# Patient Record
Sex: FEMALE | Race: WHITE
[De-identification: names, ages, dates, MRNs, and addresses within clinical notes are randomized per-mention and may not be internally consistent; named-entity substitution may affect disease eponyms.]

---

## 2017-08-08 ENCOUNTER — HOSPITAL ENCOUNTER (OUTPATIENT)
Dept: HOSPITAL 53 - M RAD | Age: 42
End: 2017-08-08
Attending: NURSE PRACTITIONER
Payer: COMMERCIAL

## 2017-08-08 DIAGNOSIS — E11.9: ICD-10-CM

## 2017-08-08 DIAGNOSIS — Z12.31: Primary | ICD-10-CM

## 2017-08-08 DIAGNOSIS — Z13.220: ICD-10-CM

## 2017-08-08 DIAGNOSIS — K21.9: ICD-10-CM

## 2017-08-08 LAB
ALBUMIN SERPL BCG-MCNC: 3.5 GM/DL (ref 3.2–5.2)
ALBUMIN/GLOB SERPL: 0.76 {RATIO} (ref 1–1.93)
ALP SERPL-CCNC: 109 U/L (ref 45–117)
ALT SERPL W P-5'-P-CCNC: 24 U/L (ref 12–78)
ANION GAP SERPL CALC-SCNC: 7 MEQ/L (ref 8–16)
AST SERPL-CCNC: 15 U/L (ref 15–37)
BASOPHILS # BLD AUTO: 0 K/MM3 (ref 0–0.2)
BASOPHILS NFR BLD AUTO: 0.3 % (ref 0–1)
BILIRUB SERPL-MCNC: 0.3 MG/DL (ref 0.2–1)
BUN SERPL-MCNC: 18 MG/DL (ref 7–18)
CALCIUM SERPL-MCNC: 9.2 MG/DL (ref 8.5–10.1)
CHLORIDE SERPL-SCNC: 105 MEQ/L (ref 98–107)
CHOLEST SERPL-MCNC: 175 MG/DL (ref ?–200)
CO2 SERPL-SCNC: 27 MEQ/L (ref 21–32)
CREAT SERPL-MCNC: 0.78 MG/DL (ref 0.55–1.02)
EOSINOPHIL # BLD AUTO: 0 K/MM3 (ref 0–0.5)
EOSINOPHIL NFR BLD AUTO: 0.5 % (ref 0–3)
ERYTHROCYTE [DISTWIDTH] IN BLOOD BY AUTOMATED COUNT: 13.9 % (ref 11.5–14.5)
EST. AVERAGE GLUCOSE BLD GHB EST-MCNC: 108 MG/DL (ref 60–110)
GFR SERPL CREATININE-BSD FRML MDRD: > 60 ML/MIN/{1.73_M2} (ref 58–?)
GLUCOSE SERPL-MCNC: 94 MG/DL (ref 70–105)
LARGE UNSTAINED CELL #: 0.1 K/MM3 (ref 0–0.4)
LARGE UNSTAINED CELL %: 1.1 % (ref 0–4)
LYMPHOCYTES # BLD AUTO: 2.9 K/MM3 (ref 1.5–4.5)
LYMPHOCYTES NFR BLD AUTO: 26.9 % (ref 24–44)
MCH RBC QN AUTO: 28.5 PG (ref 27–33)
MCHC RBC AUTO-ENTMCNC: 33.9 G/DL (ref 32–36.5)
MCV RBC AUTO: 84 FL (ref 80–96)
MONOCYTES # BLD AUTO: 0.4 K/MM3 (ref 0–0.8)
MONOCYTES NFR BLD AUTO: 3.6 % (ref 0–5)
NEUTROPHILS # BLD AUTO: 7.1 K/MM3 (ref 1.8–7.7)
NEUTROPHILS NFR BLD AUTO: 67.6 % (ref 36–66)
PLATELET # BLD AUTO: 226 K/MM3 (ref 150–450)
POTASSIUM SERPL-SCNC: 4.3 MEQ/L (ref 3.5–5.1)
PROT SERPL-MCNC: 8.1 GM/DL (ref 6.4–8.2)
SODIUM SERPL-SCNC: 139 MEQ/L (ref 136–145)
TRIGL SERPL-MCNC: 68 MG/DL (ref ?–150)
WBC # BLD AUTO: 10.5 K/MM3 (ref 4–10)

## 2017-08-08 PROCEDURE — 83036 HEMOGLOBIN GLYCOSYLATED A1C: CPT

## 2017-08-08 PROCEDURE — 36415 COLL VENOUS BLD VENIPUNCTURE: CPT

## 2017-08-08 PROCEDURE — 80061 LIPID PANEL: CPT

## 2017-08-08 PROCEDURE — 85025 COMPLETE CBC W/AUTO DIFF WBC: CPT

## 2017-08-08 PROCEDURE — 80053 COMPREHEN METABOLIC PANEL: CPT

## 2017-08-08 NOTE — REPMRS
Patient History

The patient states she has not had a clinical breast exam in over

a year. Family history of breast cancer in mother at age 50, 

breast cancer in maternal aunt at age 35, and breast cancer in 

maternal grandmother at age 40.

 

Digital Mammo Screening Bilat: August 8, 2017 - Exam #: 

[id number]

Bilateral CC and MLO view(s) were taken.

 

Technologist: Yasmin Taylor, Technologist

Prior study comparison: November 4, 2015, bilateral digital mammo

screening bilat performed at [facility].

 

FINDINGS: There are scattered fibroglandular densities.  There 

has been no change in the appearance of the mammogram from the 

prior studies.  There is a mild amount of residual fibroglandular

tissue which is fairly symmetric. There is no interval 

development of dominant mass, architectural distortion, or 

clustered microcalcification suggestive of malignancy.

 

ASSESSMENT: BI-RADS/ACR category 1 mammogram. Negative.

 

Recommendation

Routine screening mammogram in 1 year (for women over age 40).

This mammogram was interpreted with the aid of an FDA-approved 

computer-aided dectection system.

 

Electronically Signed By: Car Casrto MD 08/08/17 [id number]

## 2019-06-28 ENCOUNTER — HOSPITAL ENCOUNTER (OUTPATIENT)
Dept: HOSPITAL 53 - M WHC | Age: 44
End: 2019-06-28
Attending: NURSE PRACTITIONER
Payer: COMMERCIAL

## 2019-06-28 ENCOUNTER — HOSPITAL ENCOUNTER (OUTPATIENT)
Dept: HOSPITAL 53 - M LABDRAW1 | Age: 44
End: 2019-06-28
Attending: NURSE PRACTITIONER
Payer: COMMERCIAL

## 2019-06-28 DIAGNOSIS — Z12.31: Primary | ICD-10-CM

## 2019-06-28 DIAGNOSIS — E11.9: ICD-10-CM

## 2019-06-28 DIAGNOSIS — Z13.220: Primary | ICD-10-CM

## 2019-06-28 DIAGNOSIS — K21.9: ICD-10-CM

## 2019-06-28 LAB
BASOPHILS # BLD AUTO: 0 10^3/UL (ref 0–0.2)
BASOPHILS NFR BLD AUTO: 0.3 % (ref 0–1)
CHOLEST SERPL-MCNC: 142 MG/DL (ref ?–200)
CHOLEST/HDLC SERPL: 2.84 {RATIO} (ref ?–5)
EOSINOPHIL # BLD AUTO: 0 10^3/UL (ref 0–0.5)
EOSINOPHIL NFR BLD AUTO: 0.3 % (ref 0–3)
EST. AVERAGE GLUCOSE BLD GHB EST-MCNC: 120 MG/DL (ref 60–110)
HCT VFR BLD AUTO: 38.3 % (ref 36–47)
HDLC SERPL-MCNC: 50 MG/DL (ref 40–?)
HGB BLD-MCNC: 12.5 G/DL (ref 12–15.5)
LDLC SERPL CALC-MCNC: 76 MG/DL (ref ?–100)
LYMPHOCYTES # BLD AUTO: 2.6 10^3/UL (ref 1.5–4.5)
LYMPHOCYTES NFR BLD AUTO: 29.2 % (ref 24–44)
MCH RBC QN AUTO: 27.9 PG (ref 27–33)
MCHC RBC AUTO-ENTMCNC: 32.6 G/DL (ref 32–36.5)
MCV RBC AUTO: 85.5 FL (ref 80–96)
MONOCYTES # BLD AUTO: 0.5 10^3/UL (ref 0–0.8)
MONOCYTES NFR BLD AUTO: 5.8 % (ref 0–5)
NEUTROPHILS # BLD AUTO: 5.7 10^3/UL (ref 1.8–7.7)
NEUTROPHILS NFR BLD AUTO: 64 % (ref 36–66)
NONHDLC SERPL-MCNC: 92 MG/DL
PLATELET # BLD AUTO: 214 10^3/UL (ref 150–450)
RBC # BLD AUTO: 4.48 10^6/UL (ref 4–5.4)
TRIGL SERPL-MCNC: 82 MG/DL (ref ?–150)
WBC # BLD AUTO: 9 10^3/UL (ref 4–10)

## 2019-06-28 NOTE — REP
BILATERAL SCREENING DIGITAL MAMMOGRAM WITH 3D TOMOSYNTHESIS:

 

There are no palpable abnormalities or other breast complaints.

The the patient states she has not had a clinical breast examination in over a

year.

The the patient states she performs self-breast examinations 12 times per year

The Tyrer-Cuzick Score is: 32.6% .

 

Comparison is 11/04/2015.

 

There are scattered areas of fibroglandular density.

There is no dominant mass, micro calcific cluster or architectural distortion

that would indicate malignancy. There are benign macrocalcifications, unchanged.

There are no additional findings on 3D tomosynthesiss.

There is no change from the prior study.

 

Impression:

BIRADS/ACR category 2 mammogram.  Benign findings .

 

Recommendation:

Routine annual screening mammography.

 

This mammogram was interpreted with the aid of a FDA approved computer-aided

detection system.

 

A. Negative mammogram reports should not delay biopsy if a dominant or clinically

suspicious mass is present.

B. Not all breast cancers are identified by mammography or tomosynthesis.

C.  Adenosis and dense breasts may obscure an underlying neoplasm.

 

Patient letter M1.

 

 

Electronically Signed by

Car Richards MD 06/28/2019 11:55 A

## 2019-10-16 ENCOUNTER — HOSPITAL ENCOUNTER (OUTPATIENT)
Dept: HOSPITAL 53 - M SFHCWAGY | Age: 44
End: 2019-10-16
Attending: NURSE PRACTITIONER
Payer: COMMERCIAL

## 2019-10-16 DIAGNOSIS — N76.0: Primary | ICD-10-CM

## 2019-10-17 LAB — HPV LOW VOL RFLX: (no result)

## 2019-12-17 ENCOUNTER — HOSPITAL ENCOUNTER (OUTPATIENT)
Dept: HOSPITAL 53 - M LAB | Age: 44
End: 2019-12-17
Attending: NURSE PRACTITIONER
Payer: COMMERCIAL

## 2019-12-17 DIAGNOSIS — Z91.89: Primary | ICD-10-CM

## 2019-12-17 LAB
BUN SERPL-MCNC: 8 MG/DL (ref 7–18)
CREAT SERPL-MCNC: 0.76 MG/DL (ref 0.55–1.3)
GFR SERPL CREATININE-BSD FRML MDRD: > 60 ML/MIN/{1.73_M2} (ref 58–?)

## 2019-12-19 ENCOUNTER — HOSPITAL ENCOUNTER (OUTPATIENT)
Dept: HOSPITAL 53 - M RAD | Age: 44
End: 2019-12-19
Attending: NURSE PRACTITIONER
Payer: COMMERCIAL

## 2019-12-19 DIAGNOSIS — Z91.89: Primary | ICD-10-CM

## 2019-12-19 NOTE — REP
Bilateral breast MRI study without and with IV gadolinium:

 

History: High risk screening for breast cancer.

 

Comparison mammography June 28, 2019.

 

Technique: 3 Wendy MRI imaging was performed with a dedicated breast coil.

Axial, coronal, and sagittal T1 and T2-weighted scans were obtained with and

without fat saturation in the usual fashion.  The study includes dynamically

acquired post gadolinium enhanced imaging subtraction imaging.  Maximal intensity

projection and multiplanar re-formation imaging is included as well.  The study

was interpreted with the aid of CommutableD, an FDA approved computer-aided detection

(CAD) software program, on a dedicated breast MRI work station.

 

The gadolinium enhancement dose is 19 mL of intravenous ProHance.

 

Findings: There are scattered fibroglandular elements bilaterally.  There is no

evidence of axillary lymphadenopathy or significant cystic change.  High

resolution T1 and T2-weighted pre and postcontrast axial and coronal images and

sagittal images show no suspicious morphologic abnormality in either breast.

Dynamically acquired sequential post contrast images show no suspicious area of

enhancement and/or washout in either breast.  Subtraction images are

unremarkable.

 

Impression:

 

BIRADS category 1 negative findings.  Repeat screening MRI study recommended 1

year.

 

 

Electronically Signed by

Arnol Solorzano MD 12/19/2019 07:59 P

## 2020-02-28 ENCOUNTER — HOSPITAL ENCOUNTER (EMERGENCY)
Dept: HOSPITAL 53 - M ED | Age: 45
Discharge: HOME | End: 2020-02-28
Payer: COMMERCIAL

## 2020-02-28 VITALS — SYSTOLIC BLOOD PRESSURE: 123 MMHG | DIASTOLIC BLOOD PRESSURE: 82 MMHG

## 2020-02-28 VITALS — BODY MASS INDEX: 38.52 KG/M2 | WEIGHT: 217.38 LBS | HEIGHT: 63 IN

## 2020-02-28 DIAGNOSIS — Z88.0: ICD-10-CM

## 2020-02-28 DIAGNOSIS — Z87.891: ICD-10-CM
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
## 2020-02-28 DIAGNOSIS — S60.212A: Primary | ICD-10-CM
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
## 2020-02-28 NOTE — REP
LEFT WRIST, FOUR VIEWS:

 

There is no evidence of an acute fracture, dislocation or intrinsic bone

disease.

 

IMPRESSION:

 

No fracture or dislocation.

 

 

 

 

Electronically Signed by

Car Castro MD 02/28/2020 07:18 P

## 2020-03-19 ENCOUNTER — HOSPITAL ENCOUNTER (OUTPATIENT)
Dept: HOSPITAL 53 - M LAB REF | Age: 45
End: 2020-03-19
Attending: FAMILY MEDICINE
Payer: COMMERCIAL

## 2020-03-19 DIAGNOSIS — E11.9: Primary | ICD-10-CM

## 2020-03-19 LAB
25(OH)D3 SERPL-MCNC: 8.3 NG/ML (ref 30–100)
ALBUMIN SERPL BCG-MCNC: 3.5 GM/DL (ref 3.2–5.2)
ALT SERPL W P-5'-P-CCNC: 16 U/L (ref 12–78)
BASOPHILS # BLD AUTO: 0 10^3/UL (ref 0–0.2)
BASOPHILS NFR BLD AUTO: 0.4 % (ref 0–1)
BILIRUB SERPL-MCNC: 0.3 MG/DL (ref 0.2–1)
BUN SERPL-MCNC: 10 MG/DL (ref 7–18)
CALCIUM SERPL-MCNC: 8.9 MG/DL (ref 8.5–10.1)
CHLORIDE SERPL-SCNC: 108 MEQ/L (ref 98–107)
CHOLEST SERPL-MCNC: 149 MG/DL (ref ?–200)
CHOLEST/HDLC SERPL: 2.66 {RATIO} (ref ?–5)
CO2 SERPL-SCNC: 31 MEQ/L (ref 21–32)
CREAT SERPL-MCNC: 0.61 MG/DL (ref 0.55–1.3)
EOSINOPHIL # BLD AUTO: 0.1 10^3/UL (ref 0–0.5)
EOSINOPHIL NFR BLD AUTO: 1.5 % (ref 0–3)
EST. AVERAGE GLUCOSE BLD GHB EST-MCNC: 123 MG/DL (ref 60–110)
GFR SERPL CREATININE-BSD FRML MDRD: > 60 ML/MIN/{1.73_M2} (ref 58–?)
GLUCOSE SERPL-MCNC: 100 MG/DL (ref 70–100)
HCT VFR BLD AUTO: 39.6 % (ref 36–47)
HDLC SERPL-MCNC: 56 MG/DL (ref 40–?)
HGB BLD-MCNC: 12.9 G/DL (ref 12–15.5)
LDLC SERPL CALC-MCNC: 81 MG/DL (ref ?–100)
LYMPHOCYTES # BLD AUTO: 2 10^3/UL (ref 1.5–5)
LYMPHOCYTES NFR BLD AUTO: 26 % (ref 24–44)
MCH RBC QN AUTO: 28 PG (ref 27–33)
MCHC RBC AUTO-ENTMCNC: 32.6 G/DL (ref 32–36.5)
MCV RBC AUTO: 85.9 FL (ref 80–96)
MONOCYTES # BLD AUTO: 0.4 10^3/UL (ref 0–0.8)
MONOCYTES NFR BLD AUTO: 5.7 % (ref 0–5)
NEUTROPHILS # BLD AUTO: 5 10^3/UL (ref 1.5–8.5)
NEUTROPHILS NFR BLD AUTO: 66.1 % (ref 36–66)
NONHDLC SERPL-MCNC: 93 MG/DL
PLATELET # BLD AUTO: 221 10^3/UL (ref 150–450)
POTASSIUM SERPL-SCNC: 4.4 MEQ/L (ref 3.5–5.1)
PROT SERPL-MCNC: 7.5 GM/DL (ref 6.4–8.2)
RBC # BLD AUTO: 4.61 10^6/UL (ref 4–5.4)
SODIUM SERPL-SCNC: 140 MEQ/L (ref 136–145)
TRIGL SERPL-MCNC: 60 MG/DL (ref ?–150)
TSH SERPL DL<=0.005 MIU/L-ACNC: 2.17 UIU/ML (ref 0.36–3.74)
WBC # BLD AUTO: 7.5 10^3/UL (ref 4–10)

## 2020-05-04 ENCOUNTER — HOSPITAL ENCOUNTER (OUTPATIENT)
Dept: HOSPITAL 53 - M LAB REF | Age: 45
End: 2020-05-04
Attending: NURSE PRACTITIONER
Payer: COMMERCIAL

## 2020-05-04 DIAGNOSIS — J06.9: Primary | ICD-10-CM

## 2020-05-04 LAB
ALBUMIN SERPL BCG-MCNC: 3.6 GM/DL (ref 3.2–5.2)
ALT SERPL W P-5'-P-CCNC: 21 U/L (ref 12–78)
BASOPHILS # BLD AUTO: 0 10^3/UL (ref 0–0.2)
BASOPHILS NFR BLD AUTO: 0.3 % (ref 0–1)
BILIRUB SERPL-MCNC: 0.4 MG/DL (ref 0.2–1)
BUN SERPL-MCNC: 8 MG/DL (ref 7–18)
CALCIUM SERPL-MCNC: 9.1 MG/DL (ref 8.5–10.1)
CHLORIDE SERPL-SCNC: 104 MEQ/L (ref 98–107)
CO2 SERPL-SCNC: 32 MEQ/L (ref 21–32)
CREAT SERPL-MCNC: 0.62 MG/DL (ref 0.55–1.3)
EOSINOPHIL # BLD AUTO: 0.2 10^3/UL (ref 0–0.5)
EOSINOPHIL NFR BLD AUTO: 1.9 % (ref 0–3)
GFR SERPL CREATININE-BSD FRML MDRD: > 60 ML/MIN/{1.73_M2} (ref 58–?)
GLUCOSE SERPL-MCNC: 79 MG/DL (ref 70–100)
HCT VFR BLD AUTO: 39.3 % (ref 36–47)
HGB BLD-MCNC: 12.8 G/DL (ref 12–15.5)
LYMPHOCYTES # BLD AUTO: 2.4 10^3/UL (ref 1.5–5)
LYMPHOCYTES NFR BLD AUTO: 26.3 % (ref 24–44)
MCH RBC QN AUTO: 27.9 PG (ref 27–33)
MCHC RBC AUTO-ENTMCNC: 32.6 G/DL (ref 32–36.5)
MCV RBC AUTO: 85.6 FL (ref 80–96)
MONOCYTES # BLD AUTO: 0.5 10^3/UL (ref 0–0.8)
MONOCYTES NFR BLD AUTO: 5.4 % (ref 0–5)
NEUTROPHILS # BLD AUTO: 6.1 10^3/UL (ref 1.5–8.5)
NEUTROPHILS NFR BLD AUTO: 65.8 % (ref 36–66)
PLATELET # BLD AUTO: 236 10^3/UL (ref 150–450)
POTASSIUM SERPL-SCNC: 4.3 MEQ/L (ref 3.5–5.1)
PROT SERPL-MCNC: 8.1 GM/DL (ref 6.4–8.2)
RBC # BLD AUTO: 4.59 10^6/UL (ref 4–5.4)
SODIUM SERPL-SCNC: 141 MEQ/L (ref 136–145)
WBC # BLD AUTO: 9.3 10^3/UL (ref 4–10)

## 2020-08-06 ENCOUNTER — HOSPITAL ENCOUNTER (OUTPATIENT)
Dept: HOSPITAL 53 - M LAB REF | Age: 45
End: 2020-08-06
Attending: NURSE PRACTITIONER
Payer: COMMERCIAL

## 2020-08-06 DIAGNOSIS — R21: ICD-10-CM

## 2020-08-06 DIAGNOSIS — E55.9: ICD-10-CM

## 2020-08-06 DIAGNOSIS — E11.9: ICD-10-CM

## 2020-08-06 DIAGNOSIS — L20.9: Primary | ICD-10-CM

## 2020-08-06 DIAGNOSIS — E66.09: ICD-10-CM

## 2020-08-06 DIAGNOSIS — J30.2: ICD-10-CM

## 2020-09-03 LAB
BASOPHILS # BLD AUTO: 0 10^3/UL (ref 0–0.2)
BASOPHILS NFR BLD AUTO: 0.3 % (ref 0–1)
EOSINOPHIL # BLD AUTO: 0.1 10^3/UL (ref 0–0.5)
EOSINOPHIL NFR BLD AUTO: 1 % (ref 0–3)
HCT VFR BLD AUTO: 36.3 % (ref 36–47)
HGB BLD-MCNC: 11.7 G/DL (ref 12–15.5)
LYMPHOCYTES # BLD AUTO: 1.9 10^3/UL (ref 1.5–5)
LYMPHOCYTES NFR BLD AUTO: 29.9 % (ref 24–44)
MCH RBC QN AUTO: 27.7 PG (ref 27–33)
MCHC RBC AUTO-ENTMCNC: 32.2 G/DL (ref 32–36.5)
MCV RBC AUTO: 85.8 FL (ref 80–96)
MONOCYTES # BLD AUTO: 0.4 10^3/UL (ref 0–0.8)
MONOCYTES NFR BLD AUTO: 6.8 % (ref 0–5)
NEUTROPHILS # BLD AUTO: 3.8 10^3/UL (ref 1.5–8.5)
NEUTROPHILS NFR BLD AUTO: 61.8 % (ref 36–66)
PLATELET # BLD AUTO: 194 10^3/UL (ref 150–450)
RBC # BLD AUTO: 4.23 10^6/UL (ref 4–5.4)
WBC # BLD AUTO: 6.2 10^3/UL (ref 4–10)

## 2020-09-20 LAB
25(OH)D3 SERPL-MCNC: 41.7 NG/ML (ref 30–100)
ALBUMIN SERPL BCG-MCNC: 3.7 GM/DL (ref 3.2–5.2)
ALT SERPL W P-5'-P-CCNC: 24 U/L (ref 12–78)
BILIRUB SERPL-MCNC: 0.3 MG/DL (ref 0.2–1)
BUN SERPL-MCNC: 11 MG/DL (ref 7–18)
CALCIUM SERPL-MCNC: 8.9 MG/DL (ref 8.5–10.1)
CHLORIDE SERPL-SCNC: 107 MEQ/L (ref 98–107)
CHOLEST SERPL-MCNC: 153 MG/DL (ref ?–200)
CHOLEST/HDLC SERPL: 3.25 {RATIO} (ref ?–5)
CO2 SERPL-SCNC: 30 MEQ/L (ref 21–32)
CREAT SERPL-MCNC: 0.68 MG/DL (ref 0.55–1.3)
EST. AVERAGE GLUCOSE BLD GHB EST-MCNC: 111 MG/DL (ref 60–110)
GFR SERPL CREATININE-BSD FRML MDRD: > 60 ML/MIN/{1.73_M2} (ref 58–?)
GLUCOSE SERPL-MCNC: 93 MG/DL (ref 70–100)
HDLC SERPL-MCNC: 47 MG/DL (ref 40–?)
LDLC SERPL CALC-MCNC: 88 MG/DL (ref ?–100)
NONHDLC SERPL-MCNC: 106 MG/DL
POTASSIUM SERPL-SCNC: 4.1 MEQ/L (ref 3.5–5.1)
PROT SERPL-MCNC: 7.9 GM/DL (ref 6.4–8.2)
SODIUM SERPL-SCNC: 140 MEQ/L (ref 136–145)
T4 FREE SERPL-MCNC: 0.98 NG/DL (ref 0.76–1.46)
TRIGL SERPL-MCNC: 92 MG/DL (ref ?–150)
TSH SERPL DL<=0.005 MIU/L-ACNC: 1.87 UIU/ML (ref 0.36–3.74)

## 2020-10-11 ENCOUNTER — HOSPITAL ENCOUNTER (EMERGENCY)
Dept: HOSPITAL 53 - M ED | Age: 45
Discharge: HOME | End: 2020-10-11
Payer: COMMERCIAL

## 2020-10-11 VITALS — BODY MASS INDEX: 39.06 KG/M2 | HEIGHT: 63 IN | WEIGHT: 220.46 LBS

## 2020-10-11 VITALS — DIASTOLIC BLOOD PRESSURE: 59 MMHG | SYSTOLIC BLOOD PRESSURE: 125 MMHG

## 2020-10-11 DIAGNOSIS — R06.02: ICD-10-CM

## 2020-10-11 DIAGNOSIS — Z87.891: ICD-10-CM

## 2020-10-11 DIAGNOSIS — Z88.0: ICD-10-CM

## 2020-10-11 DIAGNOSIS — R07.9: Primary | ICD-10-CM

## 2020-10-11 DIAGNOSIS — Z91.040: ICD-10-CM

## 2020-10-11 DIAGNOSIS — R00.0: ICD-10-CM

## 2020-10-11 DIAGNOSIS — E11.9: ICD-10-CM

## 2020-10-11 DIAGNOSIS — Z82.49: ICD-10-CM

## 2020-10-11 LAB
BASOPHILS # BLD AUTO: 0 10^3/UL (ref 0–0.2)
BASOPHILS NFR BLD AUTO: 0.3 % (ref 0–1)
EOSINOPHIL # BLD AUTO: 0.1 10^3/UL (ref 0–0.5)
EOSINOPHIL NFR BLD AUTO: 0.9 % (ref 0–3)
HCT VFR BLD AUTO: 36 % (ref 36–47)
HGB BLD-MCNC: 11.4 G/DL (ref 12–15.5)
LYMPHOCYTES # BLD AUTO: 2.7 10^3/UL (ref 1.5–5)
LYMPHOCYTES NFR BLD AUTO: 25.9 % (ref 24–44)
MCH RBC QN AUTO: 26 PG (ref 27–33)
MCHC RBC AUTO-ENTMCNC: 31.7 G/DL (ref 32–36.5)
MCV RBC AUTO: 82 FL (ref 80–96)
MONOCYTES # BLD AUTO: 0.6 10^3/UL (ref 0–0.8)
MONOCYTES NFR BLD AUTO: 5.8 % (ref 0–5)
NEUTROPHILS # BLD AUTO: 6.9 10^3/UL (ref 1.5–8.5)
NEUTROPHILS NFR BLD AUTO: 66.7 % (ref 36–66)
PLATELET # BLD AUTO: 241 10^3/UL (ref 150–450)
RBC # BLD AUTO: 4.39 10^6/UL (ref 4–5.4)
WBC # BLD AUTO: 10.4 10^3/UL (ref 4–10)

## 2020-10-11 NOTE — REPVR
PROCEDURE INFORMATION: 

Exam: XR Chest, 1 View 

Exam date and time: 10/11/2020 7:46 PM 

Age: 45 years old 

Clinical indication: Chest pain; Type not specified 



TECHNIQUE: 

Imaging protocol: XR of the chest 

Views: 1 view. 



COMPARISON: 

CR Chest, 2 view PA, Lat 3/23/2015 10:47 AM 



FINDINGS: 

Lungs: There is decreased inflation of the lungs. No focal infiltrates.

Pleural space: Unremarkable. No pleural effusion. No pneumothorax. 

Heart/Mediastinum: Unremarkable. No cardiomegaly. 

Bones/joints: Unremarkable. 

Soft tissues: There are moderately generous overlying soft tissues. 



IMPRESSION: 

Negative poor inspiratory chest with little change from 03/23/2015. 



Electronically signed by: Atul Alonzo On 10/11/2020  20:23:02 PM

## 2020-10-12 NOTE — ECGEPIP
[facility] - ED

                                       

                                       Test Date:    2020-10-11

Pat Name:     LAYTON LOWERY           Department:   

Patient ID:   [id number]                 Room:         -

Gender:       Female                   Technician:   NITZA

:          1975               Requested By: TOSHIA RIZZO

Order Number: [id number]     Reading MD:   Ria Seaman

                                 Measurements

Intervals                              Axis          

Rate:         103                      P:            52

MT:           192                      QRS:          -22

QRSD:         82                       T:            17

QT:           339                                    

QTc:          445                                    

                           Interpretive Statements

SINUS TACHYCARDIA

VOLTAGE CRITERIA FOR LVH

POSSIBLE ANTERIOR MYOCARDIAL INFARCTION, OF INDETERMINATE AGE

INCREASED RATE 3/23/15

Electronically Signed on 10- 7:03:47 EDT by Ria Seaman

## 2020-10-30 ENCOUNTER — HOSPITAL ENCOUNTER (OUTPATIENT)
Dept: HOSPITAL 53 - M SLEEP HO | Age: 45
End: 2020-10-30
Attending: NURSE PRACTITIONER
Payer: COMMERCIAL

## 2020-10-30 DIAGNOSIS — R06.83: Primary | ICD-10-CM

## 2020-11-10 ENCOUNTER — HOSPITAL ENCOUNTER (OUTPATIENT)
Dept: HOSPITAL 53 - M LAB REF | Age: 45
End: 2020-11-10
Attending: NURSE PRACTITIONER
Payer: COMMERCIAL

## 2020-11-10 DIAGNOSIS — E66.09: ICD-10-CM

## 2020-11-10 DIAGNOSIS — E55.9: ICD-10-CM

## 2020-11-10 DIAGNOSIS — E11.9: ICD-10-CM

## 2020-11-10 DIAGNOSIS — F41.8: Primary | ICD-10-CM

## 2020-11-10 LAB
25(OH)D3 SERPL-MCNC: 24.7 NG/ML (ref 30–100)
ALBUMIN SERPL BCG-MCNC: 3.6 GM/DL (ref 3.2–5.2)
ALT SERPL W P-5'-P-CCNC: 16 U/L (ref 12–78)
BASOPHILS # BLD AUTO: 0 10^3/UL (ref 0–0.2)
BASOPHILS NFR BLD AUTO: 0.3 % (ref 0–1)
BILIRUB SERPL-MCNC: 0.3 MG/DL (ref 0.2–1)
BUN SERPL-MCNC: 12 MG/DL (ref 7–18)
CALCIUM SERPL-MCNC: 9 MG/DL (ref 8.5–10.1)
CHLORIDE SERPL-SCNC: 107 MEQ/L (ref 98–107)
CHOLEST SERPL-MCNC: 144 MG/DL (ref ?–200)
CHOLEST/HDLC SERPL: 2.77 {RATIO} (ref ?–5)
CO2 SERPL-SCNC: 28 MEQ/L (ref 21–32)
CREAT SERPL-MCNC: 0.73 MG/DL (ref 0.55–1.3)
EOSINOPHIL # BLD AUTO: 0.1 10^3/UL (ref 0–0.5)
EOSINOPHIL NFR BLD AUTO: 0.7 % (ref 0–3)
EST. AVERAGE GLUCOSE BLD GHB EST-MCNC: 114 MG/DL (ref 60–110)
GFR SERPL CREATININE-BSD FRML MDRD: > 60 ML/MIN/{1.73_M2} (ref 58–?)
GLUCOSE SERPL-MCNC: 102 MG/DL (ref 70–100)
HCT VFR BLD AUTO: 37.9 % (ref 36–47)
HDLC SERPL-MCNC: 52 MG/DL (ref 40–?)
HGB BLD-MCNC: 11.6 G/DL (ref 12–15.5)
LDLC SERPL CALC-MCNC: 80 MG/DL (ref ?–100)
LYMPHOCYTES # BLD AUTO: 2.1 10^3/UL (ref 1.5–5)
LYMPHOCYTES NFR BLD AUTO: 28.6 % (ref 24–44)
MCH RBC QN AUTO: 25.4 PG (ref 27–33)
MCHC RBC AUTO-ENTMCNC: 30.6 G/DL (ref 32–36.5)
MCV RBC AUTO: 82.9 FL (ref 80–96)
MONOCYTES # BLD AUTO: 0.4 10^3/UL (ref 0–0.8)
MONOCYTES NFR BLD AUTO: 6 % (ref 0–5)
NEUTROPHILS # BLD AUTO: 4.7 10^3/UL (ref 1.5–8.5)
NEUTROPHILS NFR BLD AUTO: 64.1 % (ref 36–66)
NONHDLC SERPL-MCNC: 92 MG/DL
PLATELET # BLD AUTO: 223 10^3/UL (ref 150–450)
POTASSIUM SERPL-SCNC: 4.5 MEQ/L (ref 3.5–5.1)
PROT SERPL-MCNC: 7.8 GM/DL (ref 6.4–8.2)
RBC # BLD AUTO: 4.57 10^6/UL (ref 4–5.4)
SODIUM SERPL-SCNC: 141 MEQ/L (ref 136–145)
TRIGL SERPL-MCNC: 59 MG/DL (ref ?–150)
WBC # BLD AUTO: 7.3 10^3/UL (ref 4–10)

## 2021-02-01 ENCOUNTER — HOSPITAL ENCOUNTER (EMERGENCY)
Dept: HOSPITAL 53 - M ED | Age: 46
Discharge: HOME | End: 2021-02-01
Payer: COMMERCIAL

## 2021-02-01 VITALS
DIASTOLIC BLOOD PRESSURE: 87 MMHG | WEIGHT: 228.4 LBS | HEIGHT: 63 IN | BODY MASS INDEX: 40.47 KG/M2 | SYSTOLIC BLOOD PRESSURE: 133 MMHG

## 2021-02-01 DIAGNOSIS — I10: ICD-10-CM
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
## 2021-02-01 DIAGNOSIS — S43.51XA: Primary | ICD-10-CM

## 2021-02-01 DIAGNOSIS — E11.9: ICD-10-CM

## 2021-02-01 DIAGNOSIS — F32.9: ICD-10-CM

## 2021-02-01 DIAGNOSIS — Z88.0: ICD-10-CM

## 2021-02-01 DIAGNOSIS — Z79.899: ICD-10-CM

## 2021-02-01 DIAGNOSIS — Z91.040: ICD-10-CM
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
## 2021-02-01 DIAGNOSIS — F41.9: ICD-10-CM
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
## 2021-02-01 NOTE — CCD
Continuity of Care Document (CCD)

                             Created on: 2020



Shalini Danna NHUNG

External Reference #: [id number]

: 1975

Sex: Female



Demographics





                          Address                   [address] [place]  [address]

 

                          Home Phone                [phone]

 

                          Preferred Language        Unknown

 

                          Marital Status            Unknown

 

                          [employer]ar[employer] Affiliation     Unknown

 

                          Race                      White

 

                          Ethnic Group              Declined to Specify/Unknown





Author





                          Author                    Danna VILLAR

 

                          Organization              Unknown

 

                          Address                   [address] [place] [address], [facility]

[place]  [address]



 

                          Phone                     [phone]







Care Team Providers





                    Care Team Member Name Role                Phone

 

                    Maryana Jarrett [facility] AUTM                [phone]







Problems





                    Active Problems     Provider            Date

 

                    Chest pain          Earnest Reyes MD Onset: 10/28/2020

 

                    Obesity             Earnest Reyes MD Onset: 10/28/2020

 

                    Type 2 diabetes mellitus Earnest Reyes MD Onset: 10/28/2

020

 

                    Dietary management surveillance Earnest Reyes MD Onset: 

10/28/2020

 

                    Electrocardiogram abnormal Earnest Reyes MD Onset: 10/28

/2020

 

                          Elevated blood-pressure reading without diagnosis of h

ypertension Earnest Reyes MD                             Onset: 10/28/2020

 

                    Snoring             Earnest Reyes MD Onset: 10/28/2020







Social History





                Type            Date            Description     Comments

 

                Birth Sex                       Unknown          

 

                ETOH Use                        Occasionally consumes alcohol  

 

                Tobacco Use     Start: Unknown End: Unknown Patient is a former 

smoker started at 

age 17, at most 1/2 ppd, quit at age 33 

 

                Smoking Status  Reviewed: 10/28/20 Patient is a former smoker st

arted at age 17, 

at most 1/2 ppd, quit at age 33 

 

                    Exercise Type/Frequency                     Fully active: Ab

le to carry on all performance without

restriction                             on feet all day at work 

 

                Exercise Limitations                 None             







Allergies, Adverse Reactions, Alerts





             Active Allergies Reaction     Severity     Comments     Date

 

             Penicillins                            anaphylactic shock 

9

 

             Latex                                               2015







Medications





           Active Medications SIG        Qnty       Indications Ordering Provide

r Date

 

             Loratadine                     10mg Tablets                   1 susy

ly                                

Maryana Jarrett FNP                 10/27/2020

 

                          Vitamin D                     25mcg (1000 Ut) Tablets 

                  1 by 

mouth every day                                 Unknown         10/27/2020







Immunizations





                                        Description

 

                                        No Information Available







Vital Signs





                Date            Vital           Result          Comment

 

                10/28/2020  9:06am Weight          224.00 lb        

 

                    Height              63 inches           5'3"

 

                    BMI (Body Mass Index) 39.7 kg/m2           

 

                    Heart Rate          84 /min              

 

                    BP Systolic Sitting 123 mmHg            Omron, large cuff/Ra

 

                    BP Diastolic Sitting 98 mmHg             Omron, large cuff/R

a







Results





        Test    Acquired Date Facility Test    Result  H/L     Range   Note

 

                    CBC without Differential 10/11/2020          [facility]

e

           (315)-   - White Blood Count 10.4                  4.3-10.9    

 

             Red Blood Count 4.39         Low          4.70-6.20     

 

             Platelets    241                       130-400       

 

             Hemoglobin   11.4         Low          13.0-17.0     

 

             Hematocrit   36.0         Low          39.0-50.0     

 

                    BMP                 10/11/2020          [facility]

           (315)-   - Calcium Ser/Plasma Mass/Vol 5.0                           

    

 

             Sodium       140                                     

 

             Carbon Dioxide Ser/Plasm 27.0                                    

 

             Chloride Serum/Plasma 99                                      

 

             Potassium    4.0                                     

 

             Glucose      185          High                 

 

             Blood Urea Nitrogen 9                         5-21          

 

             Creatinine   0.7                       0.6-1.5       

 

             G F R        --                                      







Procedures





                Date            Code            Description     Status

 

                2020      76950           Echocardiogram 2-D Doppler Color

 Completed

 

                10/28/2020      14069           ECG 12-Lead     Completed







Medical Devices





                                        Description

 

                                        No Information Available







Encounters





           Type       Date       Location   Provider   Dx         Diagnosis

 

           Office Visit 10/28/2020  8:45a Main Office Earnest Reyes MD R07.9

      Chest 

pain, unspecified

 

                          R94.31                    Abnormal electrocardiogram [

ECG] [EKG]

 

                          R03.0                     Elevated blood-pressure read

ing, w/o diagnosis of htn

 

                          E66.09                    Other obesity due to excess 

calories

 

                          R06.83                    Snoring

 

                          Z71.3                     Dietary counseling and surve

illance







Assessments





                Date            Code            Description     Provider

 

                2020      R94.31          Abnormal electrocardiogram [ECG]

 [EKG] ECHO

 

                10/28/2020      R07.9           Chest pain, unspecified Earnest Reyes MD

 

                10/28/2020      R94.31          Abnormal electrocardiogram [ECG]

 [EKG] Earnest Reyes MD

 

                    10/28/2020          R03.0               Elevated blood-press

ure reading, without diagnosis of 

hypertension                            Earnest Reyes MD

 

                10/28/2020      E66.09          Other obesity due to excess terrell

rio Earnest Reyes MD

 

                10/28/2020      R06.83          Snoring         Earnest Reyes MD

 

                10/28/2020      Z71.3           Dietary counseling and surveilla

[facility] Earnest Reyes MD







Plan of Treatment

Future Appointment(s):* 2020  2:00 pm - Holter/Event/Telemetry at Main 
  Office

* 2020  8:00 am - Stress Nuclear/Reg Treadmill at Main Office

10/28/2020 - Earnest Reyes MD* R07.9 Chest pain, unspecified* New Orders:* 
  Treadmill, Scheduled: 20



* Recommendations:* Further evaluation with a Roldan protocol exercise stress 
  test.





* R94.31 Abnormal electrocardiogram [ECG] [EKG]* Recommendations:* 
  Echocardiogram-Doppler





* R03.0 Elevated blood-pressure reading, without diagnosis of hypertension* New 
  Orders:* Ambulatory Blood Pressure Monitoring, Scheduled: 20



* Recommendations:* Ambulatory Blood Pressure Monitor was ordered.





* E66.09 Other obesity due to excess calories* Recommendations:* LOW FAT, WHOLE-
  FOOD, PLANT-BASED DIET - Include fruits, vegetables, intact whole grains, 
  beans & legumes; especially green leafy vegetables, cruciferous vegetables 
  (e.g., broccoli, kale, cauliflower, brussel sprouts, cabbage), turmeric, 
  ground flaxseeds - Avoid animal products (meat, poultry, fish, dairy products,
   eggs) - Avoid processed foods (added sugar, high fructose corn syrup, oil, 
  salt) - Low Fat (8-12% of total calories) - No liquid oils, margarine, 
  shortening, butter, lard, mayonnaise.  - No trans-fats (partially hydrogenated
   oils). - No alcohol - No artificial sweeteners - B12 supplement if completely
   vegan and not on adequate amounts of B12 fortified foods - Non-GMO preferred 
  - Avoid artificial food colors - No fish oil LOW SODIUM   - Target total daily
   sodium 7026-5903 mg /d [do not go below 2300 mg/d]   - No added salt.    - 
  Rule of thumb : Strive for sodium/calorie ratio less than or equal to 1.0 
  EATING HABITS - Stop eating when you reach satiety. - "We wear the fat we 
  eat." - Avoid liquid calories (eat your calories, don't drink your calories). 
  - Avoid restaurants EXERCISE ADVICE (General exercise advice; depends on 
  health conditions) Cardio: Walking or equivalent aerobic activity for 40-60 
  minutes at least 5 times a week and preferably everyday.  Resistance training 
  20-30 minutes, at least 2-3 times per week





* R06.83 Snoring* New Orders:* Home Sleep Study, Scheduled: 10/30/20



* Recommendations:* Home sleep study was ordered.





* Z71.3 Dietary counseling and surveillance

* All * Follow up:* Further follow-up, if any, depending upon results of cardiac
  testing.









Functional Status





                Functional Condition Comment         Date            Status

 

                Independent with all ADL's                                 Activ

e







Mental Status





                                        Description

 

                                        No Information Available







Referrals





                Refer to      Reason for Referral Status          Appt Date

 

                Earnest Reyes MD echo auth emr-expires 21. ca  Created  

       

 

                                        [address] [facility], [facility]

 

                                        [place] [id number] [phone]

## 2021-02-01 NOTE — CCD
Continuity of Care Document (CCD)

                             Created on: 2020



Danna Loya

External Reference #: [id number]

: 1975

Sex: Female



Demographics





                          Address                   [address] [place] [address]

 

                          Home Phone                [phone]

 

                          Preferred Language        Unknown

 

                          Marital Status            Unknown

 

                          Orthodox Affiliation     Unknown

 

                          Race                      White

 

                          Ethnic Group              Declined to Specify/Unknown





Author





                          Author                    Danna TAMEZ MD

 

                          Organization              Unknown

 

                          Address                   [address] [place] [address] [address]

[place]  [address]



 

                          Phone                     [phone]







Care Team Providers





                    Care Team Member Name Role                Phone

 

                    Maryana Jarrett MARIA AUTM                [phone]

 

                    Ali, Mohsin MD      AUTM                [phone]







Problems





                    Active Problems     Provider            Date

 

                    Chest pain          Earnest Tamez MD Onset: 10/28/2020

 

                    Obesity             Earnest Tamez MD Onset: 10/28/2020

 

                    Type 2 diabetes mellitus Earnest Tamez MD Onset: 10/28/2

020

 

                    Dietary management surveillance Earnest Tamez MD Onset: 

10/28/2020

 

                    Electrocardiogram abnormal Earnest Tamez MD Onset: 10/28

/2020

 

                          Elevated blood-pressure reading without diagnosis of h

ypertension Earnest Tamez MD                             Onset: 10/28/2020

 

                    Snoring             Earnest Tamez MD Onset: 10/28/2020

 

                    Abnormal results of cardiovascular function studies Earnest Tamez MD Onset: 

2020

 

                    Palpitations        Earnest Tamez MD Onset: 2020

 

                    Morbid obesity      Earnest Tamez MD Onset: 2020

 

                    Essential hypertension Earnest Tamez MD Onset: 

0







Social History





                Type            Date            Description     Comments

 

                Birth Sex                       Unknown          

 

                ETOH Use                        Occasionally consumes alcohol  

 

                Tobacco Use     Start: Unknown End: Unknown Patient is a former 

smoker started at 

age 17, at most 1/2 ppd, quit at age 33 

 

                Smoking Status  Reviewed: 20 Patient is a former smoker st

arted at age 17, 

at most 1/2 ppd, quit at age 33 

 

                    Exercise Type/Frequency                     Fully active: Ab

le to carry on all performance without

restriction                             on feet all day at work 

 

                Exercise Limitations                 None             







Allergies, Adverse Reactions, Alerts





             Active Allergies Reaction     Severity     Comments     Date

 

             Penicillins                            anaphylactic shock 

9

 

             Latex                                               2015







Medications





           Active Medications SIG        Qnty       Indications Ordering Provide

r Date

 

                          Losartan Potassium                     25mg Tablets   

                1/2 tab by

mouth every morning 30tabs          I10             Earnest Tamez MD 

020

 

                          Doxycycline Hyclate                     100mg Capsules

                   1 by 

mouth twice a day                                 Unknown         2020

 

             Loratadine                     10mg Tablets                   1 susy

Maryana Wyatt FNP                 10/27/2020

 

                          Vitamin D                     25mcg (1000 Ut) Tablets 

                  1 by 

mouth every day                                 Unknown         10/27/2020







Immunizations





                                        Description

 

                                        No Information Available







Vital Signs





                Date            Vital           Result          Comment

 

                2020  8:36am Weight          227.00 lb        

 

                    Height              63 inches           5'3"

 

                    BMI (Body Mass Index) 40.2 kg/m2           

 

                    Heart Rate          83 /min              

 

                    BP Systolic Sitting 128 mmHg            Omron, large cuff/Ra

 

                    BP Diastolic Sitting 90 mmHg             Omron, large cuff/R

a

 

                10/28/2020  9:06am Weight          224.00 lb        

 

                    Height              63 inches           5'3"

 

                    BMI (Body Mass Index) 39.7 kg/m2           

 

                    Heart Rate          84 /min              

 

                    BP Systolic Sitting 123 mmHg            Omron, large cuff/Ra

 

                    BP Diastolic Sitting 98 mmHg             Omron, large cuff/R

a







Results





        Test    Acquired Date Facility Test    Result  H/L     Range   Note

 

                    CBC without Differential 10/11/2020          [facility]

e

           (315)-   - White Blood Count 10.4                  4.3-10.9    

 

             Red Blood Count 4.39         Low          4.70-6.20     

 

             Platelets    241                       130-400       

 

             Hemoglobin   11.4         Low          13.0-17.0     

 

             Hematocrit   36.0         Low          39.0-50.0     

 

                    BMP                 10/11/2020          [facility]

           (315)-   - Calcium Ser/Plasma Mass/Vol 5.0                           

    

 

             Sodium       140                                     

 

             Carbon Dioxide Ser/Plasm 27.0                                    

 

             Chloride Serum/Plasma 99                                      

 

             Potassium    4.0                                     

 

             Glucose      185          High                 

 

             Blood Urea Nitrogen 9                         5-21          

 

             Creatinine   0.7                       0.6-1.5       

 

             G F R        --                                      







Procedures





                Date            Code            Description     Status

 

                2020      81182           Treadmill/Pharmacological Monito

ring Completed

 

                    2020          54480               Ambulatory Blood Pre

ssure 

Monitoring;Recording,Scanning,Inter,RPT Completed

 

                2020      29033           Echocardiogram 2-D Doppler Color

 Completed

 

                10/28/2020      86105           ECG 12-Lead     Completed







[facility]





                                        Description

 

                                        No Information Available







Encounters





           Type       Date       Location   Provider   Dx         Diagnosis

 

           Office Visit 2020  9:00a Main Office Earnest Tamez MD I10  

      Essential 

(primary) hypertension

 

                          R07.9                     Chest pain, unspecified

 

                          E66.01                    Morbid (severe) obesity due 

to excess calories

 

                          R00.2                     Palpitations

 

                          R94.39                    Abnormal result of other car

diovascular function study

 

                          R06.83                    Snoring

 

           Office Visit 10/28/2020  8:45a Main Office Earnest Tamez MD R07.9

      Chest 

pain, unspecified

 

                          R94.31                    Abnormal electrocardiogram [

ECG] [EKG]

 

                          R03.0                     Elevated blood-pressure read

ing, w/o diagnosis of htn

 

                          E66.09                    Other obesity due to excess 

calories

 

                          R06.83                    Snoring

 

                          Z71.3                     Dietary counseling and surve

illance







Assessments





                Date            Code            Description     Provider

 

                2020      I10             Essential (primary) hypertension

 Earnest Tamez MD

 

                2020      R07.9           Chest pain, unspecified Earnest Tamez MD

 

                2020      E66.01          Morbid (severe) obesity due to e

xcess calories Earnest Tamez MD

 

                2020      R00.2           Palpitations    Earnest Tamez MD

 

                2020      R94.39          Abnormal result of other cardiov

ascular function study Earnest Tamez MD

 

                2020      R06.83          Snoring         Earnest Tamez MD

 

                2020      I10             Essential (primary) hypertension

 Earnest Tamez MD

 

                2020      R07.9           Chest pain, unspecified Stress N

uclear/Reg Treadmill

 

                    2020          R03.0               Elevated blood-press

ure reading, without diagnosis of 

hypertension                            Holter/Event/Telemetry

 

                2020      R94.31          Abnormal electrocardiogram [ECG]

 [EKG] ECHO

 

                10/28/2020      R07.9           Chest pain, unspecified Earnest Tamez MD

 

                10/28/2020      R94.31          Abnormal electrocardiogram [ECG]

 [EKG] Earnest Tamez MD

 

                    10/28/2020          R03.0               Elevated blood-press

ure reading, without diagnosis of 

hypertension                            Earnest Tamez MD

 

                10/28/2020      E66.09          Other obesity due to excess terrell

rio Earnest Tamez MD

 

                10/28/2020      R06.83          Snoring         Earnest Tamez MD

 

                10/28/2020      Z71.3           Dietary counseling and surveilla

nce Earnest Tamez MD







Plan of Treatment

Future Appointment(s):* 2021  8:00 am - Earnest Tamez MD at Main 
  Office

* 2021  3:30 pm - Holter/Event/Telemetry at Main Office

* 2021  8:30 am - Stress Nuclear/Reg Treadmill at Main Office

2020 - Earnest Tamez MD* I10 Essential (primary) hypertension* New 
  Medication:* Losartan Potassium 25 mg - 1/2 tab by mouth every morning





* R07.9 Chest pain, unspecified* New Xrays:* NM Heart Myocardial Perfusion Spect
   Multiple Studies, Scheduled: 21





* E66.01 Morbid (severe) obesity due to excess calories

* R00.2 Palpitations* New Orders:* Holter Monitor, Scheduled: 21





* R94.39 Abnormal result of other cardiovascular function study

* R06.83 Snoring* Referral:* Ali, Mohsin, MD, Internal Medicine



* Recommendations:* Patient was referred to sleep specialist Dr. Mohsin Ali for 
  further evaluation and management.





* All * Follow up:* Clinic visit in 6 weeks.









Functional Status





                Functional Condition Comment         Date            Status

 

                Independent with all ADL's                                 Activ

e







Mental Status





                                        Description

 

                                        No Information Available







Referrals





                Refer to      Reason for Referral Status          Appt Date

 

                          Ali, Mohsin, MD           Please evaluate & manage sno

ring, somnolence, abnormal home 

sleep study.  Thanks!!    Created                   

 

                                        [facility]

 

                                        [address] [facility] [address]

 

                                        [phone]

 

                                          

 

                Earnest Tamez MD echo auth emr-expires 21. ca  Created  

       

 

                                        [address] [facility], [address]

 

                                        [facility] [address]

 

                                        [phone]

## 2021-02-01 NOTE — CCD
Ambulatory Summary

                             Created on: 2020



Danna Loya

External Reference #: [id number]

: 1975

Sex: Female



Demographics





                          Address                   [address] [facility] [address] [place] [address]  [address]

 

                          Home Phone                [phone]

 

                          Preferred Language        English

 

                          Marital Status            Unknown

 

                          Christian Affiliation     Unknown

 

                          Race                      White

 

                          Ethnic Group              Not  or 





Author





                          Organization              Unknown

 

                          Address                   [address] [place] [address]  [address]



 

                          Phone                     [phone]







Care Team Providers





                    Care Team Member Name Role                Phone

 

                    Maryana Jarrett  Unavailable         Unavailable



                                                      



Allergies

                      



                Code                   Code System                   Name       

            

Reaction                   Severity                   Status                   

Onset                

 

                                                                                

Penicillin          

                                                                               

              

                                                                               

    Active             

                                                            2020          

                        



            



                                        Notes: Some allergies listed in Document

: #[id number] could not be added to this 

patient's chart. Please review this document and add these allergies to the 
patient's chart manually as needed.                



                                                                                
       



Medications

                      



                Name                   Status                   Start Date      

             

Stop Date                                                            

 

                                        azithromycin 250 mg tablet

 TAKE 2TABS BY MOUTH ON DAY 1 THEN 1 TABLET DAILY FOR 4 DAYS Active             

                    Not 

available

 

                                        betamethasone dipropionate 0.05 % topica

l ointment

 APPLY TO AFFECTED AREA S  ON ARMS AND LEGS TWICE DAILY AS NEEDED Active        

                         Not 

available

 

                                        cholecalciferol (vitamin D3) 1,250 mcg (

50,000 unit) capsule

 TAKE 1 CAP BY MOUTH ONCE WEEKLY FOR 12 WEEKS THEN CHANGE TO 1000 UNIT TAB  
DAILY THEREAFTER    Active                                 Not available

 

                                        cholecalciferol (vitamin D3) 25 mcg (1,0

00 unit) tablet

 TAKE ONE TABLET BY MOUTH EVERY DAY  STARTING AFTER 50 000 UNIT CAPSULES ARE 
COMPLETED           Active                                 Not available

 

                fluticasone propionate 50 mcg/actuation nasal spray,suspension A

ctive                         Not 

available

 

                loratadine 10 mg tablet Active                         Not avai

lable

 

                                        prednisone 10 mg tablet

 TAKE 2 TABLETS BY MOUTH TWICE DAILY FOR 5 DAYS THEN 1 TABLET TWICE DAILY FOR 5 
DAYS THEN 1 TABLET DAILY Active                                 Not available

 

                triamcinolone acetonide 0.1 % topical ointment Active          

               Not available

 

                vitamin d3  25 mcg (1000 ut) tabs Active                       

  Not available



                                                                                
                                                                                
      



Problems

                      



                Name                   Status                   Onset Date      

             

Source                                                  

 

                Simple Obesity  Active          2020      History

 

                Body Mass Index 30+ - Obesity Active          2020      Hi

story

 

                Clinical Finding Active          2020      History

 

                Clinical Finding Active          2020      History

 

                Seasonal Allergic Rhinitis Active          2020      Histo

ry

 

                Disorder of Upper Respiratory System Active          2020 

     History

 

                Eruption        Active          2020      History

 

                Vitamin D Deficiency Active          2020      History

 

                Atopic Dermatitis Active          2020      History

 

                Procedure by Method Active          2020      History

 

                Patient Asked to Attend Active          2020      History

 

                Emotional State Finding Active          2020      History



                                                                                
                                                                                
                                    



Procedures

          







                                        Notes: Rt ankle reconstruction , Cholecy

sectomy 2009, Tubal ligation 

2014



                                                                              



Results

                          



                                        Lab Results

 

                                         



                



      Date  Name  Specimen Result Interpretation Description Value Range Status 

Address 



 

        11/10/2020 CBC W/ Auto Diff         Normal           White Blood Count  

7.3 10  4.0-10.0 10 

[facility]: [address] [place] [address]

 

                          Normal         Red Blood Count  4.57 10 4.00-5.40 10

 [facility]: [address] [place] [address]

 

                          Low            Hemoglobin  11.6 g/dL 12.0-15.5 g/dL 

[facility]: [address] [place] [address]

 

                          Normal         Hematocrit  37.9 % 36.0-47.0 % [facility]: 

[address] [place] [address]

 

                          Normal         Mean Corpuscular Volume  82.9 fL 80.0

-96.0 fL [facility]: [address] [place] [address]

 

                          Low            Mean Corpuscular Hemoglobin  25.4 pg 

27.0-33.0 pg [facility]: [address] [place] [address]

 

                          Low            Mean Corpuscular HGB Conc  30.6 g/dL 

32.0-36.5 g/dL [facility]: [address] [place] [address]

 

                          Normal         Red Cell Distribution Width  14.4 % 1

1.5-14.5 % [facility]: [address] [place] [address]

 

                          Normal         Platelet Count, Automated  223 10 150

-450 10 [facility]: 0 [facility]

 

                          Normal         Neutrophils %  64.1 % 36.0-66.0 % [facility]: 0 [facility]

 

                          Normal         Lymph %  28.6 % 24.0-44.0 % [facility]: 0 

[facility]

 

                          High           Mono %  6.0 %  0.0-5.0 % [facility]: 0 

[facility]

 

                          Normal         Eos %  0.7 %  0.0-3.0 % [facility]: [address] [place] [address]

 

                          Normal         Baso %  0.3 %  0.0-1.0 % [facility]: [address] [place] [address]

 

                          Normal         Immature Granulocyte %  0.3 %  0-3.0 

% [facility]: [address] [place] [address]

 

                          Normal         Nucleated Red Blood Cell %  0.0 %  0-

0 %  [facility]: [address] [place] [address]

 

                          Normal         Neutrophils #  4.7 10 1.5-8.5 10 [facility]:

[address] [place] [address]

 

                          Normal         Lymph #  2.1 10 1.5-5.0 10 [facility]: [address] [place] [address]

 

                          Normal         Mono #  0.4 10 0.0-0.8 10 [facility]: [address] [place] [address]

 

                          Normal         Eos #  0.1 10 0.0-0.5 10 [facility]: [address] [place] [address]

 

                          Normal         Baso #  0.0 10 0.0-0.2 10 [facility]: [address] [place] [address]

 

           11/10/2020 HbA1C (Hemoglobin a1C), Blood Blood  venous Normal        

         Hemoglobin a1C 

                5.6 %                          [facility]: [address] [place] [address]

 

                      Blood  venous High             Estimated Average Glucose

  114 mg/dL  mg/dL 

[facility]: [address] [place] [address]

 

          11/10/2020 CMP, Serum or Plasma Blood  venous High                 Glu

cose, Fasting  102 

mg/dL                mg/dL        [facility]

nter: [address] [place] [address]

 

                   Blood  venous Normal         Blood Urea Nitrogen  12 mg/dL 

7-18 mg/dL [facility]: [address] [place] [address]

 

                      Blood  venous Normal           Creatinine for GFR  0.73 

mg/dL 0.55-1.30 mg/dL [facility]: [address] [place] [address]

 

                   Blood  venous Normal         Glomerular Filtration Rate  > 

60.0 >58    [facility]: [address] [place] [address]

 

                   Blood  venous Normal         Sodium Level  141 mEq/L 136-14

5 mEq/L [facility]: [address] [place] [address]

 

                   Blood  venous Normal         Potassium Serum  4.5 mEq/L 3.5

-5.1 mEq/L [facility]: [address] [facility]

 

                   Blood  venous Normal         Chloride Level  107 mEq/L 98-1

07 mEq/L [facility]: [address] [facility]

 

                   Blood  venous Normal         Carbon Dioxide Level  28 mEq/L

 21-32 mEq/L [facility]: [address] [place] [address]

 

                   Blood  venous Low            Anion Gap  6 mEq/L 8-16 mEq/L 

[facility]: [address] [facility]

 

                   Blood  venous Normal         Calcium Level  9.0 mg/dL 8.5-1

0.1 mg/dL [facility]: [address] [facility]

 

                   Blood  venous Normal         AST/SGOT  12 U/L 7-37 U/L [facility]

l  [facility]: [address] [place] [address]

 

                   Blood  venous Normal         ALT/SGPT  16 U/L 12-78 U/L [facility]: [address] [facility]

 

                   Blood  venous Normal         Alkaline Phosphatase  88 U/L 4

5-117 U/L [facility]: [address] [facility]

 

                   Blood  venous Normal         Bilirubin,total  0.3 mg/dL 0.2

-1.0 mg/dL [facility]: [address] [place] [address]

 

                   Blood  venous Normal         Total Protein  7.8 gm/dL 6.4-8

.2 gm/dL [facility]: [address] [place] [address]

 

                   Blood  venous Normal         Albumin  3.6 gm/dL 3.2-5.2 gm/

dL [facility]: [address] [place] [address]

 

                   Blood  venous Low            Albumin/globulin Ratio  0.9   

 1.2-2.2 [facility]: [address] [place] [address]

 

          11/10/2020 Lipid Panel, Blood Blood  venous Normal               Trigl

ycerides Level  59 

mg/dL               <150 mg/dL          [facility]

nter: [address] [facility]

 

                   Blood  venous Normal         Cholesterol Level  144 mg/dL <

200 mg/dL [facility]: 0 [facility]

 

                   Blood  venous Normal         HDL Cholesterol  52 mg/dL >40 

mg/dL Final  [facility]: [address] [facility]

 

                   Blood  venous Normal         LDL Cholesterol  80 mg/dL <100

 mg/dL Final  [facility]: [address] [facility]

 

                   Blood  venous Normal         Non-hdl-c  92 mg/dL       Fin

[facility]: [address] [facility]

 

                   Blood  venous Normal         Cholesterol Risk Ratio  2.769 

 <5     Final  [facility]: [address] [facility]

 

          11/10/2020 Vitamin D, 25-Hydroxy, Total, Serum           Low          

        Total 25(Oh) Vitamin D  

24.7 NG/mL          30.0-100.0 NG/mL    Final               [facility]

nter: [address] [facility]



                                                                                
                                     



Past Encounters

                      



                                        2020

Generalized Anxiety Disorder; Posttraumatic Stress Disorder; Moderate Recurrent 
Major Depression

Katarzyna Escalante LMSW: [address] [place] [address], Ph. [phone]

 

                                        11/10/2020

Adult Health Examination

MARIA Alvarez-BC: [address] [place] [address], Ph. [phone]



                                                                                
                 



Social History

          



None recorded.                                                                  
           



Vaccine List

          



None recorded.                                                                  
           



Plan of Care

                      



                Reminders                                                       

    Provider    

           

 

                Appointments    None recorded.                 

 

                Lab             None recorded.                 

 

                Referral        None recorded.                 

 

                Procedures      None recorded.                 

 

                Surgeries       None recorded.                 

 

                Imaging         None recorded.                 



                                                                              



Vitals

          2020





                    Height              Weight              Blood Pressure 

 

                     63 in               227 lbs             97/61 mm[Hg]  



2020





                    Height              Weight              Blood Pressure 

 

                     63 in               222 lbs 2.08 oz     125/84 mm[Hg]  



2020





                    Height              Weight              Blood Pressure 

 

                     63 in               220 lbs 3.04 oz     131/87 mm[Hg]  



2020





                    Height              Weight              Blood Pressure 

 

                     63 in               213 lbs 6.08 oz     113/80 mm[Hg]

## 2021-02-01 NOTE — CCD
Continuity of Care Document (CCD)

                             Created on: 2020



Danna Loya

External Reference #: [id number]

: 1975

Sex: Female



Demographics





                          Address                   [address] [place]  [address]

 

                          Home Phone                [phone]

 

                          Preferred Language        Unknown

 

                          Marital Status            Unknown

 

                          Jew Affiliation     Unknown

 

                          Race                      White

 

                          Ethnic Group              Declined to Specify/Unknown





Author





                          Author                    [facility]

[facility]

 

                          Organization              Unknown

 

                          Address                   [address] [place] [address] [address]

[place]  [address]



 

                          Phone                     [phone]







Care Team Providers





                    Care Team Member Name Role                Phone

 

                    Maryana Jarrett AUTM                [phone]







Problems





                    Active Problems     Provider            Date

 

                    Chest pain          Earnest Reyes MD Onset: 10/28/2020

 

                    Obesity             Earnest Reyes MD Onset: 10/28/2020

 

                    Type 2 diabetes mellitus Earnest Reyes MD Onset: 10/28/2

020

 

                    Dietary management surveillance Earnest Reyes MD Onset: 

10/28/2020

 

                    Electrocardiogram abnormal Earnest Reyes MD Onset: 10/28

/2020

 

                          Elevated blood-pressure reading without diagnosis of h

ypertension Earnest Reyes MD                             Onset: 10/28/2020

 

                    Snoring             Earnest Reyes MD Onset: 10/28/2020







Social History





                Type            Date            Description     Comments

 

                Birth Sex                       Unknown          

 

                ETOH Use                        Occasionally consumes alcohol  

 

                Tobacco Use     Start: Unknown End: Unknown Patient is a former 

smoker started at 

age 17, at most 1/2 ppd, quit at age 33 

 

                Smoking Status  Reviewed: 10/28/20 Patient is a former smoker st

arted at age 17, 

at most 1/2 ppd, quit at age 33 

 

                    Exercise Type/Frequency                     Fully active: Ab

le to carry on all performance without

restriction                             on feet all day at work 

 

                Exercise Limitations                 None             







Allergies, Adverse Reactions, Alerts





             Active Allergies Reaction     Severity     Comments     Date

 

             Penicillins                            anaphylactic shock 

9

 

             Latex                                               2015







Medications





           Active Medications SIG        Qnty       Indications Ordering Provide

r Date

 

             Loratadine                     10mg Tablets                   1 susy

ly                                

Maryana Jarrett FNP                 10/27/2020

 

                          Vitamin D                     25mcg (1000 Ut) Tablets 

                  1 by 

mouth every day                                 Unknown         10/27/2020







Immunizations





                                        Description

 

                                        No Information Available







Vital Signs





                Date            Vital           Result          Comment

 

                10/28/2020  9:06am Weight          224.00 lb        

 

                    Height              63 inches           5'3"

 

                    BMI (Body Mass Index) 39.7 kg/m2           

 

                    Heart Rate          84 /min              

 

                    BP Systolic Sitting 123 mmHg            Omron, large cuff/Ra

 

                    BP Diastolic Sitting 98 mmHg             Omron, large cuff/R

a







Results





        Test    Acquired Date Facility Test    Result  H/L     Range   Note

 

                    CBC without Differential 10/11/2020          [facility]

e

           (315)-   - White Blood Count 10.4                  4.3-10.9    

 

             Red Blood Count 4.39         Low          4.70-6.20     

 

             Platelets    241                       130-400       

 

             Hemoglobin   11.4         Low          13.0-17.0     

 

             Hematocrit   36.0         Low          39.0-50.0     

 

                    BMP                 10/11/2020          [facility]

           (315)-   - Calcium Ser/Plasma Mass/Vol 5.0                           

    

 

             Sodium       140                                     

 

             Carbon Dioxide Ser/Plasm 27.0                                    

 

             Chloride Serum/Plasma 99                                      

 

             Potassium    4.0                                     

 

             Glucose      185          High                 

 

             Blood Urea Nitrogen 9                         5-21          

 

             Creatinine   0.7                       0.6-1.5       

 

             G F R        --                                      







Procedures





                Date            Code            Description     Status

 

                2020      30953           Treadmill/Pharmacological Monito

ring Completed

 

                    2020          51607               Ambulatory Blood Pre

ssure 

Monitoring;Recording,Scanning,Inter,RPT Completed

 

                2020      10897           Echocardiogram 2-D Doppler Color

 Completed

 

                10/28/2020      60451           ECG 12-Lead     Completed







Medical Devices





                                        Description

 

                                        No Information Available







Encounters





           Type       Date       Location   Provider   Dx         Diagnosis

 

           Office Visit 10/28/2020  8:45a Main Office Earnest Reyes MD R07.9

      Chest 

pain, unspecified

 

                          R94.31                    Abnormal electrocardiogram [

ECG] [EKG]

 

                          R03.0                     Elevated blood-pressure read

ing, w/o diagnosis of htn

 

                          E66.09                    Other obesity due to excess 

calories

 

                          R06.83                    Snoring

 

                          Z71.3                     Dietary counseling and surve

illance







Assessments





                Date            Code            Description     Provider

 

                2020      R07.9           Chest pain, unspecified Stress N

uclear/Reg Treadmill

 

                    2020          R03.0               Elevated blood-press

ure reading, without diagnosis of 

hypertension                            Holter/Event/Telemetry

 

                2020      R94.31          Abnormal electrocardiogram [ECG]

 [EKG] ECHO

 

                10/28/2020      R07.9           Chest pain, unspecified Earnest Reyes MD

 

                10/28/2020      R94.31          Abnormal electrocardiogram [ECG]

 [EKG] Earnest Reyes MD

 

                    10/28/2020          R03.0               Elevated blood-press

ure reading, without diagnosis of 

hypertension                            Earnest Reyes MD

 

                10/28/2020      E66.09          Other obesity due to excess terrell

rio Earnest Reyes MD

 

                10/28/2020      R06.83          Snoring         Earnest Reyes MD

 

                10/28/2020      Z71.3           Dietary counseling and surveilla

nce Earnest Reyes MD







Plan of Treatment

10/28/2020 - Earnest Reyes MD* R07.9 Chest pain, unspecified* 
  Recommendations:* Further evaluation with a Roldan protocol exercise stress 
  test.





* R94.31 Abnormal electrocardiogram [ECG] [EKG]* Recommendations:* 
  Echocardiogram-Doppler





* R03.0 Elevated blood-pressure reading, without diagnosis of hypertension* 
  Recommendations:* Ambulatory Blood Pressure Monitor was ordered.





* E66.09 Other obesity due to excess calories* Recommendations:* LOW FAT, WHOLE-
  FOOD, PLANT-BASED DIET - Include fruits, vegetables, intact whole grains, 
  beans & legumes; especially green leafy vegetables, cruciferous vegetables 
  (e.g., broccoli, kale, cauliflower, brussel sprouts, cabbage), turmeric, 
  ground flaxseeds - Avoid animal products (meat, poultry, fish, dairy products,
   eggs) - Avoid processed foods (added sugar, high fructose corn syrup, oil, 
  salt) - Low Fat (8-12% of total calories) - No liquid oils, margarine, 
  shortening, butter, lard, mayonnaise.  - No trans-fats (partially hydrogenated
   oils). - No alcohol - No artificial sweeteners - B12 supplement if completely
   vegan and not on adequate amounts of B12 fortified foods - Non-GMO preferred 
  - Avoid artificial food colors - No fish oil LOW SODIUM   - Target total daily
   sodium 7106-1337 mg /d [do not go below 2300 mg/d]   - No added salt.    - 
  Rule of thumb : Strive for sodium/calorie ratio less than or equal to 1.0 
  EATING HABITS - Stop eating when you reach satiety. - "We wear the fat we 
  eat." - Avoid liquid calories (eat your calories, don't drink your calories). 
  - Avoid restaurants EXERCISE ADVICE (General exercise advice; depends on 
  health conditions) Cardio: Walking or equivalent aerobic activity for 40-60 
  minutes at least 5 times a week and preferably everyday.  Resistance training 
  20-30 minutes, at least 2-3 times per week





* R06.83 Snoring* New Orders:* Home Sleep Study, Scheduled: 10/30/20



* Recommendations:* Home sleep study was ordered.





* Z71.3 Dietary counseling and surveillance

* All * Follow up:* Further follow-up, if any, depending upon results of cardiac
  testing.









Functional Status





                Functional Condition Comment         Date            Status

 

                Independent with all ADL's                                 Activ

e







Mental Status





                                        Description

 

                                        No Information Available







Referrals





                Refer to      Reason for Referral Status          Appt Date

 

                Antecol, Earnest VELIZ MD echo auth emr-expires 21. ca  Created  

       

 

                                        [id number] [facility] [address] [phone]

## 2021-02-01 NOTE — REP
INDICATION:

fall on ice



COMPARISON:

None.



TECHNIQUE:

Internal rotation, external rotation, and Y view.



FINDINGS:

No acute fracture or dislocation.  The acromioclavicular and glenohumeral joints are

intact.  No periarticular calcifications or degenerative changes are appreciated.  Sub

acromial space is normal.  Surrounding soft tissues are unremarkable.



IMPRESSION:

Normal age-appropriate right shoulder radiographs.  No acute fracture or dislocation.





<Electronically signed by José Miguel Gordon > 02/01/21 0919

## 2021-02-01 NOTE — CCD
Ambulatory Summary

                             Created on: 11/10/2020



[facility]

External Reference #: [id number]

: 1975

Sex: Female



Demographics





                          Address                   [address] [address] [place] [address]  [address]

 

                          Home Phone                [phone]

 

                          Preferred Language        English

 

                          Marital Status            Unknown

 

                          Christianity Affiliation     Unknown

 

                          Race                      White

 

                          Ethnic Group              Not  or 





Author





                          Organization              Unknown

 

                          Address                   [address] [place] [address]  [address]



 

                          Phone                     [phone]







Care Team Providers





                    Care Team Member Name Role                Phone

 

                    Maryana Jarrett  Unavailable         Unavailable



                                                      



Allergies

                      



                Code                   Code System                   Name       

            

Reaction                   Severity                   Status                   

Onset                

 

                                                                                

Penicillin          

                                                                               

              

                                                                               

    Active             

                                                            2020          

                        



                                                                                
       



Medications

                      



                Name                   Status                   Start Date      

             

Stop Date                                                            

 

                                        azithromycin 250 mg tablet

 TAKE 2TABS BY MOUTH ON DAY 1 THEN 1 TABLET DAILY FOR 4 DAYS Active             

                    Not 

available

 

                                        betamethasone dipropionate 0.05 % topica

l ointment

 APPLY TO AFFECTED AREA S  ON ARMS AND LEGS TWICE DAILY AS NEEDED Active        

                         Not 

available

 

                                        cholecalciferol (vitamin D3) 1,250 mcg (

50,000 unit) capsule

 TAKE 1 CAP BY MOUTH ONCE WEEKLY FOR 12 WEEKS THEN CHANGE TO 1000 UNIT TAB  
DAILY THEREAFTER    Active                                 Not available

 

                                        cholecalciferol (vitamin D3) 25 mcg (1,0

00 unit) tablet

 TAKE ONE TABLET BY MOUTH EVERY DAY  STARTING AFTER 50 000 UNIT CAPSULES ARE 
COMPLETED           Active                                 Not available

 

                fluticasone propionate 50 mcg/actuation nasal spray,suspension A

ctive                         Not 

available

 

                                        loratadine 10 mg tablet

 TAKE 1 TABLET BY MOUTH DAILY Active                                 Not availa

ble

 

                                        prednisone 10 mg tablet

 TAKE 2 TABLETS BY MOUTH TWICE DAILY FOR 5 DAYS THEN 1 TABLET TWICE DAILY FOR 5 
DAYS THEN 1 TABLET DAILY Active                                 Not available

 

                triamcinolone acetonide 0.1 % topical ointment Active          

               Not available

 

                vitamin d3  25 mcg (1000 ut) tabs Active                       

  Not available



                                                                                
                                                                                
      



Problems

                      



                Name                   Status                   Onset Date      

             

Source                                                  

 

                Simple Obesity  Active          2020      History

 

                Body Mass Index 30+ - Obesity Active          2020      Hi

story

 

                Clinical Finding Active          2020      History

 

                Clinical Finding Active          2020      History

 

                Seasonal Allergic Rhinitis Active          2020      Histo

ry

 

                Disorder of Upper Respiratory System Active          2020 

     History

 

                Eruption        Active          2020      History

 

                Vitamin D Deficiency Active          2020      History

 

                Atopic Dermatitis Active          2020      History

 

                Procedure by Method Active          2020      History

 

                Patient Asked to Attend Active          2020      History

 

                Emotional State Finding Active          2020      History



                                                                                
                                                                                
                                    



Procedures

          







                                        Notes: Rt ankle reconstruction , Cholecy

sectomy 2009, Tubal ligation 

2014



                                                                              



Results

                          



                                        Lab Results

 

                                         



                



None recorded.                                                                  
           



Past Encounters

                      



                                        11/10/2020

Adult Health Examination

Maryana Jarrett, [facility]: [address] [place] [address], Ph. [phone]



                                                                                
       



Social History

          



None recorded.                                                                  
           



Vaccine List

          



None recorded.                                                                  
           



Plan of Care

                      



                Reminders                                                       

    Provider    

           

 

                Appointments    None recorded.                 

 

                Lab             None recorded.                 

 

                Referral        None recorded.                 

 

                Procedures      None recorded.                 

 

                Surgeries       None recorded.                 

 

                Imaging         None recorded.                 



                                                                              



Vitals

          2020





                    Height              Weight              Blood Pressure 

 

                     63 in               227 lbs             97/61 mm[Hg]  



2020





                    Height              Weight              Blood Pressure 

 

                     63 in               222 lbs 2.08 oz     125/84 mm[Hg]  



2020





                    Height              Weight              Blood Pressure 

 

                     63 in               220 lbs 3.04 oz     131/87 mm[Hg]  



2020





                    Height              Weight              Blood Pressure 

 

                     63 in               213 lbs 6.08 oz     113/80 mm[Hg]

## 2021-02-01 NOTE — CCD
Ambulatory Summary

                             Created on: 2020



Danna Loya

External Reference #: [id number]

: 1975

Sex: Female



Demographics





                          Address                   [address] [facility] [address] [place] [address]  [address]

 

                          Home Phone                [phone]

 

                          Preferred Language        English

 

                          Marital Status            Unknown

 

                          Baptism Affiliation     Unknown

 

                          Race                      White

 

                          Ethnic Group              Not  or 





Author





                          Organization              Unknown

 

                          Address                   [address] [place] [address]  [address]



 

                          Phone                     [phone]







Care Team Providers





                    Care Team Member Name Role                Phone

 

                    Maryana Jarrett  Unavailable         Unavailable



                                                      



Allergies

          



          Code      Code System Name      Reaction  Severity  Status    Onset

 

                              Penicillin                   Active    

0







                                        Notes: Some allergies listed in Document

: #[id number] could not be added to this 

patient's chart. Please review this document and add these allergies to the 
patient's chart manually as needed.



                                                                              



Medications

                      



                Name                   Status                   Start Date      

             

Stop Date                                                            

 

                                        azithromycin 250 mg tablet

 TAKE 2TABS BY MOUTH ON DAY 1 THEN 1 TABLET DAILY FOR 4 DAYS Active             

                    Not 

available

 

                                        betamethasone dipropionate 0.05 % topica

l ointment

 APPLY TO AFFECTED AREA S  ON ARMS AND LEGS TWICE DAILY AS NEEDED Active        

                         Not 

available

 

                                        cholecalciferol (vitamin D3) 1,250 mcg (

50,000 unit) capsule

 TAKE 1 CAP BY MOUTH ONCE WEEKLY FOR 12 WEEKS THEN CHANGE TO 1000 UNIT TAB  
DAILY THEREAFTER    Active                                 Not available

 

                                        cholecalciferol (vitamin D3) 25 mcg (1,0

00 unit) tablet

 TAKE ONE TABLET BY MOUTH EVERY DAY  STARTING AFTER 50 000 UNIT CAPSULES ARE 
COMPLETED           Active                                 Not available

 

                fluticasone propionate 50 mcg/actuation nasal spray,suspension A

ctive                         Not 

available

 

                loratadine 10 mg tablet Active                         Not avai

lable

 

                                        prednisone 10 mg tablet

 TAKE 2 TABLETS BY MOUTH TWICE DAILY FOR 5 DAYS THEN 1 TABLET TWICE DAILY FOR 5 
DAYS THEN 1 TABLET DAILY Active                                 Not available

 

                triamcinolone acetonide 0.1 % topical ointment Active          

               Not available

 

                vitamin d3  25 mcg (1000 ut) tabs Active                       

  Not available



                                                                                
                                                                                
      



Problems

                      



                Name                   Status                   Onset Date      

             

Source                                                  

 

                Simple Obesity  Active          2020      History

 

                Body Mass Index 30+ - Obesity Active          2020      Hi

story

 

                Clinical Finding Active          2020      History

 

                Clinical Finding Active          2020      History

 

                Seasonal Allergic Rhinitis Active          2020      Histo

ry

 

                Disorder of Upper Respiratory System Active          2020 

     History

 

                Eruption        Active          2020      History

 

                Vitamin D Deficiency Active          2020      History

 

                Atopic Dermatitis Active          2020      History

 

                Procedure by Method Active          2020      History

 

                Patient Asked to Attend Active          2020      History

 

                Emotional State Finding Active          2020      History



                                                                                
                                                                                
                                    



Procedures

          







                                        Notes: Rt ankle reconstruction , Cholecy

sectomy 2009, Tubal ligation 

2014



                                                                              



Results

                          



                                        Lab Results

 

                                         



                



      Date  Name  Specimen Result Interpretation Description Value Range Status 

Address 



 

        11/10/2020 CBC W/ Auto Diff         Normal           White Blood Count  

7.3 10  4.0-10.0 10 

Final                                   [facility]: [address] [place] [address]

 

                          Normal         Red Blood Count  4.57 10 4.00-5.40 10

 [facility]: [address] [place] [address]

 

                          Low            Hemoglobin  11.6 g/dL 12.0-15.5 g/dL 

[facility]: [address] [place] [address]

 

                          Normal         Hematocrit  37.9 % 36.0-47.0 % [facility]: 

[address] [place] [address]

 

                          Normal         Mean Corpuscular Volume  82.9 fL 80.0

-96.0 fL [facility]: [address] [place] [address]

 

                          Low            Mean Corpuscular Hemoglobin  25.4 pg 

27.0-33.0 pg [facility]: [address] [place] [address]

 

                          Low            Mean Corpuscular HGB Conc  30.6 g/dL 

32.0-36.5 g/dL [facility]: [address] [place] [address]

 

                          Normal         Red Cell Distribution Width  14.4 % 1

1.5-14.5 % [facility]: [address] [place] [address]

 

                          Normal         Platelet Count, Automated  223 10 150

-450 10 [facility]: 0 [facility]

 

                          Normal         Neutrophils %  64.1 % 36.0-66.0 % [facility]: 0 [facility]

 

                          Normal         Lymph %  28.6 % 24.0-44.0 % Final  [facility]: 0 

[facility]

 

                          High           Mono %  6.0 %  0.0-5.0 % [facility]: 0 

[facility]

 

                          Normal         Eos %  0.7 %  0.0-3.0 % [facility]: [address] [place] [address]

 

                          Normal         Baso %  0.3 %  0.0-1.0 % [facility]: [address] 

[facility]

 

                          Normal         Immature Granulocyte %  0.3 %  0-3.0 

% [facility]: [address] [place] [address]

 

                          Normal         Nucleated Red Blood Cell %  0.0 %  0-

0 %  [facility]: [address] [place] [address]

 

                          Normal         Neutrophils #  4.7 10 1.5-8.5 10 [facility]:

[address] [place] [address]

 

                          Normal         Lymph #  2.1 10 1.5-5.0 10 [facility]: [address] [place] [address]

 

                          Normal         Mono #  0.4 10 0.0-0.8 10 [facility]: [address] [place] [address]

 

                          Normal         Eos #  0.1 10 0.0-0.5 10 [facility]: [address] [place] [address]

 

                          Normal         Baso #  0.0 10 0.0-0.2 10 [facility]: [address] [place] [address]

 

           11/10/2020 HbA1C (Hemoglobin a1C), Blood Blood  venous Normal        

         Hemoglobin a1C 

                5.6 %                          [facility]: [address] [place] [address]

 

                      Blood  venous High             Estimated Average Glucose

  114 mg/dL  mg/dL 

[facility]: [address] [place] [address]

 

          11/10/2020 CMP, Serum or Plasma Blood  venous High                 Glu

cose, Fasting  102 

mg/dL                mg/dL        [facility]

nter: [address] [place] [address]

 

                   Blood  venous Normal         Blood Urea Nitrogen  12 mg/dL 

7-18 mg/dL [facility]: [address] [place] [address]

 

                      Blood  venous Normal           Creatinine for GFR  0.73 

mg/dL 0.55-1.30 mg/dL [facility]: [address] [place] [address]

 

                   Blood  venous Normal         Glomerular Filtration Rate  > 

60.0 >58    [facility]: [address] [place] [address]

 

                   Blood  venous Normal         Sodium Level  141 mEq/L 136-14

5 mEq/L [facility]: [address] [place] [address]

 

                   Blood  venous Normal         Potassium Serum  4.5 mEq/L 3.5

-5.1 mEq/L [facility]: [address] [facility]

 

                   Blood  venous Normal         Chloride Level  107 mEq/L 98-1

07 mEq/L [facility]: [address] [facility]

 

                   Blood  venous Normal         Carbon Dioxide Level  28 mEq/L

 21-32 mEq/L [facility]: [address] [place] [address]

 

                   Blood  venous Low            Anion Gap  6 mEq/L 8-16 mEq/L 

[facility]: [address] [place] [address]

 

                   Blood  venous Normal         Calcium Level  9.0 mg/dL 8.5-1

0.1 mg/dL [facility]: 0 [facility]

 

                   Blood  venous Normal         AST/SGOT  12 U/L 7-37 U/L [facility]

l  [facility]: [address] [place] [address]

 

                   Blood  venous Normal         ALT/SGPT  16 U/L 12-78 U/L [facility]: [address] [place] [address]

 

                   Blood  venous Normal         Alkaline Phosphatase  88 U/L 4

5-117 U/L [facility]: [address] [place] [address]

 

                   Blood  venous Normal         Bilirubin,total  0.3 mg/dL 0.2

-1.0 mg/dL [facility]: [address] [place] [address]

 

                   Blood  venous Normal         Total Protein  7.8 gm/dL 6.4-8

.2 gm/dL [facility]: [address] [place] [address]

 

                   Blood  venous Normal         Albumin  3.6 gm/dL 3.2-5.2 gm/

dL [facility]: [address] [place] [address]

 

                   Blood  venous Low            Albumin/globulin Ratio  0.9   

 1.2-2.2 [facility]: [address] [place] [address]

 

          11/10/2020 Lipid Panel, Blood Blood  venous Normal               Trigl

ycerides Level  59 

mg/dL               <150 mg/dL          [facility]

nter: [address] [place] [address]

 

                   Blood  venous Normal         Cholesterol Level  144 mg/dL <

200 mg/dL [facility]: [address] [place] [address]

 

                   Blood  venous Normal         HDL Cholesterol  52 mg/dL >40 

mg/dL Final  [facility]: [address] [facility]

 

                   Blood  venous Normal         LDL Cholesterol  80 mg/dL <100

 mg/dL Final  [facility]: [address] [facility]

 

                   Blood  venous Normal         Non-hdl-c  92 mg/dL       Fin

[facility]: [address] [facility]

 

                   Blood  venous Normal         Cholesterol Risk Ratio  2.769 

 <5     Final  [facility]: [address] [facility]

 

          11/10/2020 Vitamin D, 25-Hydroxy, Total, Serum           Low          

        Total 25(Oh) Vitamin D  

24.7 NG/mL          30.0-100.0 NG/mL    Final               [facility]

nter: [address] [facility]



                                                                                
                                     



Past Encounters

                      



                                        2020

Generalized Anxiety Disorder; Posttraumatic Stress Disorder; Moderate Recurrent 
Major Depression

Katarzyna Escalante [facility]: [address] [place] [address], Ph. [phone]

 

                                        2020

Generalized Anxiety Disorder; Posttraumatic Stress Disorder; Moderate Recurrent 
Major Depression

Katarzyna Escalante [facility]: [address] [place] [address], Ph. [phone]

 

                                        11/10/2020

Adult Health Examination

Maryana Jarrett [facility]: [address] [place] [address], Ph. [phone]



                                                                                
                           



Social History

          



None recorded.                                                                  
           



Vaccine List

          



None recorded.                                                                  
           



Plan of Care

                      



                Reminders                                                       

    Provider    

           

 

                Appointments    None recorded.                 

 

                Lab             None recorded.                 

 

                Referral        None recorded.                 

 

                Procedures      None recorded.                 

 

                Surgeries       None recorded.                 

 

                Imaging         None recorded.                 



                                                                              



Vitals

          2020





                    Height              Weight              Blood Pressure 

 

                     63 in               227 lbs             97/61 mm[Hg]  



2020





                    Height              Weight              Blood Pressure 

 

                     63 in               222 lbs 2.08 oz     125/84 mm[Hg]  



2020





                    Height              Weight              Blood Pressure 

 

                     63 in               220 lbs 3.04 oz     131/87 mm[Hg]  



2020





                    Height              Weight              Blood Pressure 

 

                     63 in               213 lbs 6.08 oz     113/80 mm[Hg]

## 2021-02-01 NOTE — CCD
Summarization Of Episode

                             Created on: 2021



BEVERLEY LAYTON RICCARDO

External Reference #: [id number]

: 1975

Sex: Female



Demographics





                          Address                   [address]

[place]  [address]

 

                          Home Phone                [phone]

 

                          Preferred Language        English

 

                          Marital Status            Single

 

                          Mu-ism Affiliation     NONE

 

                          Race                      White

 

                          Ethnic Group              Not  or 





Author





                          Author                    [facility]

 

                          Organization              [facility]

 

                          Address                   Unknown

 

                          Phone                     Unavailable







Support





                Name            Relationship    Address         Phone

 

                STEWART WILSONALLE Next Of Kin     Unknown         Unavailable

 

                    Maryana Vega Next Of Kin         [address] [place] [address]                    [phone]

 

                     [place]  [address]                    [phone]

 

                    [place] [address]                    -

 

                    [place] [address]                    [phone]

 

                    EDWARDO SAGASTUME      Next Of [facility]         [address]

[place] [address]                    [phone]

 

                    [place] [address]                    [phone]

 

                    [facility] Next Of [facility]         [address] [place]  [address]                    [phone]

 

                    SSV*                Next Of Kin         [address] [place] [address]                    [phone]

 

                    STUDENT             Next Of Kin         -

                          -, -  -                   [phone]

 

                    [place] [address]                    [phone]

 

                    [place] [address]                    [phone]

 

                    Stewart Lowery   [place] [id number]                    Unavailable







Care Team Providers





                    Care Team Member Name Role                Phone

 

                    JOSE ALFREDO TAMEZ MD Unavailable         Unavailable

 

                    ANTECOL, JOSE ALFREDO HOWE MD Unavailable         Unavailable

 

                    ANTECOL, JOSE ALFREDO HOWE MD Unavailable         Unavailable

 

                    ANTECOL, JOSE ALFREDO HOWE MD Unavailable         Unavailable

 

                    ANTECOL, JOSE ALFREDO HOWE MD Unavailable         Unavailable

 

                    ANTECOL, JOSE ALFREDO HOWE MD Unavailable         Unavailable

 

                    ANTECOL, JOSE ALFREDO HOWE MD Unavailable         Unavailable

 

                    ANTECOL, JOSE ALFREDO HOWE MD Unavailable         Unavailable

 

                    ANTECOLJOSE ALFREDO MD Unavailable         Unavailable

 

                    ANTECOL, JOSE ALFREDO HOWE MD Unavailable         Unavailable

 

                    ANTECOL, JOSE ALFREDO HOWE MD Unavailable         Unavailable

 

                    ANTECOL, JOSE ALFREDO HOWE MD Unavailable         Unavailable

 

                    ANTECOL, JOSE ALFREDO HOWE MD Unavailable         Unavailable

 

                    ANTECOL, JOSE ALFREDO HOWE MD Unavailable         Unavailable

 

                    ANTECOL, JOSE ALFREDO HOWE MD Unavailable         Unavailable

 

                    ANTECOL, JOSE ALFREDO HOWE MD Unavailable         Unavailable

 

                    ANTECOL, JOSE ALFREDO HOWE MD Unavailable         Unavailable

 

                    ANTECOL, JOSE ALFREDO HOWE MD Unavailable         Unavailable

 

                    ANTECOL, JOSE ALFREDO HOWE MD Unavailable         Unavailable

 

                    ANTECOL, JOSE ALFREDO HOWE MD Unavailable         Unavailable

 

                    ANTECOL, JOSE ALFREDO HOWE MD Unavailable         Unavailable

 

                    ANTECOL, JOSE ALFREDO HOWE MD Unavailable         Unavailable

 

                    ANTECOL, JOSE ALFREDO HOWE MD Unavailable         Unavailable

 

                    ANTECOL, JOSE ALFREDO HOWE MD Unavailable         Unavailable

 

                    ANTECOL, JOSE ALFREDO HOWE MD Unavailable         Unavailable

 

                    ANTECOL, JOSE ALFREDO HOWE MD Unavailable         Unavailable

 

                    ANTECOL, JOSE ALFREDO HOWE MD Unavailable         Unavailable

 

                    ANTECOL, JOSE ALFREDO HOWE MD Unavailable         Unavailable

 

                    ANTECOL, JOSE ALFREDO HOWE MD Unavailable         Unavailable

 

                    ANTECOL, JOSE ALFREDO HOWE MD Unavailable         Unavailable

 

                    ANTECOL, JOSE ALFREDO HOWE MD Unavailable         Unavailable

 

                    ANTECOL, JOSE ALFREDO HOWE MD Unavailable         Unavailable

 

                    ANTECOL, JOSE ALFREDO HOWE MD Unavailable         Unavailable

 

                    ANTECOL, JOSE ALFREDO HOWE MD Unavailable         Unavailable

 

                    ANTECOL, JOSE ALFREDO HOWE MD Unavailable         Unavailable

 

                    ANTECOL, JOSE ALFREDO HOWE MD Unavailable         Unavailable

 

                    ANTECOL, JOSE ALFREDO HOWE MD Unavailable         Unavailable

 

                    ANTECOL, JOSE ALFREDO HOWE MD Unavailable         Unavailable

 

                    ANTECOL, JSOE ALFREDO HOWE MD Unavailable         Unavailable

 

                    ANTECOL, JOSE ALFREDO HOWE MD Unavailable         Unavailable

 

                    ANTECOL, JOSE ALFREDO HOWE MD Unavailable         Unavailable

 

                    ANTECOL, JOSE ALFREDO HOWE MD Unavailable         Unavailable

 

                    ANTECOL, JOSE ALFREDO HOWE MD Unavailable         Unavailable

 

                    ANTECOL, JOSE ALFREDO HOWE MD Unavailable         Unavailable

 

                    ANTECOL, JOSE ALFREDO HOWE MD Unavailable         Unavailable

 

                    ANTECOL, JOSE ALFREDO HOWE MD Unavailable         Unavailable

 

                    ANTECOL, JOSE ALFREDO HOWE MD Unavailable         Unavailable

 

                    ANTECOL, JOSE ALFREDO HOWE MD Unavailable         Unavailable

 

                    ANTECOL, JOSE ALFREDO HOWE MD Unavailable         Unavailable

 

                    ANTECOL, JOSE ALFREDO HOWE MD Unavailable         Unavailable

 

                    ANTECOL, JOSE ALFREDO HOWE MD Unavailable         Unavailable

 

                    ANTECOL, JOSE ALFREDO HOWE MD Unavailable         Unavailable

 

                    ANTECOL, JOSE ALFREDO HOWE MD Unavailable         Unavailable

 

                    ANTECOL, JOSE ALFREDO HOWE MD Unavailable         Unavailable

 

                    ANTECOL, JOSE ALFREDO HOWE MD Unavailable         Unavailable

 

                    Arian,  Lakeisha DO Unavailable         Unavailable

 

                    Arian,  Lakeisha DO Unavailable         Unavailable

 

                    Arian,  Lakeisha DO Unavailable         Unavailable

 

                    Arian,  Lakeisha DO Unavailable         Unavailable

 

                    Arian,  Lakeisha DO Unavailable         Unavailable

 

                    Arian,  Lakeisha DO Unavailable         Unavailable

 

                    Arian,  Lakeisha DO Unavailable         Unavailable

 

                    Arian,  Lakeisha DO Unavailable         Unavailable

 

                    Arian,  Lakeisha DO Unavailable         Unavailable

 

                    Arian,  Lakeisha DO Unavailable         Unavailable

 

                    Arian,  Lakeisha DO Unavailable         Unavailable

 

                    Arian,  Lakeisha DO Unavailable         Unavailable

 

                    Arian,  Lakeisha DO Unavailable         Unavailable

 

                    Arian,  Lakeisha DO Unavailable         Unavailable

 

                    Arian,  Lakeisha DO Unavailable         Unavailable

 

                    Arian,  Lakeisha DO Unavailable         Unavailable

 

                    Arian,  Lakeisha DO Unavailable         Unavailable

 

                    Arian,  Lakeisha DO Unavailable         Unavailable

 

                    Arian,  Lakeisha DO Unavailable         Unavailable

 

                    Arian,  Lakeisha DO Unavailable         Unavailable

 

                    Arian,  Lakeisha DO Unavailable         Unavailable

 

                    Arian,  Lakeisha DO Unavailable         Unavailable

 

                    Arian,  Lakeisha DO Unavailable         Unavailable

 

                    Arian,  Lakeisha DO Unavailable         Unavailable

 

                    Arian,  Lakeisha DO Unavailable         Unavailable

 

                    Arian,  Lakeisha DO Unavailable         Unavailable

 

                    Arian,  Lakeisha DO Unavailable         Unavailable

 

                    Arian,  Lakeisha DO Unavailable         Unavailable

 

                    Arian,  Lakeisha DO Unavailable         Unavailable

 

                    Arian,  Lakeisha DO Unavailable         Unavailable

 

                    Arian,  Lakeisha DO Unavailable         Unavailable

 

                    Arian,  Lakeisha DO Unavailable         Unavailable

 

                    Arian,  Lakeisha DO Unavailable         Unavailable

 

                    Arian,  Lakeisha DO Unavailable         Unavailable

 

                    Arian,  Lakeisha DO Unavailable         Unavailable

 

                    Arian,  Lakeisha DO Unavailable         Unavailable

 

                    Arian,  Lakeisha DO Unavailable         Unavailable

 

                    Arian,  Lakeisha DO Unavailable         Unavailable

 

                    Arian,  Lakeisha DO Unavailable         Unavailable

 

                    Arian,  Lakeisha DO Unavailable         Unavailable

 

                    Arian,  Lakeisha DO Unavailable         Unavailable

 

                    Arian,  Lakeisha DO Unavailable         Unavailable

 

                    Arian,  Lakeisha DO Unavailable         Unavailable

 

                    Arian,  Lakeisha DO Unavailable         Unavailable

 

                    Arian,  Lakeisha DO Unavailable         Unavailable

 

                    Arian,  Lakeisha DO Unavailable         Unavailable

 

                    Arian,  Lakeisha DO Unavailable         Unavailable

 

                    Arian,  Lakeisha DO Unavailable         Unavailable

 

                    Arian,  Lakeisha DO Unavailable         Unavailable

 

                    Arian,  Lakeisha DO Unavailable         Unavailable

 

                    Kolby, A Maryana FNP Unavailable         Unavailable

 

                    Fairborn, A Maryana FNP Unavailable         Unavailable

 

                    Fairborn, A Maryana FNP Unavailable         Unavailable

 

                    Fairborn, A Maryana FNP Unavailable         Unavailable

 

                    Fairborn, A Maryana FNP Unavailable         Unavailable

 

                    Fairborn, A Maryana FNP Unavailable         Unavailable

 

                    Fairborn, A Maryana FNP Unavailable         Unavailable

 

                    Fairborn, A Maryana FNP Unavailable         Unavailable

 

                    Fairborn, A Maryana FNP Unavailable         Unavailable

 

                    Fairborn, A Maryana FNP Unavailable         Unavailable

 

                    Fairborn, A Maryana FNP Unavailable         Unavailable

 

                    Fairborn, A Maryana FNP Unavailable         Unavailable

 

                    Fairborn, A Maryana FNP Unavailable         Unavailable

 

                    Fairborn, A Maryana FNP Unavailable         Unavailable

 

                    Fairborn, A Maryana FNP Unavailable         Unavailable

 

                    Fairborn, A Maryana FNP Unavailable         Unavailable

 

                    Fairborn, A Maryana FNP Unavailable         Unavailable

 

                    Fairborn, A Maryana FNP Unavailable         Unavailable

 

                    Fairborn, A Maryana FNP Unavailable         Unavailable

 

                    Fairborn, A Maryana FNP Unavailable         Unavailable

 

                    Fairborn, A Maryana FNP Unavailable         Unavailable

 

                    Fairborn, A Maryana FNP Unavailable         Unavailable

 

                    Kolby, A Maryana FNP Unavailable         Unavailable

 

                    Kolby, A Maryana FNP Unavailable         Unavailable

 

                    Kolby, A Maryana FNP Unavailable         Unavailable

 

                    Kolby, A Maryana FNP Unavailable         Unavailable

 

                    Kolby, A Maryana FNP Unavailable         Unavailable

 

                    Kolby, A Maryana FNP Unavailable         Unavailable

 

                    Katarzyna Escalante Unavailable         [phone]

 

                    Katarzyna Escalante Unavailable         [phone]

 

                    Ali, Mohsin MD     Unavailable         Unavailable

 

                    Ali, Mohsin MD     Unavailable         Unavailable

 

                    Ali, Mohsin MD     Unavailable         Unavailable

 

                    Ali, Mohsin MD     Unavailable         Unavailable

 

                    Ali, Mohsin MD     Unavailable         Unavailable

 

                    Ali,  Mohsin MD     Unavailable         Unavailable

 

                    Ali,  Mohsin MD     Unavailable         Unavailable

 

                    Ali,  Mohsin MD     Unavailable         Unavailable

 

                    Ali,  Mohsin MD     Unavailable         Unavailable

 

                    Ali,  Mohsin MD     Unavailable         Unavailable

 

                    Ali,  Mohsin MD     Unavailable         Unavailable

 

                    Ali,  Mohsin MD     Unavailable         Unavailable

 

                    Ali,  Mohsin MD     Unavailable         Unavailable

 

                    Ali,  Mohsin MD     Unavailable         Unavailable

 

                    Ali,  Mohsin MD     Unavailable         Unavailable

 

                    Ali,  Mohsin MD     Unavailable         Unavailable

 

                    Ali,  Mohsin MD     Unavailable         Unavailable

 

                    Ali,  Mohsin MD     Unavailable         Unavailable

 

                    Ali,  Mohsin MD     Unavailable         Unavailable

 

                    Ali,  Mohsin MD     Unavailable         Unavailable

 

                    Ali,  Mohsin MD     Unavailable         Unavailable

 

                    Ali,  Mohsin MD     Unavailable         Unavailable

 

                    Ali,  Mohsin MD     Unavailable         Unavailable

 

                    Ali,  Mohsin MD     Unavailable         Unavailable

 

                    Ali,  Mohsin MD     Unavailable         Unavailable

 

                    Ali,  Mohsin MD     Unavailable         Unavailable

 

                    Ali,  Mohsin MD     Unavailable         Unavailable

 

                    Ali,  Mohsin MD     Unavailable         Unavailable

 

                    Ali,  Mohsin MD     Unavailable         Unavailable

 

                    Ali,  Mohsin MD     Unavailable         Unavailable

 

                    Ali,  Mohsin MD     Unavailable         Unavailable

 

                    Ali,  Mohsin MD     Unavailable         Unavailable

 

                    Ali,  Mohsin MD     Unavailable         Unavailable

 

                    Ali,  Mohsin MD     Unavailable         Unavailable

 

                    Ali,  Mohsin MD     Unavailable         Unavailable

 

                    Ali, Mohsin MD     Unavailable         Unavailable

 

                    Ali, Mohsin MD     Unavailable         Unavailable

 

                    Ali, Mohsin MD     Unavailable         Unavailable

 

                    Ali, Mohsin MD     Unavailable         Unavailable

 

                    Ali, Mohsin MD     Unavailable         Unavailable

 

                    Ali, Mohsin MD     Unavailable         Unavailable

 

                    Ali,  Mohsin MD     Unavailable         Unavailable

 

                    Ali, Mohsin MD     Unavailable         Unavailable

 

                    Ali, Mohsin MD     Unavailable         Unavailable

 

                    Ali,  Mohsin MD     Unavailable         Unavailable

 

                    Ali, Mohsin MD     Unavailable         Unavailable

 

                    Ali, Mohsin MD     Unavailable         Unavailable

 

                    Ali, Mohsin MD     Unavailable         Unavailable

 

                    Ali,  Mohsin MD     Unavailable         Unavailable

 

                    Kolby, Maryana FNP FNP Unavailable         Unavailable

 

                    Kolby, A Maryana FNP Unavailable         Unavailable

 

                    Kolby, A Maryana FNP Unavailable         Unavailable

 

                    Kolby, A Maryana FNP Unavailable         Unavailable

 

                    Kolby, A Maryana FNP Unavailable         Unavailable

 

                    Kolby, A Maryana FNP Unavailable         Unavailable

 

                    Kolby, A Maryana FNP Unavailable         Unavailable

 

                    Kolby, A Maryana FNP Unavailable         Unavailable

 

                    Kolby, A Marayna FNP Unavailable         Unavailable

 

                    Kolby, A Maryana FNP Unavailable         Unavailable

 

                    Kolby, A Maryana FNP Unavailable         Unavailable

 

                    Kolby, A Maryana FNP Unavailable         Unavailable

 

                    Kolby, A Maryana FNP Unavailable         Unavailable

 

                    Kolby, A Maryana FNP Unavailable         Unavailable

 

                    Kolby, A Maryana FNP Unavailable         Unavailable

 

                    Kolby, A Maryana FNP Unavailable         Unavailable

 

                    Kolby, A Maryana FNP Unavailable         Unavailable

 

                    Kolby, A Maryana FNP Unavailable         Unavailable

 

                    Kolby, A Maryana FNP Unavailable         Unavailable

 

                    Kolby, A Maryana FNP Unavailable         Unavailable

 

                    Kolby, A Maryana FNP Unavailable         Unavailable

 

                    Kolby, A Maryana FNP Unavailable         Unavailable

 

                    Kolby, A Maryana FNP Unavailable         Unavailable

 

                    Kolby, A Maryana FNP Unavailable         Unavailable

 

                    Kolby, A Maryana FNP Unavailable         Unavailable

 

                    Kolby, A Maryana FNP Unavailable         Unavailable

 

                    Kolby, A Maryana FNP Unavailable         Unavailable

 

                    Kolby, A Maryana FNP Unavailable         Unavailable

 

                    Kolby, A Maryana FNP Unavailable         Unavailable

 

                    [facility],  [facility]        Unavailable         Unavailable



                                  



Re-disclosure Warning

          The records that you are about to access may contain information from 
federally-assisted alcohol or drug abuse programs. If such information is 
present, then the following federally mandated warning applies: This information
has been disclosed to you from records protected by federal confidentiality 
rules (42 CFR part 2). The federal rules prohibit you from making any further 
disclosure of this information unless further disclosure is expressly permitted 
by the written consent of the person to whom it pertains or as otherwise 
permitted by 42 CFR part 2. A general authorization for the release of medical 
or other information is NOT sufficient for this purpose. The Federal rules 
restrict any use of the information to criminally investigate or prosecute any 
alcohol or drug abuse patient.The records that you are about to access may 
contain highly sensitive health information, the redisclosure of which is 
protected by Article 27-F of the [place] Public Health law. If you 
continue you may have access to information: Regarding HIV / AIDS; Provided by 
facilities licensed or operated by the [facility] Office of Mental Health; 
or Provided by the [facility] Office for People With Developmental 
Disabilities. If such information is present, then the following [place] 
mandated warning applies: This information has been disclosed to you from 
confidential records which are protected by state law. State law prohibits you 
from making any further disclosure of this information without the specific 
written consent of the person to whom it pertains, or as otherwise permitted by 
law. Any unauthorized further disclosure in violation of state law may result in
a fine or longterm sentence or both. A general authorization for the release of 
medical or other information is NOT sufficient authorization for further disc
losure.                                                                         
    



Allergies and Adverse Reactions

          



           Type       Description Substance  Reaction   Status     Data Source(s

)

 

           Allergy to substance Allergy to substance Penicillin                 

      [facility] ([facility])

 

           Allergy to substance Allergy to substance Penicillin                 

      [facility] ([facility])

 

           Allergy to substance Allergy to substance Penicillin                 

      [facility] ([facility])

 

           Drug allergy PENICILLIN PENICILLIN Anaphylaxsis SV             [facility]



                                                                                
                                     



Encounters

          



           Encounter  Providers  Location   Date       Indications Data Source(s

)

 

                Outpatient      Attender: Mohsin Ali MD Main office - [place] 

2021 08:00:00 

AM EST                                              [facility] ([facility])

 

           Outpatient Attender: SHYAM TAMEZ MD Main Office 2020 08:00:00

 AM EST            

[facility] ([facility])

 

                                        Katarzyna Escalante LMSW: [address] [place] [address], Ph. [phone]                  Attender: Katarzyna Escalante [facility]         2020 12:00:00 AM EST                     [facility] ([facility])

 

                                        Katarzyna Escalante LMSW: [address] [place] [address], Ph. [phone]                  Attender: Katarzyna Escalante [facility]         2020 12:00:00 AM EST                     [facility] ([facility])

 

                                        Katarzyna Escalante LMSW: [address] [place] [address], Ph. [phone]                  Attender: Katarzyna Escalante [facility]         2020 12:00:00 AM EST                     [facility] ([facility])

 

                                        MARIA Alvarez-BC: [address] [place] [address], Ph. [phone]                  Attender: Maryana SIFUENTES [facility] [facility]       11/10/2020 12:00:00 AM EST                     [facility] ([facility])

 

                                        MARIA Alvarez-BC: [address]

[address], [place] [address], Ph. [phone]                  Attender: Maryana SIFUENTES [facility]       11/10/2020 12:00:00 AM EST                     [facility] ([facility])

 

                                        MARIA Alvarez-BC: [address]

[address], [place] [address], Ph. [phone]                  Attender: Maryana SIFUENTES [facility]       11/10/2020 12:00:00 AM EST                     [facility] ([facility])

 

           Outpatient Attender: SHYAM TAMEZ MD Main Office 10/28/2020 08:45:00

 AM EDJAYLEEN PARSONS ([facility])

 

           Outpatient Attender: MARIA SIFUENTES          10/19/2020 08:01:03 P

M EDT            [facility]

 

           Outpatient Attender: Maryana COONEY         10/16/2020 12:1

7:01 PM EDT            [facility]

 

           Outpatient Attender: MARIA COONEY         10/15/2020 03:27:01 P

M EDT            [facility]

 

           Outpatient Attender: MARIA COONEY         10/14/2020 09:48:00 A

M EDT            [facility]

 

           Outpatient Attender: Maryana COONEY         10/13/2020 11:1

4:00 AM EDT            [facility]

 

           Outpatient Attender: MARIA COONEY         10/05/2020 06:31:57 A

M EDT            [facility]

 

           Outpatient Attender: MARIA COONEY         10/02/2020 02:27:00 P

M EDT            [facility]

 

           Outpatient Attender: Maryana COONEY         2020 02:5

6:04 PM EDT            [facility]

 

           Outpatient Attender: MARIA COONEY         2020 12:44:00 P

M EDT            [facility]

 

           Outpatient Attender: MARIA COONEY         2020 01:37:01 P

M EDT            [facility]

 

           Outpatient Attender: Maryana SIFUENTES FP         2020 04:0

3:01 PM EDT            [facility]

 

           Outpatient Attender: MARIA SIFUENTES FP         2020 04:03:01 P

M EDT            [facility]

 

           Outpatient Attender: MARIA SIFUENTES FP         2020 01:55:02 P

M EDT            [facility]

 

           Outpatient Attender: Maryana SIFUENTES FP         2020 01:5

5:02 PM EDT            [facility]

 

           Outpatient Attender: MARIA SIFUENTES FP         2020 03:00:01 P

M EDT            [facility]

 

           Outpatient Attender: Maryana SIFUENTES FP         2020 03:1

6:01 PM EDT            [facility]

 

           Outpatient Attender: Maryana SIFUENTES FP         2020 09:4

0:00 AM EDT            [facility]

 

           Outpatient Attender: Maryana SIFUENTES FP         2020 04:2

2:59 AM EDT            [facility]

 

           Outpatient Attender: MARIA SIFUENTES FP         2020 02:52:02 P

M EDT            [facility]

 

           Outpatient Attender: Maryana SIFUENTES FP         2020 02:5

2:01 PM EDT            [facility]

 

           Outpatient Attender: MARIA SIFUENTES FP         2020 02:52:01 P

M EDT            [facility]

 

           Outpatient Attender: MARIA SIFUENTES FP         2020 01:34:00 P

M EDT            [facility]

 

           Outpatient Attender: Maryana SIFUENTES FP         08/10/2020 08:4

3:01 AM EDT            [facility]

 

           Outpatient Attender: MARIA SIFUENTES FP         2020 07:45:00 A

M EDT            [facility]                 [address] [place] [address] 2020 

12:00:00 AM EDT                                     eCW1 ([facility])

 

           Outpatient Attender: Maryana SIFUENTES FP         2020 10:1

0:02 PM EDT            [facility]

 

           Outpatient Attender: MARIA SIFUENTES FP         2020 03:06:01 P

M EDT            [facility]

 

           Outpatient Attender: MARIA Jarrett FNP          2020 12:08:03 A

M EDT            [facility]

 

           Outpatient Attender: Maryana LUGOP          2020 12:0

8:01 AM EDT            [facility]

 

           Outpatient Attender: MARIA Jarrett FNP          2020 03:06:02 P

M EDT            [facility]

 

           Outpatient Attender: Maryana Jarrett FNP          2020 03:0

5:00 PM EDT            [facility]

 

           Outpatient Attender: MARIA Jarrett FNP          2020 03:05:00 P

M EDT            [facility]

 

           Outpatient Attender: MARIA LUGOP          2020 11:09:01 A

M EDT            [facility]                 [address] [place] [address] 2020 

12:00:00 AM EDT                                     [facility] ([facility])

 

           Outpatient Attender: Maryana Jarrett [facility]     2020 01:3

8:59 PM EDT            

[facility]

 

           Outpatient Attender: MARIA Jarrett [facility]     2020 11:50:02 A

M EDT            [facility]

 

           Outpatient Attender: MARIA Jarrett [facility]     2020 10:25:01 A

M EDT            [facility]

 

           Outpatient Attender: Maryana Jarrett [facility]     2020 03:0

7:02 PM EDT            

[facility]

 

           [facility]: [facility]     2020 10:1

4:02 AM EDT            

[facility]

 

           [facility]: [facility]     2020 02:51:01 P

M EDT            [facility]

 

           Outpatient Attender: Maryana LUGO WATND     2020 01:5

0:02 PM EDT            

[facility]

 

           Outpatient Attender: MARIA LUGO WATND     2020 01:50:01 P

M EDT            [facility]

 

           Outpatient Attender: Maryana LUGO WATND     2020 12:4

1:36 PM EDT            

[facility]

 

           Outpatient Attender: MARIA SIFUENTES WATND     2020 03:28:00 P

M EDT            [facility]

 

           Outpatient Attender: COOKIE MUSE WATNDC     2020 03:27:01 PM EDT

            [facility]

 

           Outpatient Referrer: Lakeisha Don DO            2020 03:22

:00 PM EST            

[facility]

 

           [facility]     2020 01:09:02 PM EST

            [facility]

 

           Outpatient Att[facility]: [facility]     2020 11:20:23 AM EST

            [facility]

 

           Outpatient Attender: COOKIE MICHEL ALL        2020 12:00:31 AM EST

            [facility]

 

           Outpatient Attender: COOKIE MICHEL ALL        2020 03:35:59 PM EST

            [facility]

 

           Outpatient Referrer: Lakeisha Don DO            2020 07:36

:00 PM EST            

[facility]

 

                    [facility]                     [address], [place] 

[address]          2019 12:00:00 AM EST                     eCW1 ([facility])



                                                                                
                                                                                
                                                                                
                                                                                
                                                                                
                                                                                
                                                                                
                                                                                
                                        



Medications

          



          Medication Brand Name Start Date Product Form Dose      Route     Admi

nistrative 

Instructions Pharmacy Instructions Status     Indications Reaction   Description

 Data 

Source(s)

 

          25 mg               2020 12:00:00 AM EST tablet    30           

       TAKE 1/2 TABLET BY MOUTH EVERY 

MORNING    TAKE 1/2 TABLET BY MOUTH EVERY MORNING SOLD: 2020              

                    [facility]

 

                    Losartan Potassium 25 MG Oral Tablet Losartan Potassium   12:00:00 AM 

EST                  ORAL                 active                      [facility] ([facility]

 

                    doxycycline hyclate 100 MG Oral Capsule Doxycycline Hyclate 

2020 12:00:00 

AM EST               ORAL                 active                      [facility] ([facility]

 

                    doxycycline hyclate 100 MG Oral Capsule DOXYCYCLINE HYCLATE 

2020 12:00:00 

AM EST       capsule      20                        TAKE ONE CAPSULE BY MOUTH TW

ICE A DAY FOR 10 DAYS TAKE ONE 

CAPSULE BY MOUTH TWICE A DAY FOR 10 DAYS SOLD: 2020                       

                 [facility]

 

        10 mg           2020 12:00:00 AM EST tablet  30              TAKE 

1 TABLET BY MOUTH DAILY TAKE

1 TABLET BY MOUTH DAILY SOLD: 2020                                        

[facility]

 

        10 mg           2020 12:00:00 AM EST tablet  30              TAKE 

1 TABLET BY MOUTH DAILY TAKE

1 TABLET BY MOUTH DAILY SOLD: 2021                                        

[facility]

 

        10 mg           2020 12:00:00 AM EST tablet  30              TAKE 

1 TABLET BY MOUTH DAILY TAKE

1 TABLET BY MOUTH DAILY SOLD: 2020                                        

[facility]

 

           Cholecalciferol 1000 UNT Oral Tablet Vitamin D  10/27/2020 12:00:00 A

M EDT                       

ORAL                          active                                  [facility] ([facility]

 

        Loratadine 10 MG Oral Tablet Loratadine 10/27/2020 12:00:00 AM EDT      

                                   

active                                                          [facility] ([facility]

 

          25 mcg (1,000 unit)           2020 12:00:00 AM EDT tablet    30 

                 TAKE ONE TABLET BY 

MOUTH EVERY DAY, STARTING AFTER 50,000 UNIT CAPSULES ARE COMPLETED TAKE ONE 

TABLET BY MOUTH EVERY DAY, STARTING AFTER 50,000 UNIT CAPSULES ARE COMPLETED 

SOLD: 2020                                                 [facility]

 

          25 mcg (1,000 unit)           2020 12:00:00 AM EDT tablet    30 

                 TAKE ONE TABLET BY 

MOUTH EVERY DAY, STARTING AFTER 50,000 UNIT CAPSULES ARE COMPLETED TAKE ONE 

TABLET BY MOUTH EVERY DAY, STARTING AFTER 50,000 UNIT CAPSULES ARE COMPLETED 

SOLD: 2020                                                 [facility]

 

          25 mcg (1,000 unit)           2020 12:00:00 AM EDT tablet    30 

                 TAKE ONE TABLET BY 

MOUTH EVERY DAY, STARTING AFTER 50,000 UNIT CAPSULES ARE COMPLETED TAKE ONE 

TABLET BY MOUTH EVERY DAY, STARTING AFTER 50,000 UNIT CAPSULES ARE COMPLETED 

SOLD: 2020                                                 [facility]

 

          25 mcg (1,000 unit)           2020 12:00:00 AM EDT tablet    30 

                 TAKE ONE TABLET BY 

MOUTH EVERY DAY, STARTING AFTER 50,000 UNIT CAPSULES ARE COMPLETED TAKE ONE 

TABLET BY MOUTH EVERY DAY, STARTING AFTER 50,000 UNIT CAPSULES ARE COMPLETED 

SOLD: 2020                                                 [facility]

 

          0.1 %               2020 12:00:00 AM EDT ointment  15           

       APPLY TO AFFECTED AREA(S) OF 

ARMS TWO TIMES A DAY WHEN ITCHY, REPEAT FOR FLARES APPLY TO AFFECTED AREA(S) OF 

ARMS TWO TIMES A DAY WHEN ITCHY, REPEAT FOR FLARES SOLD: 2020             

                           [facility]

 

        10 mg           07/15/2020 12:00:00 AM EDT tablet  30              TAKE 

1 TABLET BY MOUTH DAILY TAKE

1 TABLET BY MOUTH DAILY SOLD: 2020                                        

[facility]

 

        10 mg           07/15/2020 12:00:00 AM EDT tablet  30              TAKE 

1 TABLET BY MOUTH DAILY TAKE

1 TABLET BY MOUTH DAILY SOLD: 2020                                        

[facility]

 

        10 mg           07/15/2020 12:00:00 AM EDT tablet  30              TAKE 

1 TABLET BY MOUTH DAILY TAKE

1 TABLET BY MOUTH DAILY SOLD: 10/05/2020                                        

[facility]

 

          1,250 mcg (50,000 unit)           2020 12:00:00 AM EDT capsule  

 4                   TAKE 1 CAP BY 

MOUTH ONCE WEEKLY FOR 12 WEEKS THEN CHANGE TO 1000 UNIT TAB` DAILY THEREAFTER 

TAKE 1 CAP BY MOUTH ONCE WEEKLY FOR 12 WEEKS THEN CHANGE TO 1000 UNIT TAB` DAILY
THEREAFTER   SOLD: 2020                                        [facility]

[facility]

 

          10 mg               2020 12:00:00 AM EDT tablet    35           

       TAKE 2 TABLETS BY MOUTH TWICE 

DAILY FOR 5 DAYS THEN 1 TABLET TWICE DAILY FOR 5 DAYS THEN 1 TABLET DAILY TAKE 2

TABLETS BY MOUTH TWICE DAILY FOR 5 DAYS THEN 1 TABLET TWICE DAILY FOR 5 DAYS 
THEN 1 TABLET DAILY SOLD: 05/15/2020                                        [facility]

 

          1,250 mcg (50,000 unit)           2020 12:00:00 AM EDT capsule  

 4                   TAKE 1 CAP BY 

MOUTH ONCE WEEKLY FOR 12 WEEKS THEN CHANGE TO 1000 UNIT TAB` DAILY THEREAFTER 

TAKE 1 CAP BY MOUTH ONCE WEEKLY FOR 12 WEEKS THEN CHANGE TO 1000 UNIT TAB` DAILY
THEREAFTER   SOLD: 05/15/2020                                        [facility]

[facility]

 

          1,250 mcg (50,000 unit)           2020 12:00:00 AM EDT capsule  

 4                   TAKE 1 CAP BY 

MOUTH ONCE WEEKLY FOR 12 WEEKS THEN CHANGE TO 1000 UNIT TAB` DAILY THEREAFTER 

TAKE 1 CAP BY MOUTH ONCE WEEKLY FOR 12 WEEKS THEN CHANGE TO 1000 UNIT TAB` DAILY
THEREAFTER   SOLD: 2020                                        [facility]

 

        10 mg           2020 12:00:00 AM EDT tablet  30              TAKE 

1 TABLET BY MOUTH DAILY TAKE

1 TABLET BY MOUTH DAILY SOLD: 2020                                        

[facility]

 

          0.05 %              2020 12:00:00 AM EDT ointment  15           

       APPLY TO AFFECTED AREA(S) ON 

ARMS AND LEGS TWICE DAILY AS NEEDED     APPLY TO AFFECTED AREA(S) ON ARMS AND LE

GS 

TWICE DAILY AS NEEDED SOLD: 2020                                        [facility]

 

        10 mg           2020 12:00:00 AM EDT tablet  30              TAKE 

1 TABLET BY MOUTH DAILY TAKE

1 TABLET BY MOUTH DAILY SOLD: 2020                                        

[facility]

 

          50 mcg/actuation           2020 12:00:00 AM EDT spray,suspension

 16                  SPARY 1 

SPRAY IN EACH NOSTRIL TWICE DAILY AS NEEDED SPARY 1 SPRAY IN EACH NOSTRIL TWICE 

DAILY AS NEEDED SOLD: 2020                                        [facility]

 

          250 mg              2020 12:00:00 AM EDT tablet    6            

       TAKE 2TABS BY MOUTH ON DAY 1 THEN

1 TABLET DAILY FOR 4 DAYS               TAKE 2TABS BY MOUTH ON DAY 1 THEN 1 TABL

ET DAILY FOR 4

DAYS         SOLD: 2020                                        [facility]

[facility]



                                                                                
                                                                                
                                                                                
                                                                           



Insurance Providers

          



             Payer name   Policy type / Coverage type Policy ID    Covered party

 ID Covered 

party's relationship to francois Policy Francois             Plan Information

 

          [facility] COMMUNITY PLAN MCDO           [id number]           SP           

       [id number]

 

          MEDICAID  M         [id number]            S                   [id number]

 

          [facility] O         [id number]           S              

     [id number]

 

          EMEDNY              [id number]            SP                  [id number]

 

          [facility] COMMUNITY PLAN MCDO           [id number]           SP           

       [id number]

 

          Medicaid  S         [id number]            S                   [id number]

 

          Managed Care - [facility] Community Plan P         [id number]           S     

              [id number]

 

          MEDICAID            [id number]            SP                  [id number]

 

          Medicaid  S         [id number]            S                   [id number]

 

          [facility] COMMUNITY PLAN MCDO           [id number]           SP           

       [id number]

 

          [facility]           [id number]           SP           

       [id number]

 

          [employer]           [id number]           SP            

      [id number]

 

          [facility] BLUE            [id number]           SP                  [id number]

[id number]

 

                              [id number]                                [id number]



                                                                                
                                                                                
                                                        



Problems, Conditions, and Diagnoses

          



           Code       Display Name Description Problem Type Effective Dates Data

 Source(s)

 

           [id number]   Hypersomnia Hypersomnia Problem    2021 12:00:00 AM 

EST [facility] 

([facility]

 

             [id number]    Malaise and fatigue Malaise and fatigue Problem      0

2021 12:00:00 AM

EST                                     [facility] ([facility]

 

                [id number]        Obstructive sleep apnea syndrome Obstructive sle

ep apnea syndrome 

Problem                   2021 12:00:00 AM EST [facility] ([facility]

 

             24145104     Essential hypertension Essential hypertension Problem 

     2020 

12:00:00 AM EST                         [facility] ([facility])

 

           [id number]  Morbid obesity Morbid obesity Problem    2020 12:00:

00 AM EST 

[facility] ([facility])

 

           05576925   Palpitations Palpitations Problem    2020 12:00:00 A

M EST [facility] 

([facility])

 

                    [id number]           Abnormal results of cardiovascular funct

ion studies Abnormal results 

of cardiovascular function studies Problem             2020 12:00:00 AM ES

T [facility] 

([facility])

 

           81281607   Snoring    Snoring    Problem    10/28/2020 12:00:00 AM ED

T [facility] ([facility]

 

                    [id number]           Elevated blood-pressure reading without 

diagnosis of hypertension 

Elevated blood-pressure reading without diagnosis of hypertension Problem       

            

10/28/2020 12:00:00 AM EDT              [facility] ([facility])

 

             [id number]    Electrocardiogram abnormal Electrocardiogram abnormal 

Problem      

10/28/2020 12:00:00 AM EDT              [facility] ([facility])

 

                [id number]       Dietary management surveillance Dietary manageme

nt surveillance 

Problem                   10/28/2020 12:00:00 AM EDT [facility] ([facility]

 

             [id number]     Type 2 diabetes mellitus Type 2 diabetes mellitus Prob

tariq      10/28/2020 

12:00:00 AM EDT                         [facility] ([facility])

 

           [id number]  Obesity    Obesity    Problem    10/28/2020 12:00:00 AM ED

T [facility] ([facility]

 

           14135486   Chest pain Chest pain Problem    10/28/2020 12:00:00 AM ED

T [facility] 

([facility])

 

                    F33.0               Major depressive disorder, recurrent, mi

ld DEPRESSIVE DISORDER, MAJOR, 

RECURRENT EPISODE, MILD                     2020 12:42:26 PM EDT [facility]

 

             300.02       GENERALIZED ANXIETY DISORDER GENERALIZED ANXIETY DISOR

SYLVESTER              2020 

12:42:26 PM EDT                         [facility]

 

           300.09     Anxiety depression Anxiety depression            

0 02:51:28 PM EDT [facility]

 

             783888076    Emotional state finding Emotional State Finding Proble

m      2020 

12:00:00 AM EDT                         [facility] ([facility])

 

             687749643    Emotional state finding Emotional State Finding Proble

      2020 

12:00:00 AM EDT                         [place] ([facility])

 

             [id number]    Emotional state finding Emotional State Finding Proble

      2020 

12:00:00 AM EDT                         [place] ([facility])

 

                    V65.8               Person consulting for explanation of exa

mination or test findings Person 

consulting for explanation of examination or test findings                      

     2020 12:07:55

AM EDT                                  [facility]

 

                    V70.0               Encounter for general adult medical exam

ination with abnormal findings 

Encounter for general adult medical examination with abnormal findings          

                 

2020 03:04:36 PM EDT              [facility]

 

             [id number]     Atopic dermatitis, unspecified Atopic dermatitis, unsp

ecified              

2020 03:04:36 PM EDT              [facility]

 

           268.9      vitamin D deficiency vitamin D deficiency             03:04:36 PM EDT 

[facility]

 

             [id number]    Patient asked to attend Patient Asked to Attend [facility]      2020 

12:00:00 AM EDT                         [place] ([facility])

 

             [id number]    Procedure by method Procedure by Method Problem      0

2020 12:00:00 AM

EDT                                     [place] ([facility])

 

           89207551   Atopic dermatitis Atopic Dermatitis Problem    2020 

12:00:00 AM EDT 

[facility])

 

             34572606     Vitamin D deficiency Vitamin D Deficiency Problem     

 2020 12:00:00 

AM EDT                                  [place] ([facility])

 

             [id number]    Patient asked to attend Patient Asked to Attend [facility]      2020 

12:00:00 AM EDT                         [place] ([facility])

 

             [id number]    Procedure by method Procedure by Method Problem      0

2020 12:00:00 AM

EDT                                     [place] ([facility])

 

           [id number]   Atopic dermatitis Atopic Dermatitis Problem    2020 

12:00:00 AM EDT 

[facility])

 

             [id number]     Vitamin D deficiency Vitamin D Deficiency Problem     

 2020 12:00:00 

AM EDT                                  [place] ([facility])

 

             [id number]    Patient asked to attend Patient Asked to Attend [facility]      2020 

12:00:00 AM EDT                         [facility] ([facility])

 

             [id number]    Procedure by method Procedure by Method Problem      0

2020 12:00:00 AM

EDT                                     [facility] ([facility])

 

           [id number]   Atopic dermatitis Atopic Dermatitis Problem    2020 

12:00:00 AM EDT 

[place] ([facility])

 

             [id number]     Vitamin D deficiency Vitamin D Deficiency Problem     

 2020 12:00:00 

AM EDT                                  [facility] ([facility])

 

           782.1      Eruption   Eruption              2020 11:48:08 AM ED

T [facility]

 

             J30.2        Other seasonal allergic rhinitis Other seasonal allerg

ic rhinitis              

2020 11:48:08 AM EDT              [facility]

 

                    [id number]            Acute upper respiratory infection, unspe

cified Acute upper respiratory 

infection, unspecified                     2020 11:48:08 AM EDT [facility]

 

           [id number]  Eruption   Eruption   Problem    2020 12:00:00 AM ED

T [place] ([facility])

 

                    [id number]           Disorder of upper respiratory system Dis

order of Upper Respiratory 

System              Problem             2020 12:00:00 AM EDT [place] ([facility])

 

             [id number]    Seasonal allergic rhinitis Seasonal Allergic Rhinitis 

Problem      

2020 12:00:00 AM EDT              [facility] ([facility])

 

           [id number]  Eruption   Eruption   Problem    2020 12:00:00 AM ED

T [place] ([facility])

 

                    [id number]           Disorder of upper respiratory system Dis

order of Upper Respiratory 

System              Problem             2020 12:00:00 AM EDT [place] ([facility])

 

             [id number]    Seasonal allergic rhinitis Seasonal Allergic Rhinitis 

Problem      

2020 12:00:00 AM EDT              [place] ([facility])

 

           [id number]  Eruption   Eruption   Problem    2020 12:00:00 AM ED

T [place] ([facility])

 

                    [id number]           Disorder of upper respiratory system Dis

order of Upper Respiratory 

System              Problem             2020 12:00:00 AM EDT [place] ([facility])

 

             [id number]    Seasonal allergic rhinitis Seasonal Allergic Rhinitis 

Problem      

2020 12:00:00 AM EDT              [facility] ([facility]

er)

 

           V85.37     BMI 37.0-37.9 BMI 37.0-37.9            2020 03:35:17

 PM EST [facility]

 

                    [id number]            Type 2 diabetes mellitus without complic

ations Type 2 diabetes mellitus

without complications                     2020 03:35:17 PM EST [facility]

 

             B34.9        Viral infection, unspecified Viral lower respiratory i

nfection              

2020 03:35:17 PM EST              [facility]

 

           278.00     Obesity    Obesity               2020 03:35:17 PM ES

T [facility]

 

           [id number]  Clinical finding Clinical Finding Problem    2020 12

:00:00 AM EST 

[place] ([facility])

 

           [id number]  Clinical finding Clinical Finding Problem    2020 12

:00:00 AM EST 

[facility] ([facility])

 

             [id number]    Body mass index 30+ - obesity Body Mass Index 30+ - Ob

esity Problem      

2020 12:00:00 AM EST              [facility] ([facility])

 

           [id number]  Simple obesity Simple Obesity Problem    2020 12:00:

00 AM EST 

[facility] ([facility])

 

           [id number]  Clinical finding Clinical Finding Problem    2020 12

:00:00 AM EST 

[facility] ([facility])

 

           [id number]  Clinical finding Clinical Finding Problem    2020 12

:00:00 AM EST 

[facility] ([facility])

 

             [id number]    Body mass index 30+ - obesity Body Mass Index 30+ - Ob

esity Problem      

2020 12:00:00 AM EST              [place] ([facility]

er)

 

           [id number]  Simple obesity Simple Obesity Problem    2020 12:00:

00 AM EST 

[facility] ([facility])

 

           [id number]  Clinical finding Clinical Finding Problem    2020 12

:00:00 AM EST 

[facility] ([facility])

 

           [id number]  Clinical finding Clinical Finding Problem    2020 12

:00:00 AM EST 

[facility] ([facility])

 

             [id number]    Body mass index 30+ - obesity Body Mass Index 30+ - Ob

esity Problem      

2020 12:00:00 AM EST              [place] ([facility]

er)

 

           [id number]  Simple obesity Simple Obesity Problem    2020 12:00:

00 AM EST 

[facility])



                                                                                
                                                                                
                                                                                
                                                                                
                                                                                
                                                                                
                                                                                
                                                                                
                                                                                



Surgeries/Procedures

          



             Procedure    Description  Date         Indications  Data Source(s)

 

             CV STRS TST XERS&/OR RX CONT ECG PHYS SI&R              2020 

12:00:00 AM EST              [facility]

([facility])

 

                    Ambulatory Blood Pressure Monitoring;Recording,Scanning,Inte

r,RPT                     2020 

12:00:00 AM ARTI PARSONS ([facility])

 

             ECHO TTHRC R-T 2D W/WOM-MODE COMPL SPEC&COLR DOP               12:00:00 AM EST              

[facility] ([facility])

 

             ECG ROUTINE ECG W/LEAST 12 LDS W/I&R              10/28/2020 12:00:

00 AM EDT              [facility] 

([facility])



                                                                                
                                     



Results

          



                    ID                  Date                Data Source

 

                    [id number] 11/10/2020 08:05:00 AM 

EST [place] ([facility])









          Name      Value     Range     Interpretation Code Description Data [facility]

rce(s) Supporting 

Document(s)

 

             total 25(oh) vitamin D 24.7 NG/mL   30.0-100.0   Below low normal T

otal 25(Oh) 

Vitamin D                 [place] ([facility])  









                    ID                  Date                Data Source

 

                    [id number] 11/10/2020 08:05:00 AM 

EST [facility])









          Name      Value     Range     Interpretation Code Description Data [facility]

rce(s) Supporting 

Document(s)

 

           cholesterol level 144 mg/dL  <200       normal     Cholesterol Level 

[facility] ([facility])                    

 

           triglycerides level 59 mg/dL   <150       normal     Triglycerides Le

isaac [facility] ([facility])            

 

           cholesterol risk ratio            <5         normal     Cholesterol R

isk Ratio [facility] ([facility])                    

 

           non-HDL-C  92 mg/dL              normal     Non-hdl-c  [facility] ([facility])                                  

 

           HDL cholesterol 52 mg/dL   >40        normal     HDL Cholesterol ATHE

NA ([facility])                           

 

             Cholesterol in LDL [Mass/volume] in Serum or Plasma 80 mg/dL     <1

00         normal       LDL 

Cholesterol               [facility] ([facility])  









                    ID                  Date                Data Source

 

                    [id number] 11/10/2020 08:05:00 AM 

EST [place] ([facility])









          Name      Value     Range     Interpretation Code Description Data [facility]

rce(s) Supporting 

Document(s)

 

           blood urea nitrogen 12 mg/dL   7-18       normal     Blood Urea Nitro

gen [facility] ([facility])            

 

           creatinine for GFR 0.73 mg/dL 0.55-1.30  normal     Creatinine for GF

R [facility] ([facility])            

 

           glomerular filtration rate > 60.0     >58        normal     Glomerula

r Filtration Rate [facility] 

([facility])     

 

           glucose, fasting 102 mg/dL       Above high normal Glucose, Fas

ting [facility] 

([facility])     

 

           chloride level 107 mEq/L       normal     Chloride Level [place]

 ([facility])                    

 

           sodium level 141 mEq/L  136-145    normal     Sodium Level [facility] (No

[facility])                           

 

           carbon dioxide level 28 mEq/L   21-32      normal     Carbon Dioxide 

Level [facility] ([facility])            

 

           potassium serum 4.5 mEq/L  3.5-5.1    normal     Potassium Serum ATHE

 ([facility])                    

 

           ALT/SGPT   16 U/L     12-78      normal     ALT/SGPT   [facility] ([facility])                                  

 

           anion gap  6 mEq/L    8-16       Below low normal Anion Gap  [facility] (

[facility])                           

 

           calcium level 9.0 mg/dL  8.5-10.1   normal     Calcium Level [facility] (

[facility])                    

 

           AST/SGOT   12 U/L     7-37       normal     AST/SGOT   [facility] ([facility])

                                         

 

           albumin    3.6 gm/dL  3.2-5.2    normal     Albumin    [facility] ([facility])                                  

 

           alkaline phosphatase 88 U/L          normal     Alkaline Phosph

atase [facility] ([facility])            

 

           total protein 7.8 gm/dL  6.4-8.2    normal     Total Protein [facility] (

[facility])                    

 

           bilirubin,total 0.3 mg/dL  0.2-1.0    normal     Bilirubin,total ATHE

 ([facility])                    

 

           albumin/globulin ratio            1.2-2.2    Below low normal Albumin

/globulin Ratio [facility] 

([facility])     









                    ID                  Date                Data Source

 

                    [id number] 11/10/2020 08:05:00 AM 

EST [facility] ([facility])









          Name      Value     Range     Interpretation Code Description Data [facility]

rce(s) Supporting 

Document(s)

 

           Hemoglobin A1c/Hemoglobin.total in Blood 5.6 %                 normal

     Hemoglobin a1C [facility] 

([facility])     

 

             estimated average glucose 114 mg/dL           Above high norm

al Estimated Average 

Glucose                   [place] ([facility])  









                    ID                  Date                Data Source

 

                    [id number] 11/10/2020 08:05:00 AM 

EST [facility] ([facility])









          Name      Value     Range     Interpretation Code Description Data [facility]

rce(s) Supporting 

Document(s)

 

           red blood count 4.57 10    4.00-5.40  normal     Red Blood Count ATHE

[facility])                    

 

           white blood count 7.3 10     4.0-10.0   normal     White Blood Count 

[place] ([facility])                   

 

             mean corpuscular hemoglobin 25.4 pg      27.0-33.0    Below low nor

mal Mean Corpuscular 

Hemoglobin                [facility] ([facility])  

 

           hemoglobin 11.6 g/dL  12.0-15.5  Below low normal Hemoglobin [place] (

[facility])                   

 

           mean corpuscular volume 82.9 fL    80.0-96.0  normal     Mean Corpusc

ular Volume [place] 

([facility])     

 

           hematocrit 37.9 %     36.0-47.0  normal     Hematocrit [place] ([facility])                           

 

           red cell distribution width 14.4 %     11.5-14.5  normal     Red Cell

 Distribution Width 

[place] ([facility])  

 

           platelet count, automated 223 10     150-450    normal     Platelet C

ount, Automated [place]

 ([facility])    

 

             mean corpuscular HGB conc 30.6 g/dL    32.0-36.5    Below low naren

l Mean Corpuscular 

HGB Conc                  [place] ([facility])  

 

          eos %     0.7 %     0.0-3.0   normal    Eos %     [facility] ([facility])  

 

           neutrophils % 64.1 %     36.0-66.0  normal     Neutrophils % [place] (

[facility])                          

 

           mono %     6.0 %      0.0-5.0    Above high normal [place] %     [facility] 

([facility])                           

 

           lymph %    28.6 %     24.0-44.0  normal     Lymph %    [facility] ([facility])                                  

 

           nucleated red blood cell % 0.0 %      0-0        normal     Nucleated

 Red Blood Cell % [facility] 

([facility])     

 

          baso %    0.3 %     0.0-1.0   normal    Baso %    [facility] ([facility])  

 

           immature granulocyte % 0.3 %      0-3.0      normal     Immature Gran

ulocyte % [facility] ([facility])            

 

           lymph #    2.1 10     1.5-5.0    normal     Lymph #    [facility] ([facility])                                  

 

           neutrophils # 4.7 10     1.5-8.5    normal     Neutrophils # [facility] (

[facility])                           

 

          eos #     0.1 10    0.0-0.5   normal    Eos #     [facility] ([facility])  

 

          mono #    0.4 10    0.0-0.8   normal    [place] #    [facility] ([facility]) 

 

 

          baso #    0.0 10    0.0-0.2   normal    Baso #    [facility] ([facility]) 

 









                    ID                  Date                Data Source

 

                    [id number] 11/10/2020 08:05:00 AM 

EST [facility] ([facility])









          Name      Value     Range     Interpretation Code Description Data [facility]

rce(s) Supporting 

Document(s)

 

             total 25(oh) vitamin D 24.7 NG/mL   30.0-100.0   Below low normal T

otal 25(Oh) 

Vitamin D                 [place] ([facility])  









                    ID                  Date                Data Source

 

                    [id number] 11/10/2020 08:05:00 AM 

EST [facility] ([facility])









          Name      Value     Range     Interpretation Code Description Data [facility]

rce(s) Supporting 

Document(s)

 

           triglycerides level 59 mg/dL   <150       normal     Triglycerides Le

isaac [facility] ([facility])            

 

           cholesterol level 144 mg/dL  <200       normal     Cholesterol Level 

[facility] ([facility])                    

 

           cholesterol risk ratio            <5         normal     Cholesterol R

isk Ratio [facility] ([facility])                    

 

             Cholesterol in LDL [Mass/volume] in Serum or Plasma 80 mg/dL     <1

00         normal       LDL 

Cholesterol               [facility] ([facility])  

 

           non-HDL-C  92 mg/dL              normal     Non-hdl-c  [facility] ([facility])                                  

 

           HDL cholesterol 52 mg/dL   >40        normal     HDL Cholesterol ATHE

 ([facility])                          









                    ID                  Date                Data Source

 

                    [id number] 11/10/2020 08:05:00 AM 

EST [facility] ([facility])









          Name      Value     Range     Interpretation Code Description Data [facility]

rce(s) Supporting 

Document(s)

 

           creatinine for GFR 0.73 mg/dL 0.55-1.30  normal     Creatinine for GF

R [place] ([facility])            

 

           glucose, fasting 102 mg/dL       Above high normal Glucose, Fas

ting [facility] 

([facility])     

 

           blood urea nitrogen 12 mg/dL   7-18       normal     Blood Urea Nitro

gen [facility] ([facility])            

 

           chloride level 107 mEq/L       normal     Chloride Level [place]

 ([facility])                    

 

           potassium serum 4.5 mEq/L  3.5-5.1    normal     Potassium Serum ATHE

 ([facility])                    

 

           glomerular filtration rate > 60.0     >58        normal     [place]

r Filtration Rate [facility] 

([facility])     

 

           sodium level 141 mEq/L  136-145    normal     Sodium Level [facility] (No

[facility])                           

 

           carbon dioxide level 28 mEq/L   21-32      normal     Carbon Dioxide 

Level [facility] ([facility])            

 

           calcium level 9.0 mg/dL  8.5-10.1   normal     Calcium Level [facility] (

[facility])                    

 

           AST/SGOT   12 U/L     7-37       normal     AST/SGOT   [facility] ([facility])

                                         

 

           ALT/SGPT   16 U/L     12-78      normal     ALT/SGPT   [place] ([facility])                                  

 

           anion gap  6 mEq/L    8-16       Below low normal Anion Gap  [facility] (

[facility])                           

 

           bilirubin,total 0.3 mg/dL  0.2-1.0    normal     Bilirubin,total ATHE

 ([facility])                    

 

           alkaline phosphatase 88 U/L          normal     Alkaline Phosph

atase [facility])            

 

           albumin    3.6 gm/dL  3.2-5.2    normal     Albumin    [facility] ([facility])                                  

 

           total protein 7.8 gm/dL  6.4-8.2    normal     Total Protein [facility] (

[facility])                    

 

           albumin/globulin ratio            1.2-2.2    Below low normal Albumin

/globulin Ratio [facility] 

([facility])     









                    ID                  Date                Data Source

 

                    [id number] 11/10/2020 08:05:00 AM 

EST [facility] ([facility])









          Name      Value     Range     Interpretation Code Description Data [facility]

rce(s) Supporting 

Document(s)

 

           Hemoglobin A1c/Hemoglobin.total in Blood 5.6 %                 normal

     Hemoglobin a1C [place] 

([facility])     

 

             estimated average glucose 114 mg/dL           Above high norm

al Estimated Average 

Glucose                   [place] ([facility])  









                    ID                  Date                Data Source

 

                    [id number] 11/10/2020 08:05:00 AM 

EST [facility] ([facility])









          Name      Value     Range     Interpretation Code Description Data [facility]

rce(s) Supporting 

Document(s)

 

           red blood count 4.57 10    4.00-5.40  normal     Red Blood Count ATHE

 ([facility])                    

 

           white blood count 7.3 10     4.0-10.0   normal     White Blood Count 

[facility] ([facility])                   

 

           mean corpuscular volume 82.9 fL    80.0-96.0  normal     Mean Corpusc

ular Volume [facility] 

([facility])     

 

           hemoglobin 11.6 g/dL  12.0-15.5  Below low normal Hemoglobin [facility] (

[facility])                   

 

           hematocrit 37.9 %     36.0-47.0  normal     Hematocrit [facility] ([facility])                           

 

           red cell distribution width 14.4 %     11.5-14.5  normal     Red Cell

 Distribution Width 

[facility] ([facility])  

 

           platelet count, automated 223 10     150-450    normal     Platelet C

ount, Automated [facility]

 ([facility])    

 

             mean corpuscular hemoglobin 25.4 pg      27.0-33.0    Below low nor

mal Mean Corpuscular 

Hemoglobin                [facility] ([facility])  

 

             mean corpuscular HGB conc 30.6 g/dL    32.0-36.5    Below low naren

l Mean Corpuscular 

HGB Conc                  [facility] ([facility])  

 

           mono %     6.0 %      0.0-5.0    Above high normal [place] %     [facility] 

([facility])                           

 

           lymph %    28.6 %     24.0-44.0  normal     Lymph %    [facility] ([facility])                                  

 

           neutrophils % 64.1 %     36.0-66.0  normal     Neutrophils % [facility] (

[facility])                          

 

          eos %     0.7 %     0.0-3.0   normal    Eos %     [facility] ([facility] [facility])  

 

          baso %    0.3 %     0.0-1.0   normal    Baso %    [place] ([facility])  

 

           nucleated red blood cell % 0.0 %      0-0        normal     Nucleated

 Red Blood Cell % [place] 

([facility])     

 

           immature granulocyte % 0.3 %      0-3.0      normal     Immature Gran

ulocyte % [place] ([facility])            

 

           neutrophils # 4.7 10     1.5-8.5    normal     Neutrophils # DARIUS (

[facility])                           

 

          eos #     0.1 10    0.0-0.5   normal    Eos #     DARIUS ([facility])  

 

           lymph #    2.1 10     1.5-5.0    normal     Lymph #    DARIUS ([facility])                                  

 

          baso #    0.0 10    0.0-0.2   normal    Baso #    DARIUS ([facility]) 

 

 

          mono #    0.4 10    0.0-0.8   normal    [place] #    DARIUS ([facility]) 

 









                    ID                  Date                Data Source

 

                    [id number]            10/11/2020 09:58:00 AM EDT [facility] ([facility])









          Name      Value     Range     Interpretation Code Description Data Kyara

rce(s) Supporting 

Document(s)

 

           Calcium [Mass/volume] in Serum or Plasma 5.0                         

                [facility] ([facility])                       

 

           Potassium [Moles/volume] in Serum or Plasma 4.0                      

                   [facility] ([facility])                       

 

           Chloride [Moles/volume] in Serum or Plasma 99                        

                  [facility] ([facility])                       

 

          Sodium    140                                     [facility] ([facility])  

 

           Carbon dioxide, total [Moles/volume] in Serum or Plasma 27.0         

                               [facility] 

([facility])           

 

          Blood Urea Nitrogen 9         5-21                          [facility] ([facility]

[facility])  

 

          Glucose   185                               [facility] ([facility])  

 

          Creatinine 0.7       0.6-1.5                       [facility] ([facility])  

 

                                        Glomerular filtration rate/1.73 sq M.pre

dicted [Volume Rate/Area] in Serum or 

Plasma by Creatinine-based formula (MDRD) Laboratory test result                

                        [facility] 

([facility])           









                    ID                  Date                Data Source

 

                    [id number]            10/11/2020 09:58:00 AM EDT [facility] ([facility]

[facility])









          Name      Value     Range     Interpretation Code Description Data Kyara

rce(s) Supporting 

Document(s)

 

          White Blood Count 10.4      4.3-10.9                      [facility] ([facility])  

 

          Red Blood Count 4.39      4.70-6.20                     [facility] ([facility])  

 

          Platelets 241       130-400                       [facility]  

 

          Hemoglobin 11.4      13.0-17.0                     [facility] ([facility])  

 

          Hematocrit 36.0      39.0-50.0                     [facility] ([facility])  









                    ID                  Date                Data Source

 

                    [id number]    2020 01:39:19 PM EDT [facility]

 

                                        Measurements & CalculationsHeight:      

       63 inches (5 ft. 3 in.) 160.02 cm

   Weight:             227 pounds      103.18 kg    Body Mass Index (BMI):  
40.36BMI Interpretation:         Morbidly ObeseBody Surface Area (BSA):    
2.04Weight Management Education Done (Nutrition/Physical Activity)Vital 
SignsTemperature:        98.5F                  oral Pulse Rate:         80 
beats/minuteRespiratory Rate:   18 respirations/minuteBlood Pressure:     97/61 
     right arm           sitting         automaticO2 Saturation:  98%         
room airVital Signs performed by: Fredy Gonzales LPN,  2020 1:52 PMInitial
Intake Information From: patientRoom #: 11Infectious Disease / Travel 
ScreeningRecent travel for you or any close contacts? NoHave you had any close 
contact with anyone diagnosed with or under investigation for COVID-19 
(coronavirus)? NoFever? NoRespiratory symptoms: cough, cold, congestion, 
shortness of breath, difficulty breathing? NoLoss of smell? NoLoss of taste? 
NoSmoking, Tobacco, Vaping or Smoke Exposure StatusSmoke Status: former 
smokerTobacco Use: NoDo you vape? NoPassive Smoke Exposure: NoMenstrual 
HistoryLast Menstrual Period (LMP): 2020Any possibility of pregnancy? 
NoComments: tubal Healthcare HistorySince your last office visit...Have you been
admitted to the hospital? NoHave you been to an emergency room (ER) or urgent 
care clinic? NoHave you seen another healthcare  provider? Yes - dermHave you 
seen a dentist? NoIntake performed by:  Fredy Gonzales LPN,  2020 1:44 
PMRate Your HealthIn general, would you say your health is? Very GoodPain 
AssessmentAre you currently having any pain which...    You would like your 
provider to address? Yes    Affects your activity level? YesDepression Screening
- PHQ-2Over the last two weeks, have you...    Had little interest or pleasure 
in doing things? Not at all    Been feeling down, depressed, or hopeless? Not at
all PHQ-2 Score: 0Anxiety Screening - FRANCISCA-2Over the last two weeks, have you 
been...    Feeling nervous, anxious, or on edge? Not at all    Unable to stop or
control worrying? Not at all FRANCISCA-2 Score: 0Food InsecurityWithin the past 
year...Did you worry whether your food would run out before you got money to buy
more? NoWas there a time when the food you bought didn't last and you didn't 
have money to get more? NoPatient Learning & Communication Needs Preferred 
learning style: by experiencePossible barriers: nonePatient's Language used in 
visit: YesLanguage: english Pain AssessmentPain ScaleNumeric Rating Scale: 6 / 
10Location: lower backDuration: chronicFrequency: DailyCharacter/Quality: aching
and throbbingIs the pain radiating? NoScreening, Brief Intervention, & Referral 
to Treatment (SBIRT)Pre-Screening Questions How many times have you have 4 or 
more drinks in a day? 0How many times have you used an illegal drug or used a 
prescription medication for a non-medical reason? 0Performed by: Fredy Gonzales LPN, 
2020 1:45 PMPatient History Medical History:Diabetes - Type II diet 
controlledHeart Arrhythmia during pregnancy-cleared by Dr. Fuentes for 6 months 
after pregnancySurgical History:Rt ankle reconstruction Cholecysectomy 
2009Tubal ligation 2014Family History:Diabetes (Mother)Cancer - 
Breast (Mother)Depression (Mother)Diabetes (Father)Hypertension 
(Father)Myocardial infarction (MI) (Father) i2Zyjpgve loss bilateralAcute memory
lossDiabetes (Brother)Diabetes (Sister)Hypothyroidism (Sister)Brain Aneurysm 
(Maternal Grandmother)Diabetes (Paternal Grandmother)Social/Personal History: 
Chief Complaintlab results RM 11 History of Present Illness (HPI)46 yo female PT
here today for lab results. Pt states she has chronic lower back pain. PT states
she is taking all medications with no side effects or issues. Pt states healthy 
diet and physical activities. Pt states was in a toxic relationship but final ly
out.Pt states now living on her own with her two youngest children. Pt states 
much happier but still some anxiety and depression. Pt requesting referral to 
behavioral health. Pt denies any SI or HI. Pt denies smoking. Pt denies 
alcoholism. Pt denies illicit drug use. HPI performed by: Maryana SIFUENTES,
 2020 2:40 PMTransitions of Care InboundMedication Reconciliation & 
ReviewMedication List was reviewed and/or updated during this visit, including 
review of any over-the-counter medications, herbal therapies, and/or 
supplements.Allergy ReviewAllergy List was reviewed and/or updated during this 
visit.Adult Preventive CareProvider Calculated and Reviewed all Clinical 
Protocols for patient today. Labs/Meds/Other Counseling-Nutrition and Physical 
Activity:BMI Interpretation: Morbidly Obese (2020)  Counseling: Done 
(2020)  Physical Activity: Done (2020)Review of Systems General: 
Denies loss of appetite, chills, dizziness, fatigue, fever, continued fever, 
headache, feeling ill, sweats, night sweats, sleep disturbances, weight loss.  
Eyes: Denies blurring of vision, double vision, irritation, discharge, vision 
loss, eye pain, eye swelling, droopy eyelid, sensitivity to light, redness, 
itching.  Ears/Nose/Throat: Denies earache, ear discharge, ringing in ears, 
decreased hearing, nasal congestion, nosebleeds, runny nose, sore throat, 
hoarseness, difficulty swallowing, dry mouth, tooth pain, bleeding gums, swollen
glands.  Cardiovascular: Denies chest pain, palpitations, feeling faint, trouble
breathing w/exertion, SOB upon lying down, SOB at night, peripheral edema, 
elevated blood pressure, decreased heart rate.  Respiratory: Denies cough, 
difficulty breathing, shortness of breath, excessive sputum, coughing up blood, 
wheezing, chest pain.  Breast: Denies discoloration, tenderness, breast changes,
breast lump, nipple discharge.  Gastrointestinal: Denies nausea, vomiting, 
diarrhea, constipation.  Genitourinary: Denies urinary incontinence, pain with 
urination, burning with urination, urinary frequency, urinary hesitancy, urinary
urgency, urinary urgency at night, incomplete emptying, blood in urine.  
Musculoskeletal: Denies back pain, joint pain, leg pain, joint swelling, body 
aches, muscle aches, muscle cramps, muscle weakness, stiffness, recent injury.  
Neurologic: Denies muscle impairment, weakness, numbness/tingling, seizures, 
slurred speech, feeling faint, tremors, vertigo, paralysis on one side, 
paralysis on both sides.  Psychiatric: Complains of depression, anxiety.  Denies
memory loss, mental disturbance, suicidal ideation, homicidal ideation, 
hallucinations, paranoia, feeling stressed, hearing voices.  Endocrine: Denies 
cold intolerance, heat intolerance, excessive thirst, excessive hunger, 
excessive urination, weight loss, weight gain.  Physical ExamGeneral Appearance:
well nourished, well hydrated, no acute distressEyes, External: conjunctivae and
lids normal, EOMIRespiratory, Auscultation: clear to auscultation bilaterally; 
no rales, rhonchi, or wheezesRespiratory, Effort: no intercostal retractions or 
use of accessory musclesCardiovascular, Auscultation: S1, S2 audible; no murmur,
rub, or gallop; RRRPeripheral Circulation: no clubbing, cyanosis, edema, or 
varicositiesAbdomen: soft, non-tender, no masses, bowel sounds normalGait & 
Station: normalSkin, Inspection: no rashes, lesions, or ulcerationsOrientation: 
oriented to time, place, and personMood & Affect: no depression, anxiety, or 
agitationJudgment & Insight: intactCare Management Plan Transitions of 
CareInboundRate Your HealthIn general, would you say your health is? Very 
GoodAssessment & Plan Problems:Added: Anxiety depression (ICD-300.09) (ICD10-
F41.8)   Assessment:    Instructions: We have made a referral for you today. We 
will contact you to set this up.Assessed:Person consulting for explanation of 
examination or test findings (ICD-V65.8) (IQF54-F41.2)   Assessment:    
Instructions: We have reviewed your lab results with you today.Type 2 diabetes 
mellitus without complications (LFA31-H56.9)   Assessment:    Instructions:  
Last HGA1c was 5.5. This indicates well controlled type two diabetes. Please 
continue lifestyle changes to include healthy diet and physical activities. 
Please try to limit sugars and carbohydrates in your diet.Obesity (ICD-278.00) 
(THR45-Y30.09)   Assessment:    Instructions: Please continue lifestyle changes 
to include include  healthy diet and physical activities. Please try to limit 
sugars, carbohydrates, sodium and fats in your diet.vitamin D deficiency (ICD-
268.9) (OAZ26-H03.9)   Assessment:    Instructions: vitamin D within normal 
levels. may stop taking [place] [address] units and start taking maintenance 
dose of 1000 units daily. Please continue healthy diet and physical activ
ities.Patient Instructions/Care Plan: Person consulting for explanation of 
examination or test findings: We have reviewed your lab results with you 
today.Anxiety depression: We have made a referral for you today. We will contact
you to set this up.Type 2 diabetes mellitus without complications:  Last HGA1c 
was 5.5. This indicates well controlled type two diabetes. Please continue 
lifestyle changes to include healthy diet and physical activities. Please try to
limit sugars and carbohydrates in your diet.Obesity: Please continue lifestyle 
changes to include include  healthy diet and physical activities. Please try to 
limit sugars, carbohydrates, sodium and fats in your diet.vitamin D deficiency: 
vitamin D within normal levels. may stop taking [place] [address] units and 
start taking maintenance dose of 1000 units daily. Please continue healthy diet 
and physical activities.----------Plan developed in collaboration with patient 
and/or familyMedications:VITAMIN D3 1000 UNIT ORAL TABLETBETAMETHASONE 
DIPROPIONATE 0.05 % EXTERNAL OINTMENTCLARITIN 10 MG ORAL TABLETFLONASE ALLERGY 
RELIEF 50 MCG/ACT NASAL SUSPENSIONMedication Changes:Refilled:VITAMIN D3 1000 
UNIT ORAL TABLET-1 po daily beginning in 3 mos after 50,000 unit tablets 
completed Qty: 90[Tablet] Refills: 1     Method: ElectronicRemoved:PREDNISONE 10
 MG ORAL TABLET-take two tablets by mouth twice daily x 5 days, then 1 tablet by
 mouth twice daily  x 5 days. then 1 tablet by mouth daily. Qty: 35[Tablet] 
Refills: 0Changed:From: ORAL VITAMIN D3 80271 UNIT ORAL TABLET Qty: 
[id number] Refills: 90[Tablet] To: VITAMIN D3 1000 UNIT ORAL TABLET-1 po 
daily beginning in 3 mos after 50,000 unit tablets completed Qty: 90[Tablet] 
Refills: 1Allergies:PENICILLIN (Critical)Orders:Mental Health Consult [CPT-
09938] COMP METABOLIC PANEL [CPT-47045] CBC W/DIFF [CPT-24448] HgBA1c [CPT-
87371] LIPID PANEL [CPT-85179] Vitamin D 250H Unspecified [CPT-94934] Adult - 
Ofc Vst, EST, Level IV [CPT-27588] Follow-Up Return to clinic: 3 months for 
follow up Clinical Visit Summary CompletedMedications:VITAMIN D3 1000 UNIT ORAL 
TABLET (CHOLECALCIFEROL) 1 po daily beginning in 3 mos after 50,000 unit tablets
completed  #90[Tablet] x 1    Route:ORAL    Entered and Authorized by:  
Maryana SIFUENTES    Electronically signed by:   Maryana SIFUENTES on 
2020    Method used:    Electronically to             [facility] 
[facility] (retail)            [address] [place] [address]      
      Ph: [phone]            Fax: [phone]    Note to Pharmacy: 
Route: ORAL;     Indications:    VITAMIN D DEFICIENCY    RxID:   [id number]
[id number] PREDNISONE 10 MG ORAL TABLET (PREDNISONE) take two tablets by 
mouth twice daily x 5 days, then 1 tablet by mouth twice daily  x 5 days. then 1
 tablet by mouth daily.  #35[Tablet] x 0    Route:ORAL    Entered by: Fredy Gonzales LPN    Authorized by:  Maryana SIFUENTES    Electronically signed by:   
Maryana SIFUENTES on 2020    Method used:    Electronically to        
     [facility] (retail)            [address]            
[place]  [address]            Ph: [phone]            Fax: [phone]
[phone]    RxID:   [id number] signed by Maryana SIFUENTES 
on 2020 at 4:22 
AM________________________________________________________________________ 









          Name      Value     Range     Interpretation Code Description Data Kyara

rce(s) Supporting 

Document(s)

 

                                                                       









                    ID                  Date                Data Source

 

                    [id number]    2020 10:18:02 AM EDT [facility]

 

                                        Labs In-House Blood TestsDate/Time Colle

cted: 2020 8:00  AMTest      

      Result              Reference Range Normal ValueComments: taken from left 
ac, tolerated well.Lisset Shelton,  2020 10:18 AMAssessment & 
Plan Orders:61989-Hxi Vst-Est Level I [CPT-31190] 86572 - Venipuncture [CPT-
67198] Electronically signed by Maryana SIFUENTES on 08/10/2020 at 8:42 
AM________________________________________________________________________ 









          Name      Value     Range     Interpretation Code Description Data Kyara

rce(s) Supporting 

Document(s)

 

                                                                       









                    ID                  Date                Data Source

 

                          [id number]

[id number] 

2020 08:05:00 AM EDT              [facility]









          Name      Value     Range     Interpretation Code Description Data Kyara

rce(s) Supporting 

Document(s)

 

          HGBA1C    5.5 %               N                   [facility]  









                    ID                  Date                Data Source

 

                          [id number]

[id number] 

2020 08:05:00 AM EDT              [facility]









          Name      Value     Range     Interpretation Code Description Data Kyara

rce(s) Supporting 

Document(s)

 

          BG FASTING 93 mg/dL      N                   [facility]  

 

          T4, FREE  0.98 ng/dL 0.76-1.46 N                   [facility]  

 

          TSH       1.870 microintl units/mL 0.358-3.740 N                   [facility]  

 

          VIT D25 TOT 41.7 ng/mL 30.0-100.0 N                   [facility]  









                    ID                  Date                Data Source

 

                          [id number]

[id number] 

2020 08:05:00 AM EDT              [facility]









          Name      Value     Range     Interpretation Code Description Data Kyara

rce(s) Supporting 

Document(s)

 

          HCT       36.3 %    36.0-47.0 N                   [facility]  

 

          HGB       11.7 g/dL 12.0-15.5 L                   [facility]  

 

          MCH       32.2 G/DL pg 32.0-36.5 N                   [facility]  

 

          MCHC      27.7 PG % 27.0-33.0 N                   [facility]  

 

          PLATELETS 194 10 10*3/mm3 150-450   [facility]  

 

          RBC       4.23 10 10*6/mm3 4.00-5.40 [facility]  

 

          RDW       14.1 %    11.5-14.5 [facility]  

 

          WBC TOTAL 6.2       4.0-10.0  [facility]  









                    ID                  Date                Data Source

 

                          [id number]

[id number] 

2020 12:00:00 AM EDT              [facility]









          Name      Value     Range     Interpretation Code Description Data Kyara

rce(s) Supporting 

Document(s)

 

          HGBA1C    5.5 %                                   [facility]  









                    ID                  Date                Data Source

 

                    [id number]    2020 01:24:39 PM EDT [facility]

 

                                        Measurements & CalculationsHeight:      

       63 inches (5 ft. 3 in.) 160.02 cm

   Weight:             222 pounds 2 oz.    100.97 kg    Body Mass Index (BMI):  
39.49BMI Interpretation:         ObeseBody Surface Area (BSA):    2.02Weight 
Management Education Done (Nutrition/Physical Activity)Vital SignsTemperature:  
     97.0FPulse Rate:         88 beats/minuteRespiratory Rate:   14 respirat
ions/minuteBlood Pressure:     125/84     O2 Saturation:  98%         Vital 
Signs performed by: Magaly Sanchez LPN,  May 14, 2020 1:25 PMVital Signs performed 
by: Magaly Sanchez LPN,  May 14, 2020 1:25 PMInitial Intake Information From: 
patientRoom #: 12Infectious Disease / Travel ScreeningRecent travel for you or 
any close contacts? NoHave you had any close contact with anyone diagnosed with 
or under investigation for COVID-19 (coronavirus)? NoFever? NoRespiratory 
symptoms: cough, cold, congestion, shortness of breath, difficulty breathing? 
NoLoss of smell? NoLoss of taste? NoSmoking, Tobacco, Vaping or Smoke Exposure 
StatusSmoke Status: former smokerTobacco Use: NoDo you vape? NoPassive Smoke 
Exposure: NoMenstrual HistoryLast Menstrual Period (LMP): 2020Any 
possibility of pregnancy? NoComments: TubalHealthcare HistorySince your last 
office visit...Have you been admitted to the hospital? NoHave you been to an 
emergency room (ER) or urgent care clinic? NoHave you seen another healthcare  
provider? NoHave you seen a dentist? NoIntake performed by:  Magaly Sanchez LPN,  
May 14, 2020 1:29 PMRate Your HealthIn general, would you say your health is? 
GoodPain AssessmentAre you currently having any pain which...    You would like 
your provider to address? No    Affects your activity level? NoDepression 
Screening - PHQ-2Over the last two weeks, have you...    Had little interest or 
pleasure in doing things? Not at all    Been feeling down, depressed, or 
hopeless? Not at all PHQ-2 Score: 0Anxiety Screening - FRANCISCA-2Over the last two 
weeks, have you been...    Feeling nervous, anxious, or on edge? Not at all    
Unable to stop or control worrying? Not at all FRANCISCA-2 Score: 0Food 
InsecurityWithin the past year...Did you worry whether your food would run out 
before you got money to buy more? NoWas there a time when the food you bought 
didn't last and you didn't have money to get more? NoPRAPARE Sociodemographic 
Characteristics    Race: White   Ethnicity: Not  or    Preferred 
Language: EnglishFamily and Home   Address:    [address] [place] [address]   What is your housing situation today? I have housing   Are you worried 
about losing your housing? NoMoney and Resources In the past year, have you or 
any family members you live with been unable to get any of the following when it
was really needed?    Denies Insecurity: food, utilities, clothing, , 
phone, legal services, otherWithin the past year did you worry whether your food
would run out before you got money to buy more? NoWithin the past year was there
a time when the food you bought didn't last and you didn't have money to get 
more? NoSocial and Emotional Health   How often do you see or talk to people 
that you care about      and feel close to? More than 5 times a week   How 
stressed are you? A little bitAdditional Optional Domains   Do you feel 
physically and emotionally safe where you live? Yes   In the past year, have you
been afraid of a partner, ex-partner? NoScreening, Brief Intervention, & Referr
al to Treatment (SBIRT)Pre-Screening Questions How many times have you have 4 or
more drinks in a day? 0How many times have you used an illegal drug or used a 
prescription medication for a non-medical reason? 0Performed by: Magaly Sanchez LPN,  May 14, 2020 1:31 PMPatient History Medical History:Diabetes - Type II 
diet controlledHeart Arrhythmia during pregnancy-cleared by Dr. Fuentes for 6 
months after pregnancySurgical History:Rt ankle reconstruction Cholecysectomy 
2009Tubal ligation 2014Family History:Diabetes (Mother)Cancer - 
Breast (Mother)Depression (Mother)Diabetes (Father)Hypertension 
(Father)Myocardial infarction (MI) (Father) k4Rdpiiem loss bilateralAcute memory
lossDiabetes (Brother)Diabetes (Sister)Hypothyroidism (Sister)Brain Aneurysm 
(Maternal Grandmother)Diabetes (Paternal Grandmother)Social/Personal History: 
Chief ComplaintEstablish care room 12 History of Present Illness (HPI)44 YO 
female here to establish care.Pt was recently seen for acute visit. Pt states 
still redness to bilateral shin. pt states has an appointment schedule to see 
Derm in july for skin eruption of elbows and knees. Pt states respiratory 
symptoms improved. Pt states former patient of [facility]. Pt 
states was seen by Eder Shanks NP but he is now retired. pt states was last seen
about 10 months ago. Pt states chronic conditions includes  obesity, gerd and 
type 2 diabetes, Pt states not presently taking any medications.Pt states 
healthy diet and physical activities. HPI performed by: Maryana SIFUENTES,  
May 14, 2020 1:53 PMProblem ReviewProblem List was reviewed and/or updated 
during this visit.Medication Reconciliation & ReviewMedication List was reviewed
and/or updated during this visit, including review of any over-the-counter 
medications, herbal therapies, and/or supplements.Allergy ReviewAllergy List was
reviewed and/or updated during this visit.Adult Preventive CareProvider 
Calculated and Reviewed all Clinical Protocols for patient today. 
Labs/Meds/Other Counseling-Nutrition and Physical Activity:BMI Interpretation: 
Obese (2020)  Counseling: Done (2020)  Physical Activity: Done 
(2020)Review of Systems General: Denies loss of appetite, chills, 
dizziness, fatigue, fever, continued fever, headache, feeling ill, sweats, night
sweats, sleep disturbances, weight loss.  Eyes: Denies blurring of vision, 
double vision, irritation, discharge, vision loss, eye pain, eye swelling, 
droopy eyelid, sensitivity to light, redness, itching.  Ears/Nose/Throat: Denies
earache, ear discharge, ringing in ears, decreased hearing, nasal congestion, 
nosebleeds, runny nose, sore throat, hoarseness, difficulty swallowing, dry 
mouth, tooth pain, bleeding gums, swollen glands.  Cardiovascular: Denies chest 
pain, palpitations, feeling faint, trouble breathing w/exertion, SOB upon lying 
down, SOB at night, peripheral edema, elevated blood pressure, decreased heart 
rate.  Respiratory: Denies cough, difficulty breathing, shortness of breath, 
excessive sputum, coughing up blood, wheezing, chest pain.  Breast: Denies 
discoloration, tenderness, breast changes, breast lump, nipple discharge.  
Gastrointestinal: Denies nausea, vomiting, diarrhea, constipation.  Genitourin
davey: Denies urinary incontinence, pain with urination, burning with urination, 
urinary frequency, urinary hesitancy, urinary urgency, urinary urgency at night,
incomplete emptying, blood in urine, pelvic pain, abnormal vaginal bleeding, 
vaginal discharge.  Musculoskeletal: Denies back pain, joint pain, leg pain, 
other pain-see comments, joint swelling, body aches, muscle aches, muscle 
cramps, muscle weakness, stiffness, recent injury.  Skin: Complains of rash, 
redness, itching.  Denies hives, dryness, nail changes, suspicious lesions, 
athlete's foot, rash on palms, rash on bottom of feet.       bilat 
shinNeurologic: Denies muscle impairment, weakness, numbness/tingling, seizures,
slurred speech, feeling faint, tremors, vertigo, paralysis on one side, 
paralysis on both sides.  Psychiatric: Denies depression, anxiety, memory loss, 
mental disturbance, suicidal ideation, homicidal ideation, hallucinations, 
paranoia, feeling stressed, hearing voices.  Endocrine: Denies cold intolerance,
heat intolerance, excessive thirst, excessive hunger, excessive urination, 
weight loss, weight gain.  Physical ExamGeneral Appearance: well nourished, well
hydrated, no acute distressEyes, External: conjunctivae and lids normal, 
EOMIRespiratory, Auscultation: clear to auscultation bilaterally; no rales, 
rhonchi, or wheezesRespiratory, Effort: no intercostal retractions or use of 
accessory musclesCardiovascular, Auscultation: S1, S2 audible; no murmur, rub, 
or gallop; RRRPeripheral Circulation: no clubbing, cyanosis, edema, or 
varicositiesAbdomen: soft, non-tender, no masses, bowel sounds normalGait & 
Station: normalSkin, Inspection: rash, arms and legs. plaqes to bilat knees and 
bilat elbows. Orientation: oriented to time, place, and personMood & Affect: no 
depression, anxiety, or agitationJudgment &amp; Insight: intactRate Your 
HealthIn general, would you say your health is? GoodAssessment & Plan 
Problems:Added: Atopic dermatitis, unspecified (PZE59-V78.9)   Assessment:    
Instructions: We have sent a prescription to your pharmacy today. Please take 
medication as prescribed. please report any mhor side effects.vitamin D 
deficiency (ICD-268.9) (TZH30-F26.9)   Assessment:    Instructions: script sent 
to pharmacy for you today.Encounter for general adult medical examination with 
abnormal findings (ICD-V70.0) (JCL88-U33.01)   Assessment:    Instructions: We 
have had your annual physical exam done today. Please continue medications as 
prescribed. Please continue lifestyle changes to include healthy diet and 
physical activities. Please try to maintain adequate intake of water 
daily.Person consulting for explanation of examination or test findings (ICD-
V65.8) (PJE45-V41.2)   Assessment:    Instructions: We have reviewed your lab 
results with you today.Assessed:Type 2 diabetes mellitus without complications 
(ZWU57-A20.9)   Assessment:    Instructions: Last HGA1c was 5.9. This indicates 
well controlled type two diabetes. Please continue lifestyle changes to include 
healthy diet and physical activities. Please try to limit sugars and 
carbohydrates in your diet.Obesity (ICD-278.00) (XNI36-U71.09)   Assessment:    
Instructions: Please continue lifestyle changes to include include  healthy diet
and physical activities. Please try to limit sugars, carbohydrates, sodium and 
fats in your diet.Eruption (ICD-782.1) (MGN26-J37)   Assessment:    
Instructions: Please keep your appointment with Derm.Patient Instructions/Care 
Plan: Atopic dermatitis- unspecified: We have sent a prescription to your 
pharmacy today. Please take medication as prescribed. please report any mhor 
side effects.vitamin D deficiency: script sent to pharmacy for you today.Type 2 
diabetes mellitus without complications: Last HGA1c was 5.9. This indicates well
controlled type two diabetes. Please continue lifestyle changes to include 
healthy diet and physical activities. Please try to limit sugars and 
carbohydrates in your diet.Encounter for general adult medical examination with 
abnormal findings: We have had your annual physical exam done today. Please 
continue medications as prescribed. Please continue lifestyle changes to include
healthy diet and physical activities. Please try to maintain adequate intake of 
water daily.Obesity: Please continue lifestyle changes to include include  
healthy diet and physical activities. Please try to limit sugars, carbohydrates,
sodium and fats in your diet.Eruption: Please keep your appointment with 
Derm.Person consulting for explanation of examination or test findings: We have 
reviewed your lab results with you today.----------Plan developed in 
collaboration with patient and/or familyMedications:PREDNISONE 10 MG ORAL 
TABLETVITAMIN D3 15835 UNIT ORAL TABLETBETAMETHASONE DIPROPIONATE 0.05 % 
EXTERNAL OINTMENTCLARITIN 10 MG ORAL TABLETFLONASE ALLERGY RELIEF 50 MCG/ACT 
NASAL SUSPENSIONMedication Changes:New Prescription:VITAMIN D3 39410 UNIT ORAL 
TABLET-1 po q wk for 12 wks, then change to 1000 unit tablet daily thereafter 
Qty: 12[Tablet] Refills: 0     Method: ElectronicPREDNISONE 10 MG ORAL TABLET-
take two tablets by mouth twice daily x 5 days, then 1 tablet by mouth twice 
daily  x 5 days. then 1 tablet by mouth daily. Qty: 35[Tablet] Refills: 0     
Method: ElectronicRemoved:AZITHROMYCIN 250 MG ORAL TABLET-take two tablets by 
mouth on day one, then one tablet daily x 4 days Qty: 6[Tablet] Refills: 
0Allergies:PENICILLIN (Critical)Orders:COMP METABOLIC PANEL [CPT-04309] CBC W/D
IFF [CPT-75586] HgBA1c [CPT-00927] LIPID PANEL [CPT-71990] TSH [CPT-83000] T-4 
free [CPT-10891] Vitamin D 250H Unspecified [CPT-14054] URINALYSIS [CPT-00822] 
Adult - Ofc Vst, EST, Level IV [CPT-55002] Follow-Up Return to clinic: 3 months 
for follow up.  Clinical Visit Summary CompletedMedications:PREDNISONE 10 MG 
ORAL TABLET (PREDNISONE) take two tablets by mouth twice daily x 5 days, then 1 
tablet by mouth twice daily  x 5 days. then 1 tablet by mouth daily.  
#35[Tablet] x 0    Route:ORAL    Entered and Authorized by:  Maryana SIFUENTES    Electronically signed by:   Maryana SIFUENTES on 2020    Method
 used:    Electronically to             [facility] (retail)          
  [address]            [place]  [address]            Ph: [phone]
[phone]            Fax: [phone]    Note to Pharmacy: Route: ORAL;     RxID: 
  [id number] D3 08255 UNIT ORAL TABLET (CHOLECALCIFEROL) 1 po q wk 
for 12 wks, then change to 1000 unit tablet daily thereafter  #12[Tablet] x 0   
Route:ORAL    Entered and Authorized by:  Maryana SIFUENTES    
Electronically signed by:   Maryana SIFUENTES on 2020    Method used: 
  Electronically to             [facility] (retail)            [address]            [place] [address]            Ph: [phone]       
    Fax: [phone]    Note to Pharmacy: Route: ORAL;     Indications:    
VITAMIN D DEFICIENCY    RxID:   [id number] AZITHROMYCIN 250 MG 
ORAL TABLET (AZITHROMYCIN) take two tablets by mouth on day one, then one tablet
daily x 4 days  #6[Tablet] x 0    Route:ORAL    Entered by: Magaly Sanchez LPN    
Authorized by:  Maryana SIFUENTES    Electronically signed by:   Maryana SIFUENTES on 2020    Method used:    Electronically to             
[facility] (retail)            [address]            
[place] [address]            Ph: [phone]            Fax: [phone]
[phone]    RxID:   [id number] signed by Maryana SIFUENTES 
on 2020 at 12:07 
AM________________________________________________________________________ 









          Name      Value     Range     Interpretation Code Description Data Kyara

rce(s) Supporting 

Document(s)

 

                                                                       









                    ID                  Date                Data Source

 

                    [id number] 2020 11:53:00 AM EDT

 [facility]









          Name      Value     Range     Interpretation Code Description Data Kyara

rce(s) Supporting 

Document(s)

 

          HCT       39.3 %    36.0-47.0 N                   [facility]  

 

          HGB       12.8 g/dL 12.0-15.5 N                   [facility]  

 

          MCH       32.6 G/DL pg 32.0-36.5 N                   [facility]  

 

          MCHC      27.9 PG % 27.0-33.0 N                   [facility]  

 

          PLATELETS 236 10 10*3/mm3 150-450   N                   [facility]  

 

          RBC       4.59 10 10*6/mm3 4.00-5.40 N                   [facility]  

 

          RDW       13.8 %    11.5-14.5 N                   [facility]  

 

          WBC TOTAL 9.3       4.0-10.0  N                   [facility]  









                    ID                  Date                Data Source

 

                    [id number] 2020 11:53:00 AM EDT

 [facility]









          Name      Value     Range     Interpretation Code Description Data Kyara

rce(s) Supporting 

Document(s)

 

          BG FASTING 79 mg/dL      N                   [facility]

[facility]  









                    ID                  Date                Data Source

 

                    [id number]    2020 10:26:17 AM EDT [facility]

 

                                        Measurements & CalculationsHeight:      

       63 inches (5 ft. 3 in.) 160.02 cm

    Weight:             220 pounds 3 oz.    100.09 kg    Body Mass Index (BMI): 
 39.15BMI Interpretation:         ObeseBody Surface Area (BSA):    2.02Weight 
Management Education Done (Nutrition/Physical Activity)Vital SignsTemperature:  
      98.2FPulse Rate:         71 beats/minuteRespiratory Rate:   14 respirat
ions/minuteBlood Pressure:     131/87     O2 Saturation:  100%        Vital 
Signs performed by: Magaly Sanchez LPN,  May  4, 2020 10:28 AMVital Signs performed
 by: Magaly Sanchez LPN,  May  4, 2020 10:28 AMMultiple Vital SignsInitial BP: 
146/86Vitals #2BP: 131/87 (primary)Initial Intake Information From: patientRoom 
#: 12Infectious Disease / Travel ScreeningRecent travel for you or any close 
contacts? NoHave you had any close contact with anyone diagnosed with or under 
investigation for COVID-19 (coronavirus)? NoFever? NoRespiratory symptoms: 
cough, cold, congestion, shortness of breath, difficulty breathing? YesLoss of 
smell? NoLoss of taste? NoDetails: cough and cold syptoms , sinus 
congestionSmoking, Tobacco, Vaping or Smoke Exposure StatusSmoke Status: former 
smokerTobacco Use: NoDo you vape? NoPassive Smoke Exposure: NoMenstrual 
HistoryLast Menstrual Period (LMP): 2020Any possibility of pregnancy? 
NoComments: tubal Healthcare HistorySince your last office visit...Have you been
 admitted to the hospital? NoHave you been to an emergency room (ER) or urgent 
care clinic? NoHave you seen another healthcare  provider? NoHave you seen a 
dentist? NoIntake performed by:  Magaly Sanchez LPN,  May  4, 2020 10:31 AMRate 
Your HealthIn general, would you say your health is? GoodPain AssessmentAre you 
currently having any pain which...    You would like your provider to address? 
Yes    Affects your activity level? YesPain AssessmentPain ScaleNumeric Rating 
Scale: 6 / 10Location: headache Character/Quality: aching and throbbing. 
PoundingIs the pain radiating? NoScreening, Brief Intervention, & Referral to 
Treatment (SBIRT)Pre-Screening Questions How many times have you have 4 or more 
drinks in a day? 0How many times have you used an illegal drug or used a 
prescription medication for a non-medical reason? 0Performed by: Magaly Sanchez LPN,  May  4, 2020 10:33 AMPatient History Medical History:Diabetes - Type II 
diet controlledHeart Arrhythmia during pregnancy-cleared by Dr. Fuentes for 6 
months after pregnancySurgical History:Rt ankle reconstruction Cholecysectomy 
2009Tubal ligation 2014Family History:Diabetes (Mother)Cancer - 
Breast (Mother)Depression (Mother)Diabetes (Father)Hypertension 
(Father)Myocardial infarction (MI) (Father) o5Lexzpiv loss bilateralAcute memory
 lossDiabetes (Brother)Diabetes (Sister)Hypothyroidism (Sister)Brain Aneurysm 
(Maternal Grandmother)Diabetes (Paternal Grandmother)Social/Personal History: 
Chief ComplaintAllegies and Rash, not feeling well room 13History of Present 
Illness (HPI)44 YO female here for acute visit , C/O of headache ,sinus, body 
aches , nausea , exhaustion and nasal congession. . Pt is requesting out of work
 note. Pt states no loss of taste or smell. Pt denies feversPt states have been 
resting and trying to Stay hydrated. Pt states itchy rash bilateral legs and 
arms started about one week ago. Pt denies new medications. pt denies diat 
changes. Pt denies any new spaps , perfumes or detergents. Pt is present with 
plaques on elbows and knees. Pt states present for a very long time. Pt denies 
other concerns today. HPI performed by: Maryana SIFUENTES,  May  4, 2020 
11:14 AMProblem ReviewProblem List was reviewed and/or updated during this 
visit.Medication Reconciliation & ReviewMedication List was reviewed and/or 
updated during this visit, including review of any over-the-counter medications,
 herbal therapies, and/or supplements.Allergy ReviewAllergy List was reviewed 
and/or updated during this visit.Adult Preventive CareProvider Calculated and 
Reviewed all Clinical Protocols for patient today. Labs/Meds/Other 
Counseling-Nutrition and Physical Activity:BMI Interpretation: Obese 
(2020)  Counseling: Done (2020)  Physical Activity: Done 
(2020)Review of Systems General: Denies loss of appetite, chills, dizzines
s, fatigue, fever, continued fever, headache, feeling ill, sweats, night sweats,
 sleep disturbances, weight loss.  Eyes: Denies blurring of vision, double 
vision, irritation, discharge, vision loss, eye pain, eye swelling, droopy 
eyelid, sensitivity to light, redness, itching.  Ears/Nose/Throat: Denies 
earache, ear discharge, ringing in ears, decreased hearing, nasal congestion, 
nosebleeds, runny nose, sore throat, hoarseness, difficulty swallowing, dry 
mouth, tooth pain, bleeding gums, swollen glands.  Cardiovascular: Denies chest 
pain, palpitations, feeling faint, trouble breathing w/exertion, SOB upon lying 
down, SOB at night, peripheral edema, elevated blood pressure, decreased heart 
rate.  Respiratory: Denies cough, difficulty breathing, shortness of breath, 
excessive sputum, coughing up blood, wheezing, chest pain.  Gastrointestinal: 
Denies nausea, vomiting, bleeding, burning, itching, irritation, cramps, 
diarrhea, constipation.  Genitourinary: Denies urinary incontinence, pain with 
urination, burning with urination, urinary frequency, urinary hesitancy, urinary
 urgency, urinary urgency at night, incomplete emptying, blood in urine.  
Musculoskeletal: Denies back pain, joint pain, leg pain, other pain-see 
comments, joint swelling, body aches, muscle aches, muscle cramps, muscle 
weakness, stiffness, recent injury.  Skin: Complains of rash, redness, itching, 
dryness.       arms aegs elbows kneesNeurologic: Denies muscle impairment, 
weakness, numbness/tingling, seizures, slurred speech, feeling faint, tremors, 
vertigo, paralysis on one side, paralysis on both sides.  Psychiatric: Denies de
pression, anxiety, memory loss, mental disturbance, suicidal ideation, homicidal
 ideation, hallucinations, paranoia, feeling stressed, hearing voices.  
Endocrine: Denies cold intolerance, heat intolerance, excessive thirst, 
excessive hunger, excessive urination, weight loss, weight gain.  Physical 
ExamGeneral Appearance: well nourished, well hydrated, no acute distressEyes, 
External: conjunctivae and lids normal, EOMINasal: nasal congession. sinus pain 
on palpation. Respiratory, Auscultation: clear to auscultation bilaterally; no 
rales, rhonchi, or wheezesRespiratory, Effort: no intercostal retractions or use
 of accessory musclesCardiovascular, Auscultation: S1, S2 audible; no murmur, 
rub, or gallop; RRRPeripheral Circulation: no clubbing, cyanosis, edema, or 
varicositiesAbdomen: soft, non-tender, no masses, bowel sounds normalGait & 
Station: normalSkin, Inspection: rash, arms and legs. plaqes to bilat knees and 
bilat elbows. Orientation: oriented to time, place, and personMood & Affect: no 
depression, anxiety, or agitationJudgment & Insight: intactRate Your HealthIn 
general, would you say your health is? GoodAssessment & Plan Problems:Added: 
Acute upper respiratory infection, unspecified (YTG55-G04.9)   Assessment:    
Instructions: We have sent a prescription to your pharmacy today. Please take 
medications as prescribed. Please report any major side effects. Please try to 
maintain adequate rest. good nutrition and adequate physical activities. Please 
try to maintain adequate intake of water daily. May take OTC tylenol as needed 
for headaches and body aches.Other seasonal allergic rhinitis (FZU61-L15.2)   
Assessment:    Instructions: We have sent a prescription to your pharmacy 
today.Eruption (ICD-782.1) (OOP45-O34)   Assessment:    Instructions: We have 
sent a prescription to your pharmacy today. Referral made to derm, skin 
plaques.Assessment not Saved Eruption (AZT53-Y62): Patient Instructions/Care 
Plan: Acute upper respiratory infection- unspecified: We have sent a 
prescription to your pharmacy today. Please take medications as prescribed. 
Please report any major side effects. Please try to maintain adequate rest. good
 nutrition and adequate physical activities. Please try to maintain adequate 
intake of water daily. May take OTC tylenol as needed for headaches and body 
aches.Other seasonal allergic rhinitis: We have sent a prescription to your 
pharmacy today.Eruption: We have sent a prescription to your pharmacy today. 
Referral made to derm, skin plaques.----------Plan developed in collaboration 
with patient and/or familyMedications:BETAMETHASONE DIPROPIONATE 0.05 % EXTERNAL
 OINTMENTCLARITIN 10 MG ORAL TABLETFLONASE ALLERGY RELIEF 50 MCG/ACT NASAL 
SUSPENSIONAZITHROMYCIN 250 MG ORAL TABLETMedication Changes:New 
Prescription:AZITHROMYCIN 250 MG ORAL TABLET-take two tablets by mouth on day 
one, then one tablet daily x 4 days Qty: 6[Tablet] Refills: 0     Method: 
ElectronicFLONASE ALLERGY RELIEF 50 MCG/ACT NASAL SUSPENSION-one spray to each 
nostril twice daily as needed Qty: 1[Container] Refills: 0     Method: 
ElectronicCLARITIN 10 MG ORAL TABLET-take one tablet by mouth daily Qty: 
30[Tablet] Refills: 1     Method: ElectronicBETAMETHASONE DIPROPIONATE 0.05 % 
EXTERNAL OINTMENT-apply to affected aniya twice daily as needed. arms and legs 
Qty: 2[Tube] Refills: 1     Method: ElectronicAllergies:PENICILLIN 
(Critical)Orders:Dermatology Consult [CPT-99246] COMP METABOLIC PANEL [CPT-
21447] CBC W/DIFF [CPT-60181] Adult - Ofc Vst, EST, Level IV [CPT-81080] Follow-
Up Return to clinic: as scheduled and as needed Clinical Visit Summary 
CompletedMedications:BETAMETHASONE DIPROPIONATE 0.05 % EXTERNAL OINTMENT 
(BETAMETHASONE DIPROPIONATE) apply to affected aniya twice daily as needed. arms 
and legs  #2[Tube] x 1    Route:EXTERNAL    Entered and Authorized by:  Sd SIFUENTES    Electronically signed by:   Maryana SIFUENTES on 
2020    Method used:    Electronically to             [facility] (retail)            [address]            [place] [address]      
      Ph: [phone]            Fax: [phone]    Note to Pharmacy: 
Route: EXTERNAL;     Indications:    ERUPTION    RxID:   
[id number] 10 MG ORAL TABLET (LORATADINE) take one tablet by mouth
 daily  #30[Tablet] x 1    Route:ORAL    Entered and Authorized by:  Maryana SIFUENTES    Electronically signed by:   Maryana SIFUENTES on 2020  
  Method used:    Electronically to             [facility] (retail)  
          [address] [place] [address]            Ph: [phone]            Fax: [phone]    Note to Pharmacy: Route: ORAL;     
Indications:    OTHER SEASONAL ALLERGIC RHINITIS    RxID:   
[id number] ALLERGY RELIEF 50 MCG/ACT NASAL SUSPENSION (FLUTICASONE 
PROPIONATE) one spray to each nostril twice daily as needed  #1[Container] x 0  
  Route:NASAL    Entered and Authorized by:  Maryana SIFUENTES    
Electronically signed by:   Maryana SIFUENTES on 2020    Method used: 
   Electronically to             [facility] (retail)            [address]
  [address] [place] [address]            Ph: [phone]      
      Fax: [phone]    Note to Pharmacy: Route: NASAL;     Indications:   
 ACUTE UPPER RESPIRATORY INFECTION, UNSPECIFIED;OTHER SEASONAL ALLERGIC RHINITIS
    RxID:   [id number] 250 MG ORAL TABLET (AZITHROMYCIN) take 
two tablets by mouth on day one, then one tablet daily x 4 days  #6[Tablet] x 0 
   Route:ORAL    Entered and Authorized by:  Maryana SIFUENTES    
Electronically signed by:   Maryana SIFUENTES on 2020    Method used: 
   Electronically to             [facility] (retail)            [address]
 [place] [address]            Ph: [phone]      
      Fax: [phone]    Note to Pharmacy: Route: ORAL;     Indications:    
ACUTE UPPER RESPIRATORY INFECTION, UNSPECIFIED    RxID:   
[id number] signed by Maryana SIFUENTES on 2020 at 
1:38 PM________________________________________________________________________ 









          Name      Value     Range     Interpretation Code Description Data Kyara

rce(s) Supporting 

Document(s)

 

                                                                       









                    ID                  Date                Data Source

 

                    [id number]    2020 08:31:07 AM EDT [facility]

 

                                        Labs In-House Blood TestsDate/Time Colle

cted: 2020 8:31 AMTest        

    Result              Reference Range Normal ValueComments: blood draw done in
 offcie done in the right ac tolerated well Gilbert Foreman MA,  2020 
8:31 AMAssessment & Plan Orders:[id number] Vst-Est Level I [CPT-77921] 29061 - 
Venipuncture [CPT-12408] Electronically signed by Wilman Wilson DO on 2020 
at 12:13 
PM________________________________________________________________________ 









          Name      Value     Range     Interpretation Code Description Data Kyara

rce(s) Supporting 

Document(s)

 

                                                                       









                    ID                  Date                Data Source

 

                    [id number] 2020 08:25:00 AM EDT

 [facility]









          Name      Value     Range     Interpretation Code Description Data Kyara

rce(s) Supporting 

Document(s)

 

          HCT       39.6 %    36.0-47.0 N                   [facility]  

 

          HGB       12.9 g/dL 12.0-15.5 N                   [facility]  

 

          MCH       32.6 G/DL pg 32.0-36.5 N                   [facility]  

 

          MCHC      28.0 PG % 27.0-33.0 [facility]  

 

          PLATELETS 221 10 10*3/mm3 150-450   N                   [facility]  

 

          RBC       4.61 10 10*6/mm3 4.00-5.40 [facility]  

 

          RDW       13.9 %    11.5-14.5 [facility]  

 

          WBC TOTAL 7.5       4.0-10.0  N                   [facility]  









                    ID                  Date                Data Source

 

                    [id number] 2020 08:25:00 AM EDT

 [facility]









          Name      Value     Range     Interpretation Code Description Data Kyara

rce(s) Supporting 

Document(s)

 

          HGBA1C    5.9 %               N                   [facility]  









                    ID                  Date                Data Source

 

                    [id number] 2020 08:25:00 AM EDT

 [facility]









          Name      Value     Range     Interpretation Code Description Data Kyara

rce(s) Supporting 

Document(s)

 

          VIT D25 TOT 8.3 ng/mL 30.0-100.0 L                   [facility]

[facility]  

 

          BG FASTING 100 mg/dL     N                   [facility]  

 

          TSH       2.170 microintl units/mL 0.358-3.740 N                   [facility]  









                    ID                  Date                Data Source

 

                    [id number]    2020 08:22:39 AM EDT [facility]

 

                                        Electronically signed by Wilman Wilson DO 

on 2020 at 8:23 

AM________________________________________________________________________ 









          Name      Value     Range     Interpretation Code Description Data Kyara

rce(s) Supporting 

Document(s)

 

                                                                       









                    ID                  Date                Data Source

 

                    [id number]    2020 02:53:57 PM EST [facility]

 

                                        Measurements & CalculationsHeight:      

       63 inches (5 ft. 3 in.) 160.02 cm

   Weight:             213 pounds 6 oz.    96.99 kg    Body Mass Index (BMI):  
37.94BMI Interpretation:         ObeseBody Surface Area (BSA):    1.99Weight 
Management Education Done (Nutrition/Physical Activity)Vital SignsTemperature:  
     98.4F                  oral Pulse Rate:         109 beats/minuteRespirat
ory Rate:   16 respirations/minuteBlood Pressure:     113/80      left arm      
     sitting         automaticO2 Saturation:  97%         room airVital Signs 
performed by: Lisa Francois LPN,  2020 3:07 PMVital Signs 
performed by: Don VARGHESE,  2020 3:18 PMInitial Intake 
Information from: patientRoom #: 9Infectious Disease- Travel Have you or your 
sexual partner travelled outside of the country recently? NoSmoking, Tobacco or 
Smoke Exposure StatusSmoke Status: former smokerTobacco Use: NoPassive Smoke 
Exposure: NoMenstrual HistoryLast Menstrual Period (LMP): 2020Any 
possibility of pregnancy? NoHealthcare HistorySince your last office 
visit...Have you been admitted to the hospital? NoHave you been to an emergency 
room (ER) or urgent care clinic? Yes - [facility]- Lower respiratory 
infectionEmergency room (ER) or urgent care date reported today:  2020Have
you seen another healthcare  provider? NoHave you seen a dentist? NoIntake 
performed by:  Lisa Francois LPN,  2020 2:58 PMRate Your HealthIn 
general, would you say your health is? Very GoodPain AssessmentAre you currently
having any pain which...    You would like your provider to address? No    
Affects your activity level? NoDepression Screening - PHQ-2Over the last two 
weeks, have you...    Had little interest or pleasure in doing things? Not at 
all    Been feeling down, depressed, or hopeless? Not at all PHQ-2 Score: 
0Anxiety Screening - FRANCISCA-2Over the last two weeks, have you been...    Feeling 
nervous, anxious, or on edge? Not at all    Unable to stop or control worrying? 
Not at all FRANCISCA-2 Score: 0Infectious Disease- Travel Cont. Any possibility of 
pregnancy? NoScreening, Brief Intervention, & Referral to Treatment (SBIRT)Pre-
Screening Questions How many times have you have 4 or more drinks in a day? 0How
many times have you used an illegal drug or used a prescription medication for a
non-medical reason? 0Performed by: Lisa Francois LPN,  2020 2:59 
PMPatient History Medical History:Diabetes - Type II diet controlledHeart 
Arrhythmia during pregnancy-cleared by Dr. Fuentes for 6 months after 
pregnancySurgical History:Rt ankle reconstruction Cholecysectomy 2009Tubal
ligation 2014Family History:Diabetes (Mother)Cancer - Breast 
(Mother)Depression (Mother)Diabetes (Father)Hypertension (Father)Myocardial 
infarction (MI) (Father) z7Veyidml loss bilateralAcute memory lossDiabetes 
(Brother)Diabetes (Sister)Hypothyroidism (Sister)Brain Aneurysm (Maternal 
Grandmother)Diabetes (Paternal Grandmother)Social/Personal History: Smoking 
Status: former smokerChief ComplaintNew acute respiratory infection History of 
Present Illness (HPI)Pt is a 46 y/o male, presents for new patient acute.  
Previous PCP was Eder Shanks.  Pt was seen at a local urgent care, had a 
negative chest xray, was diagnosed with lower respiratory infection and advised 
supportive measures.  Pt feels symptoms are nearly resolved at this time.  Is 
not taking any medications.  HPI performed by: Don VARGHESE,  2020
3:18 PMTransitions of Care InboundProblem ReviewProblem List was reviewed and/or
updated during this visit.Medication Reconciliation & ReviewMedication List was 
reviewed and/or updated during this visit, including review of any 
over-the-counter medications, herbal therapies, and/or supplements.     Patient 
has no known medications.Allergy ReviewAllergy List was reviewed and/or updated 
during this visit.Adult Preventive CareProvider Calculated and Reviewed all 
Clinical Protocols for patient today. Labs/Meds/Other Counseling-Nutrition and 
Physical Activity:BMI Interpretation: Obese (2020)  Counseling: Done 
(2020)  Physical Activity: Done (2020)Cancer Screening  Pap 
Smear/HPV TestingReviewed: Today's Comments: 2019 @ Woman to woman Review of 
Systems General: Denies chills, dizziness, fatigue, fever, headache, feeling 
ill, sweats.  Ears/Nose/Throat: Denies earache, nasal congestion, sore throat, 
swollen glands.  Cardiovascular: Denies chest pain, palpitations, feeling faint.
 Respiratory: Denies cough, shortness of breath, excessive sputum, coughing up 
blood, wheezing.  Gastrointestinal: Denies nausea, vomiting, diarrhea, constipat
ion, pain or discomfort.  Neurologic: Denies weakness, feeling faint.  
Psychiatric: Denies depression, anxiety.  Physical ExamGeneral Appearance: well 
nourished, well hydrated, no acute distressEyes, External: conjunctivae and lids
normal, EOMIExternal Ears: normal, no lesions or deformitiesHearing: grossly 
intactOtoscopy: canals clear, tympanic membranes intact, no fluid, light reflex 
intact bilaterallyExternal Nose: normal, no lesions or deformitiesNasal: mucosa,
septum, and turbinates normal, nares patentLips/Teeth/Gums: normal dentition, no
gingival inflammation, no labial lesionsPharynx: tongue normal, posterior 
pharynx without erythema or exudate, no thrush/aphthous ulcerNeck: supple, no 
masses, trachea midline, full range of motion of neckRespiratory, Auscultation: 
clear to auscultation bilaterally; no rales, rhonchi, or wheezesRespiratory, 
Effort: no intercostal retractions or use of accessory musclesCardiovascular, 
Auscultation: S1, S2 audible; no murmur, rub, or gallop; RRRPeripheral C
irculation: no clubbing, cyanosis, edema, or varicositiesAbdomen: soft, non-
tender, no masses, bowel sounds normalGait & Station: normalSkin, Inspection: no
rashes, lesions, or ulcerationsOrientation: oriented to time, place, and 
personMood & Affect: no depression, anxiety, or agitationJudgment & Insight: 
intactCare Management Plan Transitions of CareInboundRate Your HealthIn general,
would you say your health is? Very GoodAssessment & Plan Problems:Added: Viral 
lower respiratory infection (ICD-519.8) (FSF77-L98.9)   Assessment:    
Instructions: Resolving.  Supportive measures. This may continue for a total of 
7-10 days typically.  Recommend supportive measures, increase rest, plenty of 
fluids.  May use Zyrtec or Mucinex as needed.Type 2 diabetes mellitus without 
complications (PRR60-T70.9)   Assessment:    Instructions: Return for annual PE.
 Please complete a release of information form to obtain medical records from 
your prior providers(s).Obesity (ICD-278.00) (SJH61-D95.09)   Assessment:    
Instructions: As above.BMI 37.0-37.9 (ICD-V85.37) (JUI85-D83.37)   Assessment:  
 Instructions: As above.Patient Instructions/Care Plan: Viral lower respiratory 
infection: Resolving.  Supportive measures. This may continue for a total of 7-
10 days typically.  Recommend supportive measures, increase rest, plenty of 
fluids.  May use Zyrtec or Mucinex as needed.Type 2 diabetes mellitus without 
complications: Return for annual PE.  Please complete a release of information 
form to obtain medical records from your prior providers(s).Obesity: As 
above.BMI 37.0-37.9: As above.----------Plan developed in collaboration with 
patient and/or familyAllergies:PENICILLIN (Critical)Orders:Adult - Ofc Vst, NEW,
Level III [CPT-23160] Follow-Up Return to clinic: as needed for preventive care 
visitAdditional Follow-Up: annual PEClinical Visit Summary 
CompletedElectronically signed by Don VARGHESE on 2020 at 1:08 
PM________________________________________________________________________ 









          Name      Value     Range     Interpretation Code Description Data Kyara

rce(s) Supporting 

Document(s)

 

                                                                       







                                        Procedure

 

                                          



                                                                                
                                                                                
                                                                                
                                                                                
                                            



Social History

          



           Code       Duration   Value      Status     Description Data Source(s

)

 

             Smoking      2020 12:00:00 AM EST Patient is a former smoker 

completed    Patient 

is a former smoker                      [facility] ([facility])



                                                                                
                 



Vital Signs

          



                    ID                  Date                Data Source

 

                    UNK                                      









           Name       Value      Range      Interpretation Code Description Data

 Source(s)

 

           Body mass index (BMI) [Ratio] 40.2 kg/m2                       40.2 k

g/m2 [facility] ([facility])

 

           Body height 63 [in_i]                        63 [in_i]  [facility] ([facility])

 

                                        5'3" 

 

           Body weight 227.00 [lb_av]                       227.00 [lb_av] MEDEN

T ([facility])

 

           Diastolic blood pressure--sitting 90 mm[Hg]                        90

 mm[Hg]  [facility] ([facility])

 

                                        Omron, large cuff/Ra 

 

           Systolic blood pressure--sitting 128 mm[Hg]                       128

 mm[Hg] [facility] ([facility])

 

                                        Omron, large cuff/Ra 

 

           Heart rate 83 /min                          83 /min    [facility] ([facility])

 

           Diastolic blood pressure--sitting 98 mm[Hg]                        98

 mm[Hg]  [facility] ([facility])

 

                                        Omron, large cuff/Ra 

 

           Systolic blood pressure--sitting 123 mm[Hg]                       123

 mm[Hg] [facility] ([facility])

 

                                        Omron, large cuff/Ra 

 

           Heart rate 84 /min                          84 /min    [facility] ([facility])

 

           Body mass index (BMI) [Ratio] 39.7 kg/m2                       39.7 k

g/m2 [facility] ([facility])

 

           Body height 63 [in_i]                        63 [in_i]  JAQUELINE ([facility]

[facility])

 

                                        5'3" 

 

           Body weight 224.00 [lb_av]                       224.00 [lb_av] SHALONDA

T ([facility])

 

           Body weight 3632 [oz_av]                       3632 [oz_av] [facility]US ([facility])

 

           Systolic blood pressure 97 mm[Hg]                        97 mm[Hg]  A

[facility] ([facility])

 

           Body height 63 [in_i]                        63 [in_i]  [facility] ([facility])

 

           Diastolic blood pressure 61 mm[Hg]                        61 mm[Hg]  

[facility] ([facility])

 

           Body weight 3632 [oz_av]                       3632 [oz_av] [facility] ([facility])

 

           Systolic blood pressure 97 mm[Hg]                        97 mm[Hg]  [facility]

 

           Body height 63 [in_i]                        63 [in_i]  [facility] ([facility])

 

           Diastolic blood pressure 61 mm[Hg]                        61 mm[Hg]  

[facility] ([facility])

 

           Body weight 3632 [oz_av]                       3632 [oz_av] [facility] ([facility])

 

           Systolic blood pressure 97 mm[Hg]                        97 mm[Hg]  A

[facility] ([facility])

 

           Body height 63 [in_i]                        63 [in_i]  [facility] ([facility])

 

           Diastolic blood pressure 61 mm[Hg]                        61 mm[Hg]  

[facility] ([facility])

 

           Body weight 3554.08 [oz_av]                       3554.08 [oz_av] ATH

KATIE ([facility])

 

           Systolic blood pressure 125 mm[Hg]                       125 mm[Hg] A

[facility]

 

           Body height 63 [in_i]                        63 [in_i]  [facility] ([facility])

 

           Diastolic blood pressure 84 mm[Hg]                        84 mm[Hg]  

[facility] ([facility])

 

           Body weight 3554.08 [oz_av]                       3554.08 [oz_av] ATH

KATIE ([facility])

 

           Systolic blood pressure 125 mm[Hg]                       125 mm[Hg] A

[facility]

 

           Body height 63 [in_i]                        63 [in_i]  [facility] ([facility])

 

           Diastolic blood pressure 84 mm[Hg]                        84 mm[Hg]  

[facility] ([facility])

 

           Body weight 3554.08 [oz_av]                       3554.08 [oz_av] ATH

K[facility] ([facility])

 

           Systolic blood pressure 125 mm[Hg]                       125 mm[Hg] A

[facility]

 

           Body height 63 [in_i]                        63 [in_i]  [facility] ([facility])

 

           Diastolic blood pressure 84 mm[Hg]                        84 mm[Hg]  

[facility] ([facility])

 

           Body weight 3523.04 [oz_av]                       3523.04 [oz_av] ATH

K[facility] ([facility])

 

           Systolic blood pressure 131 mm[Hg]                       131 mm[Hg] A

[facility]

 

           Body height 63 [in_i]                        63 [in_i]  [facility] ([facility])

 

           Diastolic blood pressure 87 mm[Hg]                        87 mm[Hg]  

[facility] ([facility])

 

           Body weight 3523.04 [oz_av]                       3523.04 [oz_av] ATH

K[facility] ([facility])

 

           Systolic blood pressure 131 mm[Hg]                       131 mm[Hg] A

[facility]

 

           Body height 63 [in_i]                        63 [in_i]  [facility] ([facility])

 

           Diastolic blood pressure 87 mm[Hg]                        87 mm[Hg]  

[facility] ([facility])

 

           Body weight 3523.04 [oz_av]                       3523.04 [oz_av] ATH

K[facility] ([facility])

 

           Systolic blood pressure 131 mm[Hg]                       131 mm[Hg] A

[facility]

 

           Body height 63 [in_i]                        63 [in_i]  [facility] ([facility])

 

           Diastolic blood pressure 87 mm[Hg]                        87 mm[Hg]  

[facility] ([facility])

 

           Body weight 3414.08 [oz_av]                       3414.08 [oz_av] ATH

[facility] ([facility])

 

           Systolic blood pressure 113 mm[Hg]                       113 mm[Hg] A

[facility] ([facility])

 

           Body height 63 [in_i]                        63 [in_i]  [facility] ([facility])

 

           Diastolic blood pressure 80 mm[Hg]                        80 mm[Hg]  

[facility] ([facility])

 

           Body weight 3414.08 [oz_av]                       3414.08 [oz_av] ATH

[facility] ([facility])

 

           Systolic blood pressure 113 mm[Hg]                       113 mm[Hg] A

[facility] ([facility])

 

           Body height 63 [in_i]                        63 [in_i]  [facility] ([facility])

 

           Diastolic blood pressure 80 mm[Hg]                        80 mm[Hg]  

[facility] ([facility])

 

           Body weight 3414.08 [oz_av]                       3414.08 [oz_av] ATH

[facility] ([facility])

 

           Systolic blood pressure 113 mm[Hg]                       113 mm[Hg] A

[facility] ([facility])

 

           Body height 63 [in_i]                        63 [in_i]  [facility] ([facility])

 

           Diastolic blood pressure 80 mm[Hg]                        80 mm[Hg]  

[facility] ([facility])

## 2021-02-01 NOTE — CCD
Summarization Of Episode

                             Created on: 2021



BEVERLEY LAYTON RICCARDO

External Reference #: [id number]

: 1975

Sex: Female



Demographics





                          Address                   [address]

[place]  [address]

 

                          Home Phone                [phone]

 

                          Preferred Language        English

 

                          Marital Status            Single

 

                          Advent Affiliation     NONE

 

                          Race                      White

 

                          Ethnic Group              Not  or 





Author





                          Author                    [facility]

 

                          Organization              [facility]

 

                          Address                   Unknown

 

                          Phone                     Unavailable







Support





                Name            Relationship    Address         Phone

 

                STEWART LOWERY Next Of Kin     Unknown         Unavailable

 

                    Maryana Vega Next Of Kin         [address] [id number]                    [phone]

 

                     [employer]     Next Of Kin         [address] [address]  [address]                    [phone]

 

                    PRIVATE CARE        Next Of [facility]         -

[address] [id number]                    -

 

                    [place] [address]                    [phone]

 

                    EDWARDO SAGASTUME      Next Of [facility]         [address]

[place] [address]                    [phone]

 

                    [place] [address]                    [phone]

 

                    [facility] Next Of [facility]         [address] [place]  [address]                    [phone]

 

                    SSV*                Next Of Kin         [address] [place] [address]                    [phone]

 

                    STUDENT             Next Of Kin         -

                          -, -  -                   [phone]

 

                    [place] [address]                    [phone]

 

                    [place] [address]                    [phone]

 

                    [place] [address]                    Unavailable







Care Team Providers





                    Care Team Member Name Role                Phone

 

                    JOSE ALFREDO TAMEZ MD Unavailable         Unavailable

 

                    ANTECOLJOSE ALFREDO MD Unavailable         Unavailable

 

                    JOSE ALFREDO TAMEZ MD Unavailable         Unavailable

 

                    ANTECJOSE ALFREDO MANRIQUE MD Unavailable         Unavailable

 

                    ANTECOLJOSE ALFREDO MD Unavailable         Unavailable

 

                    ANTECOLJOSE ALFREDO MD Unavailable         Unavailable

 

                    ANTECOLJOSE ALFREDO MD Unavailable         Unavailable

 

                    ANTECOLJOSE ALFREDO MD Unavailable         Unavailable

 

                    ANTECOL, JOSE ALFREDO HOWE MD Unavailable         Unavailable

 

                    ANTECOL, JOSE ALFREDO HOWE MD Unavailable         Unavailable

 

                    ANTECOL, JOSE ALFREDO HOWE MD Unavailable         Unavailable

 

                    ANTECOL, JOSE ALFREDO HOWE MD Unavailable         Unavailable

 

                    ANTECOL, JOSE ALFREDO HOWE MD Unavailable         Unavailable

 

                    ANTECOL, JOSE ALFREDO HOWE MD Unavailable         Unavailable

 

                    ANTECOL, JOSE ALFREDO HOWE MD Unavailable         Unavailable

 

                    ANTECOL, JOSE ALFREDO HOWE MD Unavailable         Unavailable

 

                    ANTECOL, JOSE ALFREDO HOWE MD Unavailable         Unavailable

 

                    ANTECOL, JOSE ALFREDO HOWE MD Unavailable         Unavailable

 

                    ANTECOL, JOSE ALFREDO HOWE MD Unavailable         Unavailable

 

                    ANTECOL, JOSE ALFREDO HOWE MD Unavailable         Unavailable

 

                    ANTECOL, JOSE ALFREDO HOWE MD Unavailable         Unavailable

 

                    ANTECOL, JOSE ALFREDO HOWE MD Unavailable         Unavailable

 

                    ANTECOL, JOSE ALFREDO HOWE MD Unavailable         Unavailable

 

                    ANTECOL, JOSE ALFREDO HOWE MD Unavailable         Unavailable

 

                    ANTECOL, JOSE ALFREDO HOWE MD Unavailable         Unavailable

 

                    ANTECOL, JOSE ALFREDO HOWE MD Unavailable         Unavailable

 

                    ANTECOL, JOSE ALFREDO HOWE MD Unavailable         Unavailable

 

                    ANTECOL, JOSE ALFREDO HOWE MD Unavailable         Unavailable

 

                    ANTECOL, JOSE ALFREDO HOWE MD Unavailable         Unavailable

 

                    ANTECOL, JOSE ALFREDO HOWE MD Unavailable         Unavailable

 

                    ANTECOL, JOSE ALFREDO HOWE MD Unavailable         Unavailable

 

                    ANTECOL, JOSE ALFREDO HOWE MD Unavailable         Unavailable

 

                    ANTECOL, JOSE ALFREDO HOWE MD Unavailable         Unavailable

 

                    ANTECOL, JOSE ALFREDO HOWE MD Unavailable         Unavailable

 

                    ANTECOL, JOSE ALFREDO HOWE MD Unavailable         Unavailable

 

                    ANTECOL, JOSE ALFREDO HOWE MD Unavailable         Unavailable

 

                    ANTECOL, JOSE ALFREDO HOWE MD Unavailable         Unavailable

 

                    ANTECOL, JOSE ALFREDO HOWE MD Unavailable         Unavailable

 

                    ANTECOL, JOSE ALFREDO HOWE MD Unavailable         Unavailable

 

                    ANTECOL, JOSE ALFREDO HOWE MD Unavailable         Unavailable

 

                    ANTECOL, JOSE ALFREDO HOWE MD Unavailable         Unavailable

 

                    ANTECOL, JOSE ALFREDO HOWE MD Unavailable         Unavailable

 

                    ANTECOL, JOSE ALFREDO HOWE MD Unavailable         Unavailable

 

                    ANTECOL, JOSE ALFREDO HOWE MD Unavailable         Unavailable

 

                    ANTECOL, JOSE ALFREDO HOWE MD Unavailable         Unavailable

 

                    ANTECOL, JOSE ALFREDO HOWE MD Unavailable         Unavailable

 

                    ANTECOL, JOSE ALFREDO HOWE MD Unavailable         Unavailable

 

                    ANTECOL, JOSE ALFREDO HOWE MD Unavailable         Unavailable

 

                    ANTECOL, JOSE ALFREDO HOWE MD Unavailable         Unavailable

 

                    ANTECOL, JOSE ALFREDO HOWE MD Unavailable         Unavailable

 

                    ANTECOL, JOSE ALFREDO HOWE MD Unavailable         Unavailable

 

                    ANTECOL, JOSE ALFREDO HOWE MD Unavailable         Unavailable

 

                    ANTECOL, JOSE ALFREDO HOWE MD Unavailable         Unavailable

 

                    ANTECOL, JOSE ALFREDO HOWE MD Unavailable         Unavailable

 

                    ANTECOL, JOSE ALFREDO HOWE MD Unavailable         Unavailable

 

                    Arian,  Lakeisha DO Unavailable         Unavailable

 

                    Arian,  Lakeisha DO Unavailable         Unavailable

 

                    Arian,  Lakeisha DO Unavailable         Unavailable

 

                    Arian,  Lakeisha DO Unavailable         Unavailable

 

                    Arian,  Lakeisha DO Unavailable         Unavailable

 

                    Arian,  Lakeisha DO Unavailable         Unavailable

 

                    Arian,  Lakeisha DO Unavailable         Unavailable

 

                    Arian,  Lakeisha DO Unavailable         Unavailable

 

                    Arian,  Lakeisha DO Unavailable         Unavailable

 

                    Arian,  Lakeisha DO Unavailable         Unavailable

 

                    Arian,  Lakeisha DO Unavailable         Unavailable

 

                    Arian,  Lakeisha DO Unavailable         Unavailable

 

                    Arian,  Lakeisha DO Unavailable         Unavailable

 

                    Arian,  Lakeisha DO Unavailable         Unavailable

 

                    Arian,  Lakeisha DO Unavailable         Unavailable

 

                    Arian,  Lakeisha DO Unavailable         Unavailable

 

                    Arian,  Lakeisha DO Unavailable         Unavailable

 

                    Arian,  Lakeisha DO Unavailable         Unavailable

 

                    Arian,  Lakeisha DO Unavailable         Unavailable

 

                    Arian,  Lakeisha DO Unavailable         Unavailable

 

                    Arian,  Lakeisha DO Unavailable         Unavailable

 

                    Arian,  Lakeisha DO Unavailable         Unavailable

 

                    Arian,  Lakeisha DO Unavailable         Unavailable

 

                    Arian,  Lakeisha DO Unavailable         Unavailable

 

                    Arian,  Lakeisha DO Unavailable         Unavailable

 

                    Arian,  Lakeisha DO Unavailable         Unavailable

 

                    Arian,  Lakeisha DO Unavailable         Unavailable

 

                    Arian,  Lakeisha DO Unavailable         Unavailable

 

                    Arian,  Lakeisha DO Unavailable         Unavailable

 

                    Arian,  Lakeisha DO Unavailable         Unavailable

 

                    Arian,  Lakeisha DO Unavailable         Unavailable

 

                    Arian,  Lakeisha DO Unavailable         Unavailable

 

                    Arian,  Lakeisha DO Unavailable         Unavailable

 

                    Arian,  Lakeisha DO Unavailable         Unavailable

 

                    Arian,  Lakeisha DO Unavailable         Unavailable

 

                    Arian,  Lakeisha DO Unavailable         Unavailable

 

                    Arian,  Lakeisha DO Unavailable         Unavailable

 

                    Arian,  Lakeisha DO Unavailable         Unavailable

 

                    Arian,  Lakeisha DO Unavailable         Unavailable

 

                    Arian,  Lakeisha DO Unavailable         Unavailable

 

                    Arian,  Lakeisha DO Unavailable         Unavailable

 

                    Arian,  Lakeisha DO Unavailable         Unavailable

 

                    Arian,  Lakeisha DO Unavailable         Unavailable

 

                    Arian,  Lakeisha DO Unavailable         Unavailable

 

                    Arian,  Lakeisha DO Unavailable         Unavailable

 

                    Arian,  Lakeisha DO Unavailable         Unavailable

 

                    Arian,  Lakeisha DO Unavailable         Unavailable

 

                    Arian,  Lakeisha DO Unavailable         Unavailable

 

                    Arian,  Lakeisha DO Unavailable         Unavailable

 

                    Arian,  Lakeisha DO Unavailable         Unavailable

 

                    [place], A Maryana FNP Unavailable         Unavailable

 

                    [place], A Maryana FNP Unavailable         Unavailable

 

                    [place], A Maryana FNP Unavailable         Unavailable

 

                    [place], A Maryana FNP Unavailable         Unavailable

 

                    [place], A Maryana FNP Unavailable         Unavailable

 

                    [place], A Maryana FNP Unavailable         Unavailable

 

                    [place], A Maryana FNP Unavailable         Unavailable

 

                    [place], A Maryana FNP Unavailable         Unavailable

 

                    [place], A Maryana FNP Unavailable         Unavailable

 

                    [place], A Maryana FNP Unavailable         Unavailable

 

                    [place], A Maryana FNP Unavailable         Unavailable

 

                    [place], A Maryana FNP Unavailable         Unavailable

 

                    [place], A Maryana FNP Unavailable         Unavailable

 

                    [place], A Maryana FNP Unavailable         Unavailable

 

                    [place], A Maryana FNP Unavailable         Unavailable

 

                    [place], A Maryana FNP Unavailable         Unavailable

 

                    [place], A Maryana FNP Unavailable         Unavailable

 

                    [place], A Maryana FNP Unavailable         Unavailable

 

                    [place], A Maryana FNP Unavailable         Unavailable

 

                    [place], A Maryana FNP Unavailable         Unavailable

 

                    [place], A Maryana FNP Unavailable         Unavailable

 

                    [place], A Maryana FNP Unavailable         Unavailable

 

                    [place], A Maryana FNP Unavailable         Unavailable

 

                    [place], A Maryana FNP Unavailable         Unavailable

 

                    [place], A Maryana FNP Unavailable         Unavailable

 

                    [place], A Maryana FNP Unavailable         Unavailable

 

                    [place], A Maryana FNP Unavailable         Unavailable

 

                    [place], A Maryana FNP Unavailable         Unavailable

 

                    Katarzyna Escalante Unavailable         [phone]

 

                    Katarzyna Escalante Unavailable         [phone]

 

                    Ali, Mohsin MD     Unavailable         Unavailable

 

                    Ali, Mohsin MD     Unavailable         Unavailable

 

                    Ali, Mohsin MD     Unavailable         Unavailable

 

                    Ali, Mohsin MD     Unavailable         Unavailable

 

                    Ali, Mohsin MD     Unavailable         Unavailable

 

                    Ali,  Mohsin MD     Unavailable         Unavailable

 

                    Ali,  Mohsin MD     Unavailable         Unavailable

 

                    Ali,  Mohsin MD     Unavailable         Unavailable

 

                    Ali,  Mohsin MD     Unavailable         Unavailable

 

                    Ali,  Mohsin MD     Unavailable         Unavailable

 

                    Ali,  Mohsin MD     Unavailable         Unavailable

 

                    Ali,  Mohsin MD     Unavailable         Unavailable

 

                    Ali,  Mohsin MD     Unavailable         Unavailable

 

                    Ali,  Mohsin MD     Unavailable         Unavailable

 

                    Ali,  Mohsin MD     Unavailable         Unavailable

 

                    Ali,  Mohsin MD     Unavailable         Unavailable

 

                    Ali,  Mohsin MD     Unavailable         Unavailable

 

                    Ali, Mohsin MD     Unavailable         Unavailable

 

                    Ali, Mohsin MD     Unavailable         Unavailable

 

                    Ali, Mohsin MD     Unavailable         Unavailable

 

                    Ali, Mohsin MD     Unavailable         Unavailable

 

                    Ali,  Mohsin MD     Unavailable         Unavailable

 

                    Ali,  Mohsin MD     Unavailable         Unavailable

 

                    Ali,  Mohsin MD     Unavailable         Unavailable

 

                    Ali,  Mohsin MD     Unavailable         Unavailable

 

                    Ali,  Mohsin MD     Unavailable         Unavailable

 

                    Ali,  Mohsin MD     Unavailable         Unavailable

 

                    Ali,  Mohsin MD     Unavailable         Unavailable

 

                    Ali,  Mohsin MD     Unavailable         Unavailable

 

                    Ali,  Mohsin MD     Unavailable         Unavailable

 

                    Ali,  Mohsin MD     Unavailable         Unavailable

 

                    Ali,  Mohsin MD     Unavailable         Unavailable

 

                    Ali,  Mohsin MD     Unavailable         Unavailable

 

                    Ali, Mohsin MD     Unavailable         Unavailable

 

                    Ali, Mohsin MD     Unavailable         Unavailable

 

                    Ali, Mohsin MD     Unavailable         Unavailable

 

                    Ali, Mohsin MD     Unavailable         Unavailable

 

                    Ali, Mohsin MD     Unavailable         Unavailable

 

                    Ali, Mohsin MD     Unavailable         Unavailable

 

                    Ali, Mohsin MD     Unavailable         Unavailable

 

                    Ali,  Mohsin MD     Unavailable         Unavailable

 

                    Ali,  Mohsin MD     Unavailable         Unavailable

 

                    Ali, Mohsin MD     Unavailable         Unavailable

 

                    Ali, Mohsin MD     Unavailable         Unavailable

 

                    Ali, Mohsin MD     Unavailable         Unavailable

 

                    Ali, Mohsin MD     Unavailable         Unavailable

 

                    Ali, Mohsin MD     Unavailable         Unavailable

 

                    Ali, Mohsin MD     Unavailable         Unavailable

 

                    Ali, Mohsin MD     Unavailable         Unavailable

 

                    K[place]by, Maryana FNP FNP Unavailable         Unavailable

 

                    [place], A Maryana FNP Unavailable         Unavailable

 

                    [place], A Maryana FNP Unavailable         Unavailable

 

                    [place], A Maryana FNP Unavailable         Unavailable

 

                    [place], A Maryana FNP Unavailable         Unavailable

 

                    [place], A Maryana FNP Unavailable         Unavailable

 

                    [place], A Maryana FNP Unavailable         Unavailable

 

                    [place], A Maryana FNP Unavailable         Unavailable

 

                    [place], A Maryana FNP Unavailable         Unavailable

 

                    [place], A Maryana FNP Unavailable         Unavailable

 

                    [place], A Maryana FNP Unavailable         Unavailable

 

                    [place], A Maryana FNP Unavailable         Unavailable

 

                    [place], A Maryana FNP Unavailable         Unavailable

 

                    [place], A Maryana FNP Unavailable         Unavailable

 

                    [place], A Maryana FNP Unavailable         Unavailable

 

                    [place], A Maryana FNP Unavailable         Unavailable

 

                    [place], A Maryana FNP Unavailable         Unavailable

 

                    [place], A Maryana FNP Unavailable         Unavailable

 

                    [place], A Maryana FNP Unavailable         Unavailable

 

                    [place], A Maryana FNP Unavailable         Unavailable

 

                    [place], A Maryana FNP Unavailable         Unavailable

 

                    [place], A Maryana FNP Unavailable         Unavailable

 

                    [place], A Maryana FNP Unavailable         Unavailable

 

                    [place], A Maryana FNP Unavailable         Unavailable

 

                    [place], A Maryana FNP Unavailable         Unavailable

 

                    [place], A Maryana FNP Unavailable         Unavailable

 

                    [place], A Maryana FNP Unavailable         Unavailable

 

                    [place], A Maryana FNP Unavailable         Unavailable

 

                    [place], A Maryana FNP Unavailable         Unavailable

 

                    [facility],  [facility]        Unavailable         Unavailable



                                  



Re-disclosure Warning

          The records that you are about to access may contain information from 
federally-assisted alcohol or drug abuse programs. If such information is 
present, then the following federally mandated warning applies: This information
has been disclosed to you from records protected by federal confidentiality 
rules (42 CFR part 2). The federal rules prohibit you from making any further 
disclosure of this information unless further disclosure is expressly permitted 
by the written consent of the person to whom it pertains or as otherwise 
permitted by 42 CFR part 2. A general authorization for the release of medical 
or other information is NOT sufficient for this purpose. The Federal rules 
restrict any use of the information to criminally investigate or prosecute any 
alcohol or drug abuse patient.The records that you are about to access may 
contain highly sensitive health information, the redisclosure of which is 
protected by Article 27-F of the [place] Public Health law. If you 
continue you may have access to information: Regarding HIV / AIDS; Provided by 
facilities licensed or operated by the [facility] Office of Mental Health; 
or Provided by the [facility] Office for People With Developmental 
Disabilities. If such information is present, then the following [place] 
mandated warning applies: This information has been disclosed to you from 
confidential records which are protected by state law. State law prohibits you 
from making any further disclosure of this information without the specific 
written consent of the person to whom it pertains, or as otherwise permitted by 
law. Any unauthorized further disclosure in violation of state law may result in
a fine or half-way sentence or both. A general authorization for the release of 
medical or other information is NOT sufficient authorization for further disc
losure.                                                                         
    



Allergies and Adverse Reactions

          



           Type       Description Substance  Reaction   Status     Data Source(s

)

 

           Allergy to substance Allergy to substance Penicillin                 

      [place] ([facility])

 

           Allergy to substance Allergy to substance Penicillin                 

      [facility] ([facility])

 

           Allergy to substance Allergy to substance Penicillin                 

      [facility] ([facility])

 

           Drug allergy PENICILLIN PENICILLIN Anaphylaxsis SV             [facility]



                                                                                
                                     



Encounters

          



           Encounter  Providers  Location   Date       Indications Data Source(s

)

 

                Outpatient      Attender: Mohsin Ali MD Main office - [place] 

2021 08:00:00 

AM EST                                              [facility] ([facility] )

 

           Outpatient Attender: SHYAM TAMEZ MD Main Office 2020 08:00:00

 AM EST            

[facility] ([facility])

 

                                        Katarzyna Escalante LMSW: [address] [place] [address], Ph. [phone]                  Attender: Katarzyna Escalante [facility]         2020 12:00:00 AM EST                     [facility] ([facility])

 

                                        Katarzyna Escalante LMSW: [address] [place] [address], Ph. [phone]                  Attender: Katarzyna Escalante [facility]         2020 12:00:00 AM EST                     [facility] ([facility])

 

                                        Katarzyna Escalante LMSW: [address] [place] [address], Ph. [phone]                  Attender: Katarzyna Escalante [facility]         2020 12:00:00 AM EST                     [facility] ([facility])

 

                                        MARIA Alvarez-BC: [address] [place]

[place] [address], Ph. [phone]                  Attender: Maryana SIFUENTES [facility]       11/10/2020 12:00:00 AM EST                     DARIUS ([facility])

 

                                        MARIA Alvarez-BC: [address], [place] [address], Ph. [phone]                  Attender: Maryana SIFUENTES [facility]       11/10/2020 12:00:00 AM EST                     DARIUS ([facility])

 

                                        MARIA Alvarez-BC: [address], [place] [address], Ph. [phone]                  Attender: Maryana SIFUENTES [facility]       11/10/2020 12:00:00 AM EST                     DARIUS ([facility])

 

           Outpatient Attender: SHYAM TAMEZ MD Main Office 10/28/2020 08:45:00

 AM EDJAYLEEN PARSONS ([facility])

 

           Outpatient Attender: MARIA SIFUENTES          10/19/2020 08:01:03 P

M EDT            [facility]

 

           Outpatient Attender: Maryana SIFUENTES          10/16/2020 12:1

7:01 PM EDT            [facility]

 

           Outpatient Attender: MARIA SIFUENTES          10/15/2020 03:27:01 P

M EDT            [facility]

 

           Outpatient Attender: MARIA COONEY         10/14/2020 09:48:00 A

M EDT            [facility]

 

           Outpatient Attender: Maryana COONEY         10/13/2020 11:1

4:00 AM EDT            [facility]

 

           Outpatient Attender: MARIA COONEY         10/05/2020 06:31:57 A

M EDT            [facility]

 

           Outpatient Attender: MRAIA COONEY         10/02/2020 02:27:00 P

M EDT            [facility]

 

           Outpatient Attender: Maryana COONEY         2020 02:5

6:04 PM EDT            [facility]

 

           Outpatient Attender: MARIA COONEY         2020 12:44:00 P

M EDT            [facility]

 

           Outpatient Attender: MARIA COONEY         2020 01:37:01 P

M EDT            [facility]

 

           Outpatient Attender: Maryana LUGOP FP         2020 04:0

3:01 PM EDT            [facility]

 

           Outpatient Attender: MARIA SIFUENTES FP         2020 04:03:01 P

M EDT            [facility]

 

           Outpatient Attender: MARIA LUGOP FP         2020 01:55:02 P

M EDT            [facility]

 

           Outpatient Attender: Maryana SIFUENTES FP         2020 01:5

5:02 PM EDT            [facility]

 

           Outpatient Attender: MARIA SIFUENTES FP         2020 03:00:01 P

M EDT            [facility]

 

           Outpatient Attender: Maryana SIFUENTES FP         2020 03:1

6:01 PM EDT            [facility]

 

           Outpatient Attender: Maryana SIFUENTES FP         2020 09:4

0:00 AM EDT            [facility]

 

           Outpatient Attender: Maryana SIFUENTES FP         2020 04:2

2:59 AM EDT            [facility]

 

           Outpatient Attender: MARIA SIFUENTES FP         2020 02:52:02 P

M EDT            [facility]

 

           Outpatient Attender: Maryana SIFUENTES FP         2020 02:5

2:01 PM EDT            [facility]

 

           Outpatient Attender: MARIA SIFUENTES FP         2020 02:52:01 P

M EDT            [facility]

 

           Outpatient Attender: MARIA SIFUENTES FP         2020 01:34:00 P

M EDT            [facility]

 

           Outpatient Attender: Maryana SIFUENTES FP         08/10/2020 08:4

3:01 AM EDT            [facility]

 

           Outpatient Attender: MARIA SIFUENTES FP         2020 07:45:00 A

M EDT            [facility]                 [address] [place] [address] 2020 

12:00:00 AM EDT                                     eCW1 ([facility])

 

           Outpatient Attender: Maryana SIFUENTES FP         2020 10:1

0:02 PM EDT            [facility]

 

           Outpatient Attender: MARIA SIFUENTES FP         2020 03:06:01 P

M EDT            [facility]

 

           Outpatient Attender: MARIA LUGOP          2020 12:08:03 A

M EDT            [facility]

 

           Outpatient Attender: Maryana LUGOP          2020 12:0

8:01 AM EDT            [facility]

 

           Outpatient Attender: MARIA Jarrett FNP          2020 03:06:02 P

M EDT            [facility]

 

           Outpatient Attender: Maryana LUGOP          2020 03:0

5:00 PM EDT            [facility]

 

           Outpatient Attender: MARIA LUGOP          2020 03:05:00 P

M EDT            [facility]

 

           [facility]: [facility]         2020 11:09:01 A

M EDT            [facility]                 [address] [place] [id number] 2020 

12:00:00 AM EDT                                     eC ([facility])

 

           Outpatient Attender: Maryana Jarrett [facility]     2020 01:3

8:59 PM EDT            

[facility]

 

           Outpatient Attender: MARIA Jarrett [facility]     2020 11:50:02 A

M EDT            [facility]

 

           Outpatient Attender: MARIA Jarrett [facility]     2020 10:25:01 A

M EDT            [facility]

 

           Outpatient Attender: Maryana Jarrett [facility]     2020 03:0

7:02 PM EDT            

[facility]

 

           [facility]: [facility]     [facility] 10:1

4:02 AM EDT            

[facility]

 

           [facility]     [facility] 02:51:01 P

M EDT            [facility]

 

           [facility]: [facility]     2020 01:5

0:02 PM EDT            

[facility]

 

           [facility]: [facility]     2020 01:50:01 P

M EDT            [facility]

 

           [facility]: [facility]     2020 12:4

1:36 PM EDT            

[facility]

 

           Outpatient Attender: MARIA Jarrett MARIA [facility]     2020 03:28:00 P

M EDT            [facility]

 

           Outpatient Attender: COOKIE MUSE [facility]     2020 03:27:01 PM EDT

            [facility]

 

           Outpatient Referrer: Lakeisha Don DO            2020 03:22

:00 PM EST            

[facility]

 

           [facility]     2020 01:09:02 PM EST

            [facility]

 

           [facility]: [facility]     2020 11:20:23 AM EST

            [facility]

 

           Outpatient Attender: COOKIE MICHEL ALL        2020 12:00:31 AM EST

            [facility]

 

           Outpatient Attender: COOKIE MICHEL ALL        2020 03:35:59 PM EST

            [facility]

 

           Outpatient Referrer: Lakeisha Don DO            2020 07:36

:00 PM EST            

[facility]

 

                    [facility]                     [address] [place], [place] 

[address]          2019 12:00:00 AM EST                     eCW1 ([facility])



                                                                                
                                                                                
                                                                                
                                                                                
                                                                                
                                                                                
                                                                                
                                                                                
                                        



Medications

          



          Medication Brand Name Start Date Product Form Dose      Route     Admi

nistrative 

Instructions Pharmacy Instructions Status     Indications Reaction   Description

 Data 

Source(s)

 

          25 mg               2020 12:00:00 AM EST tablet    30           

       TAKE 1/2 TABLET BY MOUTH EVERY 

MORNING    TAKE 1/2 TABLET BY MOUTH EVERY MORNING SOLD: 2020              

                    [facility]

 

                    Losartan Potassium 25 MG Oral Tablet Losartan Potassium   12:00:00 AM 

EST                  ORAL                 active                      [facility] ([facility]

 

                    doxycycline hyclate 100 MG Oral Capsule Doxycycline Hyclate 

2020 12:00:00 

AM EST               ORAL                 active                      [facility] ([facility]

 

                    doxycycline hyclate 100 MG Oral Capsule DOXYCYCLINE HYCLATE 

2020 12:00:00 

AM EST       capsule      20                        TAKE ONE CAPSULE BY MOUTH TW

ICE A DAY FOR 10 DAYS TAKE ONE 

CAPSULE BY MOUTH TWICE A DAY FOR 10 DAYS SOLD: 2020                       

                 [facility]

 

        10 mg           2020 12:00:00 AM EST tablet  30              TAKE 

1 TABLET BY MOUTH DAILY TAKE

1 TABLET BY MOUTH DAILY SOLD: 2020                                        

[facility]

 

        10 mg           2020 12:00:00 AM EST tablet  30              TAKE 

1 TABLET BY MOUTH DAILY TAKE

1 TABLET BY MOUTH DAILY SOLD: 2021                                        

[facility]

 

        10 mg           2020 12:00:00 AM EST tablet  30              TAKE 

1 TABLET BY MOUTH DAILY TAKE

1 TABLET BY MOUTH DAILY SOLD: 2020                                        

[facility]

 

           Cholecalciferol 1000 UNT Oral Tablet Vitamin D  10/27/2020 12:00:00 A

M EDT                       

ORAL                          active                                  [facility] ([facility]

 

        Loratadine 10 MG Oral Tablet Loratadine 10/27/2020 12:00:00 AM EDT      

                                   

active                                                          [facility] ([facility]

 

          25 mcg (1,000 unit)           2020 12:00:00 AM EDT tablet    30 

                 TAKE ONE TABLET BY 

MOUTH EVERY DAY, STARTING AFTER 50,000 UNIT CAPSULES ARE COMPLETED TAKE ONE 

TABLET BY MOUTH EVERY DAY, STARTING AFTER 50,000 UNIT CAPSULES ARE COMPLETED 

SOLD: 2020                                                 [facility]

 

          25 mcg (1,000 unit)           2020 12:00:00 AM EDT tablet    30 

                 TAKE ONE TABLET BY 

MOUTH EVERY DAY, STARTING AFTER 50,000 UNIT CAPSULES ARE COMPLETED TAKE ONE 

TABLET BY MOUTH EVERY DAY, STARTING AFTER 50,000 UNIT CAPSULES ARE COMPLETED 

SOLD: 2020                                                 [facility]

 

          25 mcg (1,000 unit)           2020 12:00:00 AM EDT tablet    30 

                 TAKE ONE TABLET BY 

MOUTH EVERY DAY, STARTING AFTER 50,000 UNIT CAPSULES ARE COMPLETED TAKE ONE 

TABLET BY MOUTH EVERY DAY, STARTING AFTER 50,000 UNIT CAPSULES ARE COMPLETED 

SOLD: 2020                                                 [facility]

 

          25 mcg (1,000 unit)           2020 12:00:00 AM EDT tablet    30 

                 TAKE ONE TABLET BY 

MOUTH EVERY DAY, STARTING AFTER 50,000 UNIT CAPSULES ARE COMPLETED TAKE ONE 

TABLET BY MOUTH EVERY DAY, STARTING AFTER 50,000 UNIT CAPSULES ARE COMPLETED 

SOLD: 2020                                                 [facility]

 

          0.1 %               2020 12:00:00 AM EDT ointment  15           

       APPLY TO AFFECTED AREA(S) OF 

ARMS TWO TIMES A DAY WHEN ITCHY, REPEAT FOR FLARES APPLY TO AFFECTED AREA(S) OF 

ARMS TWO TIMES A DAY WHEN ITCHY, REPEAT FOR FLARES SOLD: 2020             

                           [facility]

 

        10 mg           07/15/2020 12:00:00 AM EDT tablet  30              TAKE 

1 TABLET BY MOUTH DAILY TAKE

1 TABLET BY MOUTH DAILY SOLD: 2020                                        

[facility]

 

        10 mg           07/15/2020 12:00:00 AM EDT tablet  30              TAKE 

1 TABLET BY MOUTH DAILY TAKE

1 TABLET BY MOUTH DAILY SOLD: 2020                                        

[facility]

 

        10 mg           07/15/2020 12:00:00 AM EDT tablet  30              TAKE 

1 TABLET BY MOUTH DAILY TAKE

1 TABLET BY MOUTH DAILY SOLD: 10/05/2020                                        

[facility]

 

          1,250 mcg (50,000 unit)           2020 12:00:00 AM EDT capsule  

 4                   TAKE 1 CAP BY 

MOUTH ONCE WEEKLY FOR 12 WEEKS THEN CHANGE TO 1000 UNIT TAB` DAILY THEREAFTER 

TAKE 1 CAP BY MOUTH ONCE WEEKLY FOR 12 WEEKS THEN CHANGE TO 1000 UNIT TAB` DAILY
THEREAFTER   SOLD: 2020                                        [facility]

[facility]

 

          10 mg               2020 12:00:00 AM EDT tablet    35           

       TAKE 2 TABLETS BY MOUTH TWICE 

DAILY FOR 5 DAYS THEN 1 TABLET TWICE DAILY FOR 5 DAYS THEN 1 TABLET DAILY TAKE 2

TABLETS BY MOUTH TWICE DAILY FOR 5 DAYS THEN 1 TABLET TWICE DAILY FOR 5 DAYS 
THEN 1 TABLET DAILY SOLD: 05/15/2020                                        [facility]

 

          1,250 mcg (50,000 unit)           2020 12:00:00 AM EDT capsule  

 4                   TAKE 1 CAP BY 

MOUTH ONCE WEEKLY FOR 12 WEEKS THEN CHANGE TO 1000 UNIT TAB` DAILY THEREAFTER 

TAKE 1 CAP BY MOUTH ONCE WEEKLY FOR 12 WEEKS THEN CHANGE TO 1000 UNIT TAB` DAILY
THEREAFTER   SOLD: 05/15/2020                                        [facility]

 

          1,250 mcg (50,000 unit)           2020 12:00:00 AM EDT capsule  

 4                   TAKE 1 CAP BY 

MOUTH ONCE WEEKLY FOR 12 WEEKS THEN CHANGE TO 1000 UNIT TAB` DAILY THEREAFTER 

TAKE 1 CAP BY MOUTH ONCE WEEKLY FOR 12 WEEKS THEN CHANGE TO 1000 UNIT TAB` DAILY
THEREAFTER   SOLD: 2020                                        [facility]

 

        10 mg           2020 12:00:00 AM EDT tablet  30              TAKE 

1 TABLET BY MOUTH DAILY TAKE

1 TABLET BY MOUTH DAILY SOLD: 2020                                        

[facility]

 

          0.05 %              2020 12:00:00 AM EDT ointment  15           

       APPLY TO AFFECTED AREA(S) ON 

ARMS AND LEGS TWICE DAILY AS NEEDED     APPLY TO AFFECTED AREA(S) ON ARMS AND LE

GS 

TWICE DAILY AS NEEDED SOLD: 2020                                        [facility]

 

        10 mg           2020 12:00:00 AM EDT tablet  30              TAKE 

1 TABLET BY MOUTH DAILY TAKE

1 TABLET BY MOUTH DAILY SOLD: 2020                                        

[facility]

 

          50 mcg/actuation           2020 12:00:00 AM EDT spray,suspension

 16                  SPARY 1 

SPRAY IN EACH NOSTRIL TWICE DAILY AS NEEDED SPARY 1 SPRAY IN EACH NOSTRIL TWICE 

DAILY AS NEEDED SOLD: 2020                                        [facility]

 

          250 mg              2020 12:00:00 AM EDT tablet    6            

       TAKE 2TABS BY MOUTH ON DAY 1 THEN

1 TABLET DAILY FOR 4 DAYS               TAKE 2TABS BY MOUTH ON DAY 1 THEN 1 TABL

ET DAILY FOR 4

DAYS         SOLD: 2020                                        [facility]

[facility]



                                                                                
                                                                                
                                                                                
                                                                           



Insurance Providers

          



             Payer name   Policy type / Coverage type Policy ID    Covered party

 ID Covered 

party's relationship to francois Policy Francois             Plan Information

 

          [employer] COMM[employer] PLAN MCDO           [id number]           SP           

       [id number]

 

          MEDICAID  M         [id number]            S                   [id number]

 

          [facility] O         [id number]           S              

     [id number]

 

          EMEDNY              [id number]            SP                  [id number]

 

          [facility] COMMUNITY PLAN MCDO           [id number]           SP           

       [id number]

 

          Medicaid  S         [id number]            S                   [id number]

 

          [facility] Community Plan P         [id number]           S     

              [id number]

 

          MEDICAID            [id number]            SP                  [id number]

 

          Medicaid  S         [id number]            S                   [id number]

 

          [employer] COMMUNITY PLAN MCDO           [id number]           SP           

       [id number]

 

          [facility] COMMUNITY PLAN MCDO           [id number]           SP           

       [id number]

 

          [facility]           [id number]           SP            

      [id number]

 

          [facility] BLUE            [id number]           SP                  [id number]

[id number]

 

                              [id number]                                [id number]



                                                                                
                                                                                
                                                        



Problems, Conditions, and Diagnoses

          



           Code       Display Name Description Problem Type Effective Dates Data

 Source(s)

 

           30736783   Hypersomnia Hypersomnia Problem    2021 12:00:00 AM 

EST [facility] 

([facility] )

 

             [id number]    Malaise and fatigue Malaise and fatigue Problem      0

2021 12:00:00 AM

EST                                     [facility] ([facility] )

 

                58187595        Obstructive sleep apnea syndrome Obstructive sle

ep apnea syndrome 

Problem                   2021 12:00:00 AM EST [facility] ([facility]

 

             27150436     Essential hypertension Essential hypertension Problem 

     2020 

12:00:00 AM EST                         [facility] ([facility])

 

           [id number]  Morbid obesity Morbid obesity Problem    2020 12:00:

00 AM EST 

[facility] ([facility])

 

           45011827   Palpitations Palpitations Problem    2020 12:00:00 A

M EST [facility] 

([facility])

 

                    [id number]           Abnormal results of cardiovascular funct

ion studies Abnormal results 

of cardiovascular function studies Problem             2020 12:00:00 AM ES

T [facility] 

([facility])

 

           [id number]   Snoring    Snoring    Problem    10/28/2020 12:00:00 AM ED

T [facility] ([facility]

 

                    [id number]           Elevated blood-pressure reading without 

diagnosis of hypertension 

Elevated blood-pressure reading without diagnosis of hypertension Problem       

            

10/28/2020 12:00:00 AM EDT              [facility] ([facility])

 

             [id number]    Electrocardiogram abnormal Electrocardiogram abnormal 

Problem      

10/28/2020 12:00:00 AM EDT              [facility] ([facility])

 

                [id number]       Dietary management surveillance Dietary manageme

nt surveillance 

Problem                   10/28/2020 12:00:00 AM EDT [facility] ([facility]

 

             18505431     Type 2 diabetes mellitus Type 2 diabetes mellitus Prob

tariq      10/28/2020 

12:00:00 AM EDT                         [facility] ([facility])

 

           [id number]  Obesity    Obesity    Problem    10/28/2020 12:00:00 AM ED

T [facility] ([facility]

 

           07219570   Chest pain Chest pain Problem    10/28/2020 12:00:00 AM ED

T [facility] 

([facility])

 

                    F33.0               Major depressive disorder, recurrent, mi

ld DEPRESSIVE DISORDER, MAJOR, 

RECURRENT EPISODE, MILD                     2020 12:42:26 PM EDT [facility]

 

             300.02       GENERALIZED ANXIETY DISORDER GENERALIZED ANXIETY DISOR

SYLVESTER              2020 

12:42:26 PM EDT                         [facility]

 

           300.09     Anxiety depression Anxiety depression            

0 02:51:28 PM EDT [facility]

 

             [id number]    Emotional state finding Emotional State Finding Proble

m      2020 

12:00:00 AM EDT                         DARIUS ([facility])

 

             052314346    Emotional state finding Emotional State Finding Proble

      2020 

12:00:00 AM EDT                         [place] ([facility])

 

             677010467    Emotional state finding Emotional State Finding Proble

      2020 

12:00:00 AM EDT                         [place] ([facility])

 

                    V65.8               Person consulting for explanation of exa

mination or test findings Person 

consulting for explanation of examination or test findings                      

     2020 12:07:55

AM EDT                                  [facility]

 

                    V70.0               Encounter for general adult medical exam

ination with abnormal findings 

Encounter for general adult medical examination with abnormal findings          

                 

2020 03:04:36 PM EDT              [facility]

 

             [id number]     Atopic dermatitis, unspecified Atopic dermatitis, unsp

ecified              

2020 03:04:36 PM EDT              [facility]

 

           268.9      vitamin D deficiency vitamin D deficiency             03:04:36 PM EDT 

[facility]

 

             [id number]    Patient asked to attend Patient Asked to Attend [facility]      2020 

12:00:00 AM EDT                         [place] ([facility])

 

             [id number]    Procedure by method Procedure by Method Problem      0

2020 12:00:00 AM

EDT                                     [place] ([facility])

 

           32836066   Atopic dermatitis Atopic Dermatitis Problem    2020 

12:00:00 AM EDT 

[facility])

 

             48164763     Vitamin D deficiency Vitamin D Deficiency Problem     

 2020 12:00:00 

AM EDT                                  [place] ([facility])

 

             [id number]    Patient asked to attend Patient Asked to Attend [facility]      2020 

12:00:00 AM EDT                         [place] ([facility])

 

             [id number]    Procedure by method Procedure by Method Problem      0

2020 12:00:00 AM

EDT                                     [place] ([facility])

 

           65939310   Atopic dermatitis Atopic Dermatitis Problem    2020 

12:00:00 AM EDT 

[facility])

 

             72010960     Vitamin D deficiency Vitamin D Deficiency Problem     

 2020 12:00:00 

AM EDT                                  [place] ([facility])

 

             [id number]    Patient asked to attend Patient Asked to Attend Proble

m      2020 

12:00:00 AM EDT                         DARIUS ([facility])

 

             [id number]    Procedure by method Procedure by Method Problem      0

2020 12:00:00 AM

EDT                                     DARIUS ([facility])

 

           [id number]   Atopic dermatitis Atopic Dermatitis Problem    2020 

12:00:00 AM EDT 

[place] ([facility])

 

             [id number]     Vitamin D deficiency Vitamin D Deficiency Problem     

 2020 12:00:00 

AM EDT                                  [place] ([facility])

 

           782.1      Eruption   Eruption              2020 11:48:08 AM ED

T [facility]

 

             J30.2        Other seasonal allergic rhinitis Other seasonal allerg

ic rhinitis              

2020 11:48:08 AM EDT              [facility]

 

                    [id number]            Acute upper respiratory infection, unspe

cified Acute upper respiratory 

infection, unspecified                     2020 11:48:08 AM EDT [facility]

 

           [id number]  Eruption   Eruption   Problem    2020 12:00:00 AM ED

T [place] ([facility])

 

                    [id number]           Disorder of upper respiratory system Dis

order of Upper Respiratory 

System              Problem             2020 12:00:00 AM EDT [place] ([facility])

 

             [id number]    Seasonal allergic rhinitis Seasonal Allergic Rhinitis 

Problem      

2020 12:00:00 AM EDT              [place] ([facility])

 

           [id number]  Eruption   Eruption   Problem    2020 12:00:00 AM ED

T [place] ([facility])

 

                    [id number]           Disorder of upper respiratory system Dis

order of Upper Respiratory 

System              Problem             2020 12:00:00 AM EDT [place] ([facility])

 

             [id number]    Seasonal allergic rhinitis Seasonal Allergic Rhinitis 

Problem      

2020 12:00:00 AM EDT              [place] ([facility])

 

           [id number]  Eruption   Eruption   Problem    2020 12:00:00 AM ED

T [place] ([facility])

 

                    [id number]           Disorder of upper respiratory system Dis

order of Upper Respiratory 

System              Problem             2020 12:00:00 AM EDT [place] ([facility])

 

             [id number]    Seasonal allergic rhinitis Seasonal Allergic Rhinitis 

Problem      

2020 12:00:00 AM EDT              DAR[facility]US ([facility])

 

           V85.37     BMI 37.0-37.9 BMI 37.0-37.9            2020 03:35:17

 PM EST [facility]

 

                    08369980            Type 2 diabetes mellitus without complic

ations Type 2 diabetes mellitus

without complications                     2020 03:35:17 PM EST [facility]

 

             B34.9        Viral infection, unspecified Viral lower respiratory i

nfection              

2020 03:35:17 PM EST              [facility]

 

           278.00     Obesity    Obesity               2020 03:35:17 PM ES

T [facility]

 

           [id number]  Clinical finding Clinical Finding Problem    2020 12

:00:00 AM EST 

[facility]US ([facility])

 

           [id number]  Clinical finding Clinical Finding Problem    2020 12

:00:00 AM EST 

[facility]US ([facility])

 

             [id number]    Body mass index 30+ - obesity Body Mass Index 30+ - Ob

esity Problem      

2020 12:00:00 AM EST              [facility] ([facility])

 

           [id number]  Simple obesity Simple Obesity Problem    2020 12:00:

00 AM EST 

[facility]US ([facility])

 

           [id number]  Clinical finding Clinical Finding Problem    2020 12

:00:00 AM EST 

[facility]US ([facility])

 

           [id number]  Clinical finding Clinical Finding Problem    2020 12

:00:00 AM EST 

[facility]US ([facility])

 

             450998499    Body mass index 30+ - obesity Body Mass Index 30+ - Ob

esity Problem      

2020 12:00:00 AM EST              DAR[facility]US ([facility]

er)

 

           [id number]  Simple obesity Simple Obesity Problem    2020 12:00:

00 AM EST 

[facility]US ([facility])

 

           [id number]  Clinical finding Clinical Finding Problem    2020 12

:00:00 AM EST 

[facility]US ([facility])

 

           [id number]  Clinical finding Clinical Finding Problem    2020 12

:00:00 AM EST 

[facility]US ([facility])

 

             [id number]    Body mass index 30+ - obesity Body Mass Index 30+ - Ob

esity Problem      

2020 12:00:00 AM EST              [facility]US ([facility]

er)

 

           [id number]  Simple obesity Simple Obesity Problem    2020 12:00:

00 AM EST 

D[facility]US ([facility])



                                                                                
                                                                                
                                                                                
                                                                                
                                                                                
                                                                                
                                                                                
                                                                                
                                                                                



Surgeries/Procedures

          



             Procedure    Description  Date         Indications  Data Source(s)

 

             CV STRS TST XERS&/OR RX CONT ECG PHYS SI&R              2020 

12:00:00 AM EST              [facility]

 

                    Ambulatory Blood Pressure Monitoring;Recording,Scanning,Inte

r,RPT                     2020 

12:00:00 AM EST                                     JAQUELINE ([facility])

 

             ECHO TTHRC R-T 2D W/WOM-MODE COMPL SPEC&COLR DOP               12:00:00 AM EST              

[facility]OR[facility] ([facility])

 

             ECG ROUTINE ECG W/LEAST 12 LDS W/I&R              10/28/2020 12:00:

00 AM EDT              [facility] 

([facility])



                                                                                
                                     



Results

          



                    ID                  Date                Data Source

 

                    [id number] 11/10/2020 08:05:00 AM 

EST [facility]AR[facility]US ([facility])









          Name      Value     Range     Interpretation Code Description Data Kyara

rce(s) Supporting 

Document(s)

 

             total 25(oh) vitamin D 24.7 NG/mL   30.0-100.0   Below low normal T

otal 25(Oh) 

Vitamin D                 [place] ([facility])  









                    ID                  Date                Data Source

 

                    [id number] 11/10/2020 08:05:00 AM 

EST [facility]US ([facility])









          Name      Value     Range     Interpretation Code Description Data Kyara

rce(s) Supporting 

Document(s)

 

           cholesterol level 144 mg/dL  <200       normal     Cholesterol Level 

[facility]AR[facility]US ([facility])                    

 

           triglycerides level 59 mg/dL   <150       normal     Triglycerides Le

isaac DAR[facility]US ([facility])            

 

           cholesterol risk ratio            <5         normal     Cholesterol R

isk Ratio [facility]US ([facility])                    

 

           non-HDL-C  92 mg/dL              normal     Non-hdl-c  [facility]US ([facility])                                  

 

           HDL cholesterol 52 mg/dL   >40        normal     HDL Cholesterol ATHE

NA ([facility])                           

 

             Cholesterol in LDL [Mass/volume] in Serum or Plasma 80 mg/dL     <1

00         normal       LDL 

Cholesterol               DARIUS ([facility])  









                    ID                  Date                Data Source

 

                    [id number] 11/10/2020 08:05:00 AM 

EST [facility]US ([facility])









          Name      Value     Range     Interpretation Code Description Data Kyara

rce(s) Supporting 

Document(s)

 

           blood urea nitrogen 12 mg/dL   7-18       normal     Blood Urea Nitro

gen [facility]US ([facility])            

 

           creatinine for GFR 0.73 mg/dL 0.55-1.30  normal     Creatinine for GF

R [place] ([facility])            

 

           glomerular filtration rate > 60.0     >58        normal     Glomerula

r Filtration Rate [facility]US 

([facility])     

 

           glucose, fasting 102 mg/dL       Above high normal Glucose, Fas

ting [facility]US 

([facility])     

 

           chloride level 107 mEq/L       normal     Chloride Level [place]

 ([facility])                    

 

           sodium level 141 mEq/L  136-145    normal     Sodium Level [facility]US (No

[facility])                           

 

           carbon dioxide level 28 mEq/L   21-32      normal     Carbon Dioxide 

Level DAR[facility]US ([facility])            

 

           potassium serum 4.5 mEq/L  3.5-5.1    normal     Potassium Serum ATHE

 ([facility])                    

 

           ALT/SGPT   16 U/L     12-78      normal     ALT/SGPT   [place] ([facility])                                  

 

           anion gap  6 mEq/L    8-16       Below low normal Anion Gap  DARIUS (

[facility])                           

 

           calcium level 9.0 mg/dL  8.5-10.1   normal     Calcium Level [facility]US (

[facility])                    

 

           AST/SGOT   12 U/L     7-37       normal     AST/SGOT   [facility]US ([facility])

                                         

 

           albumin    3.6 gm/dL  3.2-5.2    normal     Albumin    [place] ([facility])                                  

 

           alkaline phosphatase 88 U/L          normal     Alkaline Phosph

atase [facility]US ([facility])            

 

           total protein 7.8 gm/dL  6.4-8.2    normal     Total Protein [place] (

[facility])                    

 

           bilirubin,total 0.3 mg/dL  0.2-1.0    normal     Bilirubin,total ATHE

 ([facility])                    

 

           albumin/globulin ratio            1.2-2.2    Below low normal Albumin

/globulin Ratio [place] 

([facility])     









                    ID                  Date                Data Source

 

                    [id number] 11/10/2020 08:05:00 AM 

EST DAR[facility]US ([facility])









          Name      Value     Range     Interpretation Code Description Data Kyara

rce(s) Supporting 

Document(s)

 

           Hemoglobin A1c/Hemoglobin.total in Blood 5.6 %                 normal

     Hemoglobin a1C [facility]US 

([facility])     

 

             estimated average glucose 114 mg/dL           Above high norm

al Estimated Average 

Glucose                   DAR[facility]US ([facility])  









                    ID                  Date                Data Source

 

                    [id number] 11/10/2020 08:05:00 AM 

EST DAR[facility]US ([facility])









          Name      Value     Range     Interpretation Code Description Data Kyara

rce(s) Supporting 

Document(s)

 

           red blood count 4.57 10    4.00-5.40  normal     Red Blood Count ATHE

 ([facility])                    

 

           white blood count 7.3 10     4.0-10.0   normal     White Blood Count 

DAR[facility]US ([facility])                   

 

             mean corpuscular hemoglobin 25.4 pg      27.0-33.0    Below low nor

mal Mean Corpuscular 

Hemoglobin                DARIUS ([facility])  

 

           hemoglobin 11.6 g/dL  12.0-15.5  Below low normal Hemoglobin [facility]US (

[facility])                   

 

           mean corpuscular volume 82.9 fL    80.0-96.0  normal     Mean Corpusc

ular Volume [facility]US 

([facility])     

 

           hematocrit 37.9 %     36.0-47.0  normal     Hematocrit [facility] ([facility])                           

 

           red cell distribution width 14.4 %     11.5-14.5  normal     Red Cell

 Distribution Width 

[facility]US ([facility])  

 

           platelet count, automated 223 10     150-450    normal     Platelet C

ount, Automated [facility]US

 ([facility])    

 

             mean corpuscular HGB conc 30.6 g/dL    32.0-36.5    Below low naren

l Mean Corpuscular 

HGB Conc                  DARIUS ([facility])  

 

          eos %     0.7 %     0.0-3.0   normal    Eos %     D[facility]IUS ([facility])  

 

           neutrophils % 64.1 %     36.0-66.0  normal     Neutrophils % D[facility]IUS (

[facility])                          

 

           mono %     6.0 %      0.0-5.0    Above high normal Taney %     [facility]IUS 

([facility])                           

 

           lymph %    28.6 %     24.0-44.0  normal     Lymph %    [facility] ([facility])                                  

 

           nucleated red blood cell % 0.0 %      0-0        normal     Nucleated

 Red Blood Cell % [facility]US 

([facility])     

 

          baso %    0.3 %     0.0-1.0   normal    Baso %    [facility]US ([facility])  

 

           immature granulocyte % 0.3 %      0-3.0      normal     Immature Gran

ulocyte % [facility]US ([facility])            

 

           lymph #    2.1 10     1.5-5.0    normal     Lymph #    D[facility]US ([facility])                                  

 

           neutrophils # 4.7 10     1.5-8.5    normal     Neutrophils # [facility]US (

[facility])                           

 

          eos #     0.1 10    0.0-0.5   normal    Eos #     [facility]US ([facility])  

 

          mono #    0.4 10    0.0-0.8   normal    Taney #    DARIUS ([facility]) 

 

 

          baso #    0.0 10    0.0-0.2   normal    Baso #    [facility]US ([facility]) 

 









                    ID                  Date                Data Source

 

                    [id number] 11/10/2020 08:05:00 AM 

EST DARIUS ([facility])









          Name      Value     Range     Interpretation Code Description Data Kyara

rce(s) Supporting 

Document(s)

 

             total 25(oh) vitamin D 24.7 NG/mL   30.0-100.0   Below low normal T

otal 25(Oh) 

Vitamin D                 [place] ([facility])  









                    ID                  Date                Data Source

 

                    [id number] 11/10/2020 08:05:00 AM 

EST [facility] ([facility])









          Name      Value     Range     Interpretation Code Description Data Kyara

rce(s) Supporting 

Document(s)

 

           triglycerides level 59 mg/dL   <150       normal     Triglycerides Le

isaac [facility] ([facility])            

 

           cholesterol level 144 mg/dL  <200       normal     Cholesterol Level 

[facility] ([facility])                    

 

           cholesterol risk ratio            <5         normal     Cholesterol R

isk Ratio [facility] ([facility])                    

 

             Cholesterol in LDL [Mass/volume] in Serum or Plasma 80 mg/dL     <1

00         normal       LDL 

Cholesterol               [facility]US ([facility])  

 

           non-HDL-C  92 mg/dL              normal     Non-hdl-c  [facility] ([facility])                                  

 

           HDL cholesterol 52 mg/dL   >40        normal     HDL Cholesterol ATHE

 ([facility])                          









                    ID                  Date                Data Source

 

                    [id number] 11/10/2020 08:05:00 AM 

EST [facility]US ([facility])









          Name      Value     Range     Interpretation Code Description Data Kyara

rce(s) Supporting 

Document(s)

 

           creatinine for GFR 0.73 mg/dL 0.55-1.30  normal     Creatinine for GF

R [facility] ([facility])            

 

           glucose, fasting 102 mg/dL       Above high normal Glucose, Fas

ting [facility]US 

([facility])     

 

           blood urea nitrogen 12 mg/dL   7-18       normal     Blood Urea Nitro

gen [facility]US ([facility])            

 

           chloride level 107 mEq/L       normal     Chloride Level [place]

 ([facility])                    

 

           potassium serum 4.5 mEq/L  3.5-5.1    normal     Potassium Serum ATHE

 ([facility])                    

 

           glomerular filtration rate > 60.0     >58        normal     Glomerula

r Filtration Rate [facility]US 

([facility])     

 

           sodium level 141 mEq/L  136-145    normal     Sodium Level [facility]US (No

[facility])                           

 

           carbon dioxide level 28 mEq/L   21-32      normal     Carbon Dioxide 

Level [facility]US ([facility])            

 

           calcium level 9.0 mg/dL  8.5-10.1   normal     Calcium Level [facility]US (

[facility])                    

 

           AST/SGOT   12 U/L     7-37       normal     AST/SGOT   [facility] ([facility])

                                         

 

           ALT/SGPT   16 U/L     12-78      normal     ALT/SGPT   [facility] ([facility])                                  

 

           anion gap  6 mEq/L    8-16       Below low normal Anion Gap  [facility]US (

[facility])                           

 

           bilirubin,total 0.3 mg/dL  0.2-1.0    normal     Bilirubin,total ATHE

 ([facility])                    

 

           alkaline phosphatase 88 U/L          normal     Alkaline Phosph

atase [place] ([facility])            

 

           albumin    3.6 gm/dL  3.2-5.2    normal     Albumin    [facility]US ([facility])                                  

 

           total protein 7.8 gm/dL  6.4-8.2    normal     Total Protein [facility] (

[facility])                    

 

           albumin/globulin ratio            1.2-2.2    Below low normal Albumin

/globulin Ratio [facility] 

([facility])     









                    ID                  Date                Data Source

 

                    [id number] 11/10/2020 08:05:00 AM 

EST D[facility]US ([facility])









          Name      Value     Range     Interpretation Code Description Data Kyara

rce(s) Supporting 

Document(s)

 

           Hemoglobin A1c/Hemoglobin.total in Blood 5.6 %                 normal

     Hemoglobin a1C [place] 

([facility])     

 

             estimated average glucose 114 mg/dL           Above high norm

al Estimated Average 

Glucose                   [place] ([facility])  









                    ID                  Date                Data Source

 

                    [id number] 11/10/2020 08:05:00 AM 

EST D[facility]US ([facility])









          Name      Value     Range     Interpretation Code Description Data Kyara

rce(s) Supporting 

Document(s)

 

           red blood count 4.57 10    4.00-5.40  normal     [facility]                    

 

           white blood count 7.3 10     4.0-10.0   normal     White Blood Count 

[place] ([facility])                   

 

           mean corpuscular volume 82.9 fL    80.0-96.0  normal     Mean Corpusc

ular Volume [facility]US 

([facility])     

 

           hemoglobin 11.6 g/dL  12.0-15.5  Below low normal Hemoglobin [facility]US (

[facility])                   

 

           hematocrit 37.9 %     36.0-47.0  normal     Hematocrit [facility]US ([facility])                           

 

           red cell distribution width 14.4 %     11.5-14.5  normal     Red Cell

 Distribution Width 

[place] ([facility])  

 

           platelet count, automated 223 10     150-450    normal     Platelet C

ount, Automated [facility]US

 ([facility])    

 

             mean corpuscular hemoglobin 25.4 pg      27.0-33.0    Below low nor

mal Mean Corpuscular 

Hemoglobin                [facility]US ([facility])  

 

             mean corpuscular HGB conc 30.6 g/dL    32.0-36.5    Below low naern

l Mean Corpuscular 

HGB Conc                  [place] ([facility])  

 

           mono %     6.0 %      0.0-5.0    Above high normal Taney %     [facility]IUS 

([facility])                           

 

           lymph %    28.6 %     24.0-44.0  normal     Lymph %    [place] ([facility])                                  

 

           neutrophils % 64.1 %     36.0-66.0  normal     Neutrophils % D[facility]US (

[facility])                          

 

          eos %     0.7 %     0.0-3.0   normal    Eos %     [place] ([facility])  

 

          baso %    0.3 %     0.0-1.0   normal    Baso %    [place] ([facility])  

 

           nucleated red blood cell % 0.0 %      0-0        normal     Nucleated

 Red Blood Cell % Brandon 

([facility])     

 

           immature granulocyte % 0.3 %      0-3.0      normal     Immature Gran

ulocyte % [place] ([facility])            

 

           neutrophils # 4.7 10     1.5-8.5    normal     Neutrophils # [facility]US (

[facility])                           

 

          eos #     0.1 10    0.0-0.5   normal    Eos #     [facility]US ([facility])  

 

           lymph #    2.1 10     1.5-5.0    normal     Lymph #    [facility]US ([facility])                                  

 

          baso #    0.0 10    0.0-0.2   normal    Baso #    [facility]US ([facility]) 

 

 

          mono #    0.4 10    0.0-0.8   normal    Taney #    DARIUS ([facility]) 

 









                    ID                  Date                Data Source

 

                    [id number]            10/11/2020 09:58:00 AM EDT [facility] ([facility])









          Name      Value     Range     Interpretation Code Description Data Kyara

rce(s) Supporting 

Document(s)

 

           Calcium [Mass/volume] in Serum or Plasma 5.0                         

                [facility] ([facility])                       

 

           Potassium [Moles/volume] in Serum or Plasma 4.0                      

                   [facility] ([facility])                       

 

           Chloride [Moles/volume] in Serum or Plasma 99                        

                  [facility] ([facility])                       

 

          Sodium    140                                     [facility] ([facility])  

 

           Carbon dioxide, total [Moles/volume] in Serum or Plasma 27.0         

                               [facility] 

([facility])           

 

          Blood Urea Nitrogen 9         5-21                          [facility] ([facility]

[facility])  

 

          Glucose   185                               [facility]  

 

          Creatinine 0.7       0.6-1.5                       [facility] ([facility])  

 

                                        Glomerular filtration rate/1.73 sq M.pre

dicted [Volume Rate/Area] in Serum or 

Plasma by Creatinine-based formula (MDRD) Laboratory test result                

                        [facility] 

([facility])           









                    ID                  Date                Data Source

 

                    [id number]            10/11/2020 09:58:00 AM EDT [facility] ([facility])









          Name      Value     Range     Interpretation Code Description Data Kyara

rce(s) Supporting 

Document(s)

 

          White Blood Count 10.4      4.3-10.9                      [facility] ([facility])  

 

          Red Blood Count 4.39      4.70-6.20                     [facility] ([facility]io

[facility])  

 

          Platelets 241       130-400                       [facility]  

 

          Hemoglobin 11.4      13.0-17.0                     [facility] ([facility])  

 

          Hematocrit 36.0      39.0-50.0                     [facility] ([facility])  









                    ID                  Date                Data Source

 

                    [id number]    2020 01:39:19 PM EDT [facility]

 

                                        Measurements & CalculationsHeight:      

       63 inches (5 ft. 3 in.) 160.02 cm

   Weight:             227 pounds      103.18 kg    Body Mass Index (BMI):  
40.36BMI Interpretation:         Morbidly ObeseBody Surface Area (BSA):    
2.04Weight Management Education Done (Nutrition/Physical Activity)Vital 
SignsTemperature:        98.5F                  oral Pulse Rate:         80 
beats/minuteRespiratory Rate:   18 respirations/minuteBlood Pressure:     97/61 
     right arm           sitting         automaticO2 Saturation:  98%         
room airVital Signs performed by: Fredy Gonzales LPN,  2020 1:52 PMInitial
Intake Information From: patientRoom #: 11Infectious Disease / Travel 
ScreeningRecent travel for you or any close contacts? NoHave you had any close 
contact with anyone diagnosed with or under investigation for COVID-19 
(coronavirus)? NoFever? NoRespiratory symptoms: cough, cold, congestion, 
shortness of breath, difficulty breathing? NoLoss of smell? NoLoss of taste? 
NoSmoking, Tobacco, Vaping or Smoke Exposure StatusSmoke Status: former 
smokerTobacco Use: NoDo you vape? NoPassive Smoke Exposure: NoMenstrual 
HistoryLast Menstrual Period (LMP): 2020Any possibility of pregnancy? 
NoComments: tubal Healthcare HistorySince your last office visit...Have you been
admitted to the hospital? NoHave you been to an emergency room (ER) or urgent 
care clinic? NoHave you seen another healthcare  provider? Yes - dermHave you 
seen a dentist? NoIntake performed by:  Fredy Gonzales LPN,  2020 1:44 
PMRate Your HealthIn general, would you say your health is? Very GoodPain 
AssessmentAre you currently having any pain which...    You would like your 
provider to address? Yes    Affects your activity level? YesDepression Screening
- PHQ-2Over the last two weeks, have you...    Had little interest or pleasure 
in doing things? Not at all    Been feeling down, depressed, or hopeless? Not at
all PHQ-2 Score: 0Anxiety Screening - FRANCISCA-2Over the last two weeks, have you 
been...    Feeling nervous, anxious, or on edge? Not at all    Unable to stop or
control worrying? Not at all FRANCISCA-2 Score: 0Food InsecurityWithin the past 
year...Did you worry whether your food would run out before you got money to buy
more? NoWas there a time when the food you bought didn't last and you didn't 
have money to get more? NoPatient Learning & Communication Needs Preferred 
learning style: by experiencePossible barriers: nonePatient's Language used in 
visit: YesLanguage: english Pain AssessmentPain ScaleNumeric Rating Scale: 6 / 
10Location: lower backDuration: chronicFrequency: DailyCharacter/Quality: aching
and throbbingIs the pain radiating? NoScreening, Brief Intervention, & Referral 
to Treatment (SBIRT)Pre-Screening Questions How many times have you have 4 or 
more drinks in a day? 0How many times have you used an illegal drug or used a 
prescription medication for a non-medical reason? 0Performed by: Fredy Gonzales LPN, 
2020 1:45 PMPatient History Medical History:Diabetes - Type II diet 
controlledHeart Arrhythmia during pregnancy-cleared by Dr. Fuentes for 6 months 
after pregnancySurgical History:Rt ankle reconstruction Cholecysectomy 
2009Tubal ligation 2014Family History:Diabetes (Mother)Cancer - 
Breast (Mother)Depression (Mother)Diabetes (Father)Hypertension 
(Father)Myocardial infarction (MI) (Father) [id number] loss bilateralAcute memory
lossDiabetes (Brother)Diabetes (Sister)Hypothyroidism (Sister)Brain Aneurysm 
(Maternal Grandmother)Diabetes (Paternal Grandmother)Social/Personal History: 
Chief Complaintlab results RM 11 History of Present Illness (HPI)46 yo female PT
here today for lab results. Pt states she has chronic lower back pain. PT states
she is taking all medications with no side effects or issues. Pt states healthy 
diet and physical activities. Pt states was in a toxic relationship but final ly
out.Pt states now living on her own with her two youngest children. Pt states 
much happier but still some anxiety and depression. Pt requesting referral to 
behavioral health. Pt denies any SI or HI. Pt denies smoking. Pt denies 
alcoholism. Pt denies illicit drug use. HPI performed by: Maryana SIFUENTES,
 2020 2:40 PMTransitions of Care InboundMedication Reconciliation & 
ReviewMedication List was reviewed and/or updated during this visit, including 
review of any over-the-counter medications, herbal therapies, and/or 
supplements.Allergy ReviewAllergy List was reviewed and/or updated during this 
visit.Adult Preventive CareProvider Calculated and Reviewed all Clinical 
Protocols for patient today. Labs/Meds/Other Counseling-Nutrition and Physical 
Activity:BMI Interpretation: Morbidly Obese (2020)  Counseling: Done 
(2020)  Physical Activity: Done (2020)Review of Systems General: 
Denies loss of appetite, chills, dizziness, fatigue, fever, continued fever, 
headache, feeling ill, sweats, night sweats, sleep disturbances, weight loss.  
Eyes: Denies blurring of vision, double vision, irritation, discharge, vision 
loss, eye pain, eye swelling, droopy eyelid, sensitivity to light, redness, 
itching.  Ears/Nose/Throat: Denies earache, ear discharge, ringing in ears, 
decreased hearing, nasal congestion, nosebleeds, runny nose, sore throat, 
hoarseness, difficulty swallowing, dry mouth, tooth pain, bleeding gums, swollen
glands.  Cardiovascular: Denies chest pain, palpitations, feeling faint, trouble
breathing w/exertion, SOB upon lying down, SOB at night, peripheral edema, 
elevated blood pressure, decreased heart rate.  Respiratory: Denies cough, 
difficulty breathing, shortness of breath, excessive sputum, coughing up blood, 
wheezing, chest pain.  Breast: Denies discoloration, tenderness, breast changes,
breast lump, nipple discharge.  Gastrointestinal: Denies nausea, vomiting, 
diarrhea, constipation.  Genitourinary: Denies urinary incontinence, pain with 
urination, burning with urination, urinary frequency, urinary hesitancy, urinary
urgency, urinary urgency at night, incomplete emptying, blood in urine.  
Musculoskeletal: Denies back pain, joint pain, leg pain, joint swelling, body 
aches, muscle aches, muscle cramps, muscle weakness, stiffness, recent injury.  
Neurologic: Denies muscle impairment, weakness, numbness/tingling, seizures, 
slurred speech, feeling faint, tremors, vertigo, paralysis on one side, 
paralysis on both sides.  Psychiatric: Complains of depression, anxiety.  Denies
memory loss, mental disturbance, suicidal ideation, homicidal ideation, 
hallucinations, paranoia, feeling stressed, hearing voices.  Endocrine: Denies 
cold intolerance, heat intolerance, excessive thirst, excessive hunger, 
excessive urination, weight loss, weight gain.  Physical ExamGeneral Appearance:
well nourished, well hydrated, no acute distressEyes, External: conjunctivae and
lids normal, EOMIRespiratory, Auscultation: clear to auscultation bilaterally; 
no rales, rhonchi, or wheezesRespiratory, Effort: no intercostal retractions or 
use of accessory musclesCardiovascular, Auscultation: S1, S2 audible; no murmur,
rub, or gallop; RRRPeripheral Circulation: no clubbing, cyanosis, edema, or 
varicositiesAbdomen: soft, non-tender, no masses, bowel sounds normalGait & 
Station: normalSkin, Inspection: no rashes, lesions, or ulcerationsOrientation: 
oriented to time, place, and personMood & Affect: no depression, anxiety, or 
agitationJudgment & Insight: intactCare Management Plan Transitions of 
CareInboundRate Your HealthIn general, would you say your health is? Very 
GoodAssessment & Plan Problems:Added: Anxiety depression (ICD-300.09) (ICD10-
F41.8)   Assessment:    Instructions: We have made a referral for you today. We 
will contact you to set this up.Assessed:Person consulting for explanation of 
examination or test findings (ICD-V65.8) (VHW45-L03.2)   Assessment:    
Instructions: We have reviewed your lab results with you today.Type 2 diabetes 
mellitus without complications (UAC84-J09.9)   Assessment:    Instructions:  
Last HGA1c was 5.5. This indicates well controlled type two diabetes. Please 
continue lifestyle changes to include healthy diet and physical activities. 
Please try to limit sugars and carbohydrates in your diet.Obesity (ICD-278.00) 
(DXE26-P11.09)   Assessment:    Instructions: Please continue lifestyle changes 
to include include  healthy diet and physical activities. Please try to limit 
sugars, carbohydrates, sodium and fats in your diet.vitamin D deficiency (ICD-
268.9) (HRW15-M46.9)   Assessment:    Instructions: vitamin D within normal 
levels. may stop taking [place] dose [place] [address] units and start taking maintenance 
dose of 1000 units daily. Please continue healthy diet and physical activ
ities.Patient Instructions/Care Plan: Person consulting for explanation of 
examination or test findings: We have reviewed your lab results with you 
today.Anxiety depression: We have made a referral for you today. We will contact
you to set this up.Type 2 diabetes mellitus without complications:  Last HGA1c 
was 5.5. This indicates well controlled type two diabetes. Please continue 
lifestyle changes to include healthy diet and physical activities. Please try to
limit sugars and carbohydrates in your diet.Obesity: Please continue lifestyle 
changes to include include  healthy diet and physical activities. Please try to 
limit sugars, carbohydrates, sodium and fats in your diet.vitamin D deficiency: 
vitamin D within normal levels. may stop taking weekly dose of [address] units and 
start taking maintenance dose of 1000 units daily. Please continue healthy diet 
and physical activities.----------Plan developed in collaboration with patient 
and/or familyMedications:VITAMIN D3 1000 UNIT ORAL TABLETBETAMETHASONE 
DIPROPIONATE 0.05 % EXTERNAL OINTMENTCLARITIN 10 MG ORAL TABLETFLONASE ALLERGY 
RELIEF 50 MCG/ACT NASAL SUSPENSIONMedication Changes:Refilled:VITAMIN D3 1000 
UNIT ORAL TABLET-1 po daily beginning in 3 mos after 50,000 unit tablets 
completed Qty: 90[Tablet] Refills: 1     Method: ElectronicRemoved:PREDNISONE 10
 MG ORAL TABLET-take two tablets by mouth twice daily x 5 days, then 1 tablet by
 mouth twice daily  x 5 days. then 1 tablet by mouth daily. Qty: 35[Tablet] 
Refills: 0Changed:From: ORAL VITAMIN D3 91796 UNIT ORAL TABLET Qty: 
[id number] Refills: 90[Tablet] To: VITAMIN D3 1000 UNIT ORAL TABLET-1 po 
daily beginning in 3 mos after 50,000 unit tablets completed Qty: 90[Tablet] 
Refills: 1Allergies:PENICILLIN (Critical)Orders:Mental Health Consult [CPT-
52610] COMP METABOLIC PANEL [CPT-14389] CBC W/DIFF [CPT-71179] HgBA1c [CPT-
45492] LIPID PANEL [CPT-41570] Vitamin D 250H Unspecified [CPT-36077] Adult - 
Ofc Vst, EST, Level IV [CPT-70439] Follow-Up Return to clinic: 3 months for 
follow up Clinical Visit Summary CompletedMedications:VITAMIN D3 1000 UNIT ORAL 
TABLET (CHOLECALCIFEROL) 1 po daily beginning in 3 mos after 50,000 unit tablets
completed  #90[Tablet] x 1    Route:ORAL    Entered and Authorized by:  
Maryana SIFUENTES    Electronically signed by:   Maryana SIFUENTES on 
2020    Method used:    Electronically to             [facility] (retail)            [address] [place] [address]      
      Ph: [phone]            Fax: [phone]    Note to Pharmacy: 
Route: ORAL;     Indications:    VITAMIN D DEFICIENCY    RxID:   [id number]
3570Cancelled PREDNISONE 10 MG ORAL TABLET (PREDNISONE) take two tablets by 
mouth twice daily x 5 days, then 1 tablet by mouth twice daily  x 5 days. then 1
 tablet by mouth daily.  #35[Tablet] x 0    Route:ORAL    Entered by: Fredy Gonzales LPN    Authorized by:  Maryana SIFUENTES    Electronically signed by:   
Maryana SIFUENTES on 2020    Method used:    Electronically to        
     [facility] ([place]) [address]            
[place]  [address]            Ph: [phone]            Fax: [phone]
[phone]    RxID:   [id number] signed by Maryana SIFUENTES 
on 2020 at 4:22 
AM________________________________________________________________________ 









          Name      Value     Range     Interpretation Code Description Data Kyara

rce(s) Supporting 

Document(s)

 

                                                                       









                    ID                  Date                Data Source

 

                    [id number]    2020 10:18:02 AM EDT [facility]

 

                                        Labs In-House Blood TestsDate/Time Colle

cted: 2020 8:00  AMTest      

      Result              Reference Range Normal ValueComments: taken from left 
ac, tolerated well.Lisset Shelton,  2020 10:18 AMAssessment & 
Plan Orders:[id number] Vst-Est Level I [CPT-21320] 05920 - Venipuncture [CPT-
96172] Electronically signed by Maryana SIFUENTES on 08/10/2020 at 8:42 
AM________________________________________________________________________ 









          Name      Value     Range     Interpretation Code Description Data Kyara

rce(s) Supporting 

Document(s)

 

                                                                       









                    ID                  Date                Data Source

 

                          [id number]

[id number] 

2020 08:05:00 AM EDT              [facility]









          Name      Value     Range     Interpretation Code Description Data Kyara

rce(s) Supporting 

Document(s)

 

          HGBA1C    5.5 %               N                   [facility]  









                    ID                  Date                Data Source

 

                          [id number]

[id number] 

2020 08:05:00 AM EDT              [facility]









          Name      Value     Range     Interpretation Code Description Data Kyara

rce(s) Supporting 

Document(s)

 

          BG FASTING 93 mg/dL      N                   [facility]  

 

          T4, FREE  0.98 ng/dL 0.76-1.46 N                   [facility]  

 

          TSH       1.870 microintl units/mL 0.358-3.740 N                   [facility]  

 

          VIT D25 TOT 41.7 ng/mL 30.0-100.0 N                   [facility]  









                    ID                  Date                Data Source

 

                          [id number]

[id number] 

2020 08:05:00 AM EDT              [facility]









          Name      Value     Range     Interpretation Code Description Data Kyara

rce(s) Supporting 

Document(s)

 

          HCT       36.3 %    36.0-47.0 N                   [facility]  

 

          HGB       11.7 g/dL 12.0-15.5 L                   [facility]  

 

          MCH       32.2 G/DL pg 32.0-36.5 N                   [facility]  

 

          MCHC      27.7 PG % 27.0-33.0 N                   [facility]  

 

          PLATELETS 194 10 10*3/mm3 150-450   [facility]  

 

          RBC       4.23 10 10*6/mm3 4.00-5.40 [facility]  

 

          RDW       14.1 %    11.5-14.5 [facility]  

 

          WBC TOTAL 6.2       4.0-10.0  N                   [facility]  









                    ID                  Date                Data Source

 

                          [id number]

[id number] 

2020 12:00:00 AM EDT              [facility]









          Name      Value     Range     Interpretation Code Description Data Kyara

rce(s) Supporting 

Document(s)

 

          HGBA1C    5.5 %                                   [facility]  









                    ID                  Date                Data Source

 

                    [id number]    2020 01:24:39 PM EDT [facility]

 

                                        Measurements & CalculationsHeight:      

       63 inches (5 ft. 3 in.) 160.02 cm

   Weight:             222 pounds 2 oz.    100.97 kg    Body Mass Index (BMI):  
39.49BMI Interpretation:         ObeseBody Surface Area (BSA):    2.02Weight 
Management Education Done (Nutrition/Physical Activity)Vital SignsTemperature:  
     97.0FPulse Rate:         88 beats/minuteRespiratory Rate:   14 respirat
ions/minuteBlood Pressure:     125/84     O2 Saturation:  98%         Vital 
Signs performed by: Magaly Sanchez LPN,  May 14, 2020 1:25 PMVital Signs performed 
by: Magaly Sanchez LPN,  May 14, 2020 1:25 PMInitial Intake Information From: 
patientRoom #: 12Infectious Disease / Travel ScreeningRecent travel for you or 
any close contacts? NoHave you had any close contact with anyone diagnosed with 
or under investigation for COVID-19 (coronavirus)? NoFever? NoRespiratory 
symptoms: cough, cold, congestion, shortness of breath, difficulty breathing? 
NoLoss of smell? NoLoss of taste? NoSmoking, Tobacco, Vaping or Smoke Exposure 
StatusSmoke Status: former smokerTobacco Use: NoDo you vape? NoPassive Smoke 
Exposure: NoMenstrual HistoryLast Menstrual Period (LMP): 2020Any 
possibility of pregnancy? NoComments: TubalHealthcare HistorySince your last 
office visit...Have you been admitted to the hospital? NoHave you been to an 
emergency room (ER) or urgent care clinic? NoHave you seen another healthcare  
provider? NoHave you seen a dentist? NoIntake performed by:  Magaly Sanchez LPN,  
May 14, 2020 1:29 PMRate Your HealthIn general, would you say your health is? 
GoodPain AssessmentAre you currently having any pain which...    You would like 
your provider to address? No    Affects your activity level? NoDepression 
Screening - PHQ-2Over the last two weeks, have you...    Had little interest or 
pleasure in doing things? Not at all    Been feeling down, depressed, or 
hopeless? Not at all PHQ-2 Score: 0Anxiety Screening - FRANCISCA-2Over the last two 
weeks, have you been...    Feeling nervous, anxious, or on edge? Not at all    
Unable to stop or control worrying? Not at all FRANCISCA-2 Score: 0Food 
InsecurityWithin the past year...Did you worry whether your food would run out 
before you got money to buy more? NoWas there a time when the food you bought 
didn't last and you didn't have money to get more? NoPRAPARE Sociodemographic 
Characteristics    Race: White   Ethnicity: Not  or    Preferred 
Language: EnglishFamily and Home   Address:    [address] [place] [address]   What is your housing situation today? I have housing   Are you worried 
about losing your housing? NoMoney and Resources In the past year, have you or 
any family members you live with been unable to get any of the following when it
was really needed?    Denies Insecurity: food, utilities, clothing, , 
phone, legal services, otherWithin the past year did you worry whether your food
would run out before you got money to buy more? NoWithin the past year was there
a time when the food you bought didn't last and you didn't have money to get 
more? NoSocial and Emotional Health   How often do you see or talk to people 
that you care about      and feel close to? More than 5 times a week   How 
stressed are you? A little bitAdditional Optional Domains   Do you feel 
physically and emotionally safe where you live? Yes   In the past year, have you
been afraid of a partner, ex-partner? NoScreening, Brief Intervention, & Referr
al to Treatment (SBIRT)Pre-Screening Questions How many times have you have 4 or
more drinks in a day? 0How many times have you used an illegal drug or used a 
prescription medication for a non-medical reason? 0Performed by: Magaly Sanchez LPN,  May 14, 2020 1:31 PMPatient History Medical History:Diabetes - Type II 
diet controlledHeart Arrhythmia during pregnancy-cleared by Dr. Fuentes for 6 
months after pregnancySurgical History:Rt ankle reconstruction Cholecysectomy 
2009Tubal ligation 2014Family History:Diabetes (Mother)Cancer - 
Breast (Mother)Depression (Mother)Diabetes (Father)Hypertension 
(Father)Myocardial infarction (MI) (Father) g0Hjqzamv loss bilateralAcute memory
lossDiabetes (Brother)Diabetes (Sister)Hypothyroidism (Sister)Brain Aneurysm 
(Maternal Grandmother)Diabetes (Paternal Grandmother)Social/Personal History: 
[facility] History of Present Illness (HPI)44 YO 
female here to establish care.Pt was recently seen for acute visit. Pt states 
still redness to bilateral shin. pt states has an appointment schedule to see 
Derm in july for skin eruption of elbows and knees. Pt states respiratory 
symptoms improved. Pt states former patient of [facility]. Pt 
states was seen by Eder Shanks NP but he is now retired. pt states was last seen
about 10 months ago. Pt states chronic conditions includes  obesity, gerd and 
type 2 diabetes, Pt states not presently taking any medications.Pt states 
healthy diet and physical activities. HPI performed by: Maryana SIFUENTES,  
May 14, 2020 1:53 PMProblem ReviewProblem List was reviewed and/or updated 
during this visit.Medication Reconciliation & ReviewMedication List was reviewed
and/or updated during this visit, including review of any over-the-counter 
medications, herbal therapies, and/or supplements.Allergy ReviewAllergy List was
reviewed and/or updated during this visit.Adult Preventive CareProvider 
Calculated and Reviewed all Clinical Protocols for patient today. 
Labs/Meds/Other Counseling-Nutrition and Physical Activity:BMI Interpretation: 
Obese (2020)  Counseling: Done (2020)  Physical Activity: Done 
(2020)Review of Systems General: Denies loss of appetite, chills, 
dizziness, fatigue, fever, continued fever, headache, feeling ill, sweats, night
sweats, sleep disturbances, weight loss.  Eyes: Denies blurring of vision, 
double vision, irritation, discharge, vision loss, eye pain, eye swelling, 
droopy eyelid, sensitivity to light, redness, itching.  Ears/Nose/Throat: Denies
earache, ear discharge, ringing in ears, decreased hearing, nasal congestion, 
nosebleeds, runny nose, sore throat, hoarseness, difficulty swallowing, dry 
mouth, tooth pain, bleeding gums, swollen glands.  Cardiovascular: Denies chest 
pain, palpitations, feeling faint, trouble breathing w/exertion, SOB upon lying 
down, SOB at night, peripheral edema, elevated blood pressure, decreased heart 
rate.  Respiratory: Denies cough, difficulty breathing, shortness of breath, 
excessive sputum, coughing up blood, wheezing, chest pain.  Breast: Denies 
discoloration, tenderness, breast changes, breast lump, nipple discharge.  
Gastrointestinal: Denies nausea, vomiting, diarrhea, constipation.  Genitourin
davey: Denies urinary incontinence, pain with urination, burning with urination, 
urinary frequency, urinary hesitancy, urinary urgency, urinary urgency at night,
incomplete emptying, blood in urine, pelvic pain, abnormal vaginal bleeding, 
vaginal discharge.  Musculoskeletal: Denies back pain, joint pain, leg pain, 
other pain-see comments, joint swelling, body aches, muscle aches, muscle 
cramps, muscle weakness, stiffness, recent injury.  Skin: Complains of rash, 
redness, itching.  Denies hives, dryness, nail changes, suspicious lesions, 
athlete's foot, rash on palms, rash on bottom of feet.       bilat 
shinNeurologic: Denies muscle impairment, weakness, numbness/tingling, seizures,
slurred speech, feeling faint, tremors, vertigo, paralysis on one side, 
paralysis on both sides.  Psychiatric: Denies depression, anxiety, memory loss, 
mental disturbance, suicidal ideation, homicidal ideation, hallucinations, 
paranoia, feeling stressed, hearing voices.  Endocrine: Denies cold intolerance,
heat intolerance, excessive thirst, excessive hunger, excessive urination, 
weight loss, weight gain.  Physical ExamGeneral Appearance: well nourished, well
hydrated, no acute distressEyes, External: conjunctivae and lids normal, 
EOMIRespiratory, Auscultation: clear to auscultation bilaterally; no rales, 
rhonchi, or wheezesRespiratory, Effort: no intercostal retractions or use of 
accessory musclesCardiovascular, Auscultation: S1, S2 audible; no murmur, rub, 
or gallop; RRRPeripheral Circulation: no clubbing, cyanosis, edema, or 
varicositiesAbdomen: soft, non-tender, no masses, bowel sounds normalGait & 
Station: normalSkin, Inspection: rash, arms and legs. plaqes to bilat knees and 
bilat elbows. Orientation: oriented to time, place, and personMood & Affect: no 
depression, anxiety, or agitationJudgment &amp; Insight: intactRate Your 
HealthIn general, would you say your health is? GoodAssessment & Plan 
Problems:Added: Atopic dermatitis, unspecified (WKU20-Q58.9)   Assessment:    
Instructions: We have sent a prescription to your pharmacy today. Please take 
medication as prescribed. please report any mhor side effects.vitamin D 
deficiency (ICD-268.9) (FEH11-C96.9)   Assessment:    Instructions: script sent 
to pharmacy for you today.Encounter for general adult medical examination with 
abnormal findings (ICD-V70.0) (TPL37-T80.01)   Assessment:    Instructions: We 
have had your annual physical exam done today. Please continue medications as 
prescribed. Please continue lifestyle changes to include healthy diet and 
physical activities. Please try to maintain adequate intake of water 
daily.Person consulting for explanation of examination or test findings (ICD-
V65.8) (COL27-H72.2)   Assessment:    Instructions: We have reviewed your lab 
results with you today.Assessed:Type 2 diabetes mellitus without complications 
(FFE07-M40.9)   Assessment:    Instructions: Last HGA1c was 5.9. This indicates 
well controlled type two diabetes. Please continue lifestyle changes to include 
healthy diet and physical activities. Please try to limit sugars and 
carbohydrates in your diet.Obesity (ICD-278.00) (KAF95-B64.09)   Assessment:    
Instructions: Please continue lifestyle changes to include include  healthy diet
and physical activities. Please try to limit sugars, carbohydrates, sodium and 
fats in your diet.Eruption (ICD-782.1) (TEG06-S49)   Assessment:    
Instructions: Please keep your appointment with Derm.Patient Instructions/Care 
Plan: Atopic dermatitis- unspecified: We have sent a prescription to your 
pharmacy today. Please take medication as prescribed. please report any mhor 
side effects.vitamin D deficiency: script sent to pharmacy for you today.Type 2 
diabetes mellitus without complications: Last HGA1c was 5.9. This indicates well
controlled type two diabetes. Please continue lifestyle changes to include 
healthy diet and physical activities. Please try to limit sugars and 
carbohydrates in your diet.Encounter for general adult medical examination with 
abnormal findings: We have had your annual physical exam done today. Please 
continue medications as prescribed. Please continue lifestyle changes to include
healthy diet and physical activities. Please try to maintain adequate intake of 
water daily.Obesity: Please continue lifestyle changes to include include  
healthy diet and physical activities. Please try to limit sugars, carbohydrates,
sodium and fats in your diet.Eruption: Please keep your appointment with 
Derm.Person consulting for explanation of examination or test findings: We have 
reviewed your lab results with you today.----------Plan developed in 
collaboration with patient and/or familyMedications:PREDNISONE 10 MG ORAL 
TABLETVITAMIN D3 24078 UNIT ORAL TABLETBETAMETHASONE DIPROPIONATE 0.05 % 
EXTERNAL OINTMENTCLARITIN 10 MG ORAL TABLETFLONASE ALLERGY RELIEF 50 MCG/ACT 
NASAL SUSPENSIONMedication Changes:New Prescription:VITAMIN D3 90204 UNIT ORAL 
TABLET-1 po q wk for 12 wks, then change to 1000 unit tablet daily thereafter 
Qty: 12[Tablet] Refills: 0     Method: ElectronicPREDNISONE 10 MG ORAL TABLET-
take two tablets by mouth twice daily x 5 days, then 1 tablet by mouth twice 
daily  x 5 days. then 1 tablet by mouth daily. Qty: 35[Tablet] Refills: 0     
Method: ElectronicRemoved:AZITHROMYCIN 250 MG ORAL TABLET-take two tablets by 
mouth on day one, then one tablet daily x 4 days Qty: 6[Tablet] Refills: 
0Allergies:PENICILLIN (Critical)Orders:COMP METABOLIC PANEL [CPT-90147] CBC W/D
IFF [CPT-19395] HgBA1c [CPT-92469] LIPID PANEL [CPT-19028] TSH [CPT-41148] T-4 
free [CPT-72736] Vitamin D 250H Unspecified [CPT-24020] URINALYSIS [CPT-76821] 
Adult - Ofc Vst, EST, Level IV [CPT-35877] Follow-Up Return to clinic: 3 months 
for follow up.  Clinical Visit Summary CompletedMedications:PREDNISONE 10 MG 
ORAL TABLET (PREDNISONE) take two tablets by mouth twice daily x 5 days, then 1 
tablet by mouth twice daily  x 5 days. then 1 tablet by mouth daily.  
#35[Tablet] x 0    Route:ORAL    Entered and Authorized by:  Maryana SIFUENTES    Electronically signed by:   Maryana SIFUENTES on 2020    Method
 used:    Electronically to             [facility] (retail)          
  [address]  [address]            [place]  [address]            Ph: [phone]
[phone]            Fax: [phone]    Note to Pharmacy: Route: ORAL;     RxID: 
  [id number] D3 29364 UNIT ORAL TABLET (CHOLECALCIFEROL) 1 po q wk 
for 12 wks, then change to 1000 unit tablet daily thereafter  #12[Tablet] x 0   
Route:ORAL    Entered and Authorized by:  Maryana SIFUENTES    
Electronically signed by:   Maryana SIFUENTES on 2020    Method used: 
  Electronically to             [facility] (retail)            [address]            [place] [address]            Ph: [phone]       
    Fax: [phone]    Note to Pharmacy: Route: ORAL;     Indications:    
VITAMIN D DEFICIENCY    RxID:   [id number] AZITHROMYCIN 250 MG 
ORAL TABLET (AZITHROMYCIN) take two tablets by mouth on day one, then one tablet
daily x 4 days  #6[Tablet] x 0    Route:ORAL    Entered by: Magaly Sanchez LPN    
Authorized by:  Maryana SIFUENTES    Electronically signed by:   Maryana SIFUENTES on 2020    Method used:    Electronically to             
[facility] (retail)            [address]            
[place] [address]            Ph: [phone]            Fax: [phone]
[phone]    RxID:   [id number] signed by Maryana SIFUENTES 
on 2020 at 12:07 
AM________________________________________________________________________ 









          Name      Value     Range     Interpretation Code Description Data Kyara

rce(s) Supporting 

Document(s)

 

                                                                       









                    ID                  Date                Data Source

 

                    [id number] 2020 11:53:00 AM EDT

 [facility]









          Name      Value     Range     Interpretation Code Description Data Kyara

rce(s) Supporting 

Document(s)

 

          HCT       39.3 %    36.0-47.0 N                   [facility]  

 

          HGB       12.8 g/dL 12.0-15.5 N                   [facility]  

 

          MCH       32.6 G/DL pg 32.0-36.5 N                   [facility]  

 

          MCHC      27.9 PG % 27.0-33.0 N                   [facility]  

 

          PLATELETS 236 10 10*3/mm3 150-450   N                   [facility]  

 

          RBC       4.59 10 10*6/mm3 4.00-5.40 N                   [facility]  

 

          RDW       13.8 %    11.5-14.5 N                   [facility]  

 

          WBC TOTAL 9.3       4.0-10.0  N                   [facility]

 [facility]  









                    ID                  Date                Data Source

 

                    [id number] 2020 11:53:00 AM EDT

 [facility]









          Name      Value     Range     Interpretation Code Description Data Kyara

rce(s) Supporting 

Document(s)

 

          BG FASTING 79 mg/dL      N                   [facility]

[facility]  









                    ID                  Date                Data Source

 

                    [id number]    2020 10:26:17 AM EDT [facility]

 

                                        Measurements & CalculationsHeight:      

       63 inches (5 ft. 3 in.) 160.02 cm

    Weight:             220 pounds 3 oz.    100.09 kg    Body Mass Index (BMI): 
 39.15BMI Interpretation:         ObeseBody Surface Area (BSA):    2.02Weight 
Management Education Done (Nutrition/Physical Activity)Vital SignsTemperature:  
      98.2FPulse Rate:         71 beats/minuteRespiratory Rate:   14 respirat
ions/minuteBlood Pressure:     131/87     O2 Saturation:  100%        Vital 
Signs performed by: Magaly Sanchez LPN,  May  4, 2020 10:28 AMVital Signs performed
 by: Magaly Sanchez LPN,  May  4, 2020 10:28 AMMultiple Vital SignsInitial BP: 
146/86Vitals #2BP: 131/87 (primary)Initial Intake Information From: patientRoom 
#: 12Infectious Disease / Travel ScreeningRecent travel for you or any close 
contacts? NoHave you had any close contact with anyone diagnosed with or under 
investigation for COVID-19 (coronavirus)? NoFever? NoRespiratory symptoms: 
cough, cold, congestion, shortness of breath, difficulty breathing? YesLoss of 
smell? NoLoss of taste? NoDetails: cough and cold syptoms , sinus 
congestionSmoking, Tobacco, Vaping or Smoke Exposure StatusSmoke Status: former 
smokerTobacco Use: NoDo you vape? NoPassive Smoke Exposure: NoMenstrual 
HistoryLast Menstrual Period (LMP): 2020Any possibility of pregnancy? 
NoComments: tubal Healthcare HistorySince your last office visit...Have you been
 admitted to the hospital? NoHave you been to an emergency room (ER) or urgent 
care clinic? NoHave you seen another healthcare  provider? NoHave you seen a 
dentist? NoIntake performed by:  Magaly Sanchez LPN,  May  4, 2020 10:31 AMRate 
Your HealthIn general, would you say your health is? GoodPain AssessmentAre you 
currently having any pain which...    You would like your provider to address? 
Yes    Affects your activity level? YesPain AssessmentPain ScaleNumeric Rating 
Scale: 6 / 10Location: headache Character/Quality: aching and throbbing. 
PoundingIs the pain radiating? NoScreening, Brief Intervention, & Referral to 
Treatment (SBIRT)Pre-Screening Questions How many times have you have 4 or more 
drinks in a day? 0How many times have you used an illegal drug or used a 
prescription medication for a non-medical reason? 0Performed by: Magaly Sanchez LPN,  May  4, 2020 10:33 AMPatient History Medical History:Diabetes - Type II 
diet controlledHeart Arrhythmia during pregnancy-cleared by Dr. Fuentes for 6 
months after pregnancySurgical History:Rt ankle reconstruction Cholecysectomy 
2009Tubal ligation 2014Family History:Diabetes (Mother)Cancer - 
Breast (Mother)Depression (Mother)Diabetes (Father)Hypertension 
(Father)Myocardial infarction (MI) (Father) p6Kyqebdl loss bilateralAcute memory
 lossDiabetes (Brother)Diabetes (Sister)Hypothyroidism (Sister)Brain Aneurysm 
(Maternal Grandmother)Diabetes (Paternal Grandmother)Social/Personal History: 
Chief ComplaintAllegies and Rash, not feeling well room 13History of Present 
Illness (HPI)44 YO female here for acute visit , C/O of headache ,sinus, body 
aches , nausea , exhaustion and nasal congession. . Pt is requesting out of work
 note. Pt states no loss of taste or smell. Pt denies feversPt states have been 
resting and trying to Stay hydrated. Pt states itchy rash bilateral legs and 
arms started about one week ago. Pt denies new medications. pt denies diat 
changes. Pt denies any new spaps , perfumes or detergents. Pt is present with 
plaques on elbows and knees. Pt states present for a very long time. Pt denies 
other concerns today. HPI performed by: Maryana LUGO,  May  4, 2020 
11:14 AMProblem ReviewProblem List was reviewed and/or updated during this 
visit.Medication Reconciliation & ReviewMedication List was reviewed and/or 
updated during this visit, including review of any over-the-counter medications,
 herbal therapies, and/or supplements.Allergy ReviewAllergy List was reviewed 
and/or updated during this visit.Adult Preventive CareProvider Calculated and 
Reviewed all Clinical Protocols for patient today. Labs/Meds/Other 
Counseling-Nutrition and Physical Activity:BMI Interpretation: Obese 
(2020)  Counseling: Done (2020)  Physical Activity: Done 
(2020)Review of Systems General: Denies loss of appetite, chills, dizzines
s, fatigue, fever, continued fever, headache, feeling ill, sweats, night sweats,
 sleep disturbances, weight loss.  Eyes: Denies blurring of vision, double 
vision, irritation, discharge, vision loss, eye pain, eye swelling, droopy 
eyelid, sensitivity to light, redness, itching.  Ears/Nose/Throat: Denies 
earache, ear discharge, ringing in ears, decreased hearing, nasal congestion, 
nosebleeds, runny nose, sore throat, hoarseness, difficulty swallowing, dry 
mouth, tooth pain, bleeding gums, swollen glands.  Cardiovascular: Denies chest 
pain, palpitations, feeling faint, trouble breathing w/exertion, SOB upon lying 
down, SOB at night, peripheral edema, elevated blood pressure, decreased heart 
rate.  Respiratory: Denies cough, difficulty breathing, shortness of breath, 
excessive sputum, coughing up blood, wheezing, chest pain.  Gastrointestinal: 
Denies nausea, vomiting, bleeding, burning, itching, irritation, cramps, 
diarrhea, constipation.  Genitourinary: Denies urinary incontinence, pain with 
urination, burning with urination, urinary frequency, urinary hesitancy, urinary
 urgency, urinary urgency at night, incomplete emptying, blood in urine.  
Musculoskeletal: Denies back pain, joint pain, leg pain, other pain-see 
comments, joint swelling, body aches, muscle aches, muscle cramps, muscle 
weakness, stiffness, recent injury.  Skin: Complains of rash, redness, itching, 
dryness.       arms aegs elbows kneesNeurologic: Denies muscle impairment, 
weakness, numbness/tingling, seizures, slurred speech, feeling faint, tremors, 
vertigo, paralysis on one side, paralysis on both sides.  Psychiatric: Denies de
pression, anxiety, memory loss, mental disturbance, suicidal ideation, homicidal
 ideation, hallucinations, paranoia, feeling stressed, hearing voices.  
Endocrine: Denies cold intolerance, heat intolerance, excessive thirst, 
excessive hunger, excessive urination, weight loss, weight gain.  Physical 
ExamGeneral Appearance: well nourished, well hydrated, no acute distressEyes, 
External: conjunctivae and lids normal, EOMINasal: nasal congession. sinus pain 
on palpation. Respiratory, Auscultation: clear to auscultation bilaterally; no 
rales, rhonchi, or wheezesRespiratory, Effort: no intercostal retractions or use
 of accessory musclesCardiovascular, Auscultation: S1, S2 audible; no murmur, 
rub, or gallop; RRRPeripheral Circulation: no clubbing, cyanosis, edema, or 
varicositiesAbdomen: soft, non-tender, no masses, bowel sounds normalGait & 
Station: normalSkin, Inspection: rash, arms and legs. plaqes to bilat knees and 
bilat elbows. Orientation: oriented to time, place, and personMood & Affect: no 
depression, anxiety, or agitationJudgment & Insight: intactRate Your HealthIn 
general, would you say your health is? GoodAssessment & Plan Problems:Added: 
Acute upper respiratory infection, unspecified (MXC38-W89.9)   Assessment:    
Instructions: We have sent a prescription to your pharmacy today. Please take 
medications as prescribed. Please report any major side effects. Please try to 
maintain adequate rest. good nutrition and adequate physical activities. Please 
try to maintain adequate intake of water daily. May take OTC tylenol as needed 
for headaches and body aches.Other seasonal allergic rhinitis (VVZ48-N21.2)   
Assessment:    Instructions: We have sent a prescription to your pharmacy 
today.Eruption (ICD-782.1) (NEL37-I40)   Assessment:    Instructions: We have 
sent a prescription to your pharmacy today. Referral made to derm, skin 
plaques.Assessment not Saved Eruption (MLK30-C29): Patient Instructions/Care 
Plan: Acute upper respiratory infection- unspecified: We have sent a 
prescription to your pharmacy today. Please take medications as prescribed. 
Please report any major side effects. Please try to maintain adequate rest. good
 nutrition and adequate physical activities. Please try to maintain adequate 
intake of water daily. May take OTC tylenol as needed for headaches and body 
aches.Other seasonal allergic rhinitis: We have sent a prescription to your 
pharmacy today.Eruption: We have sent a prescription to your pharmacy today. 
Referral made to derm, skin plaques.----------Plan developed in collaboration 
with patient and/or familyMedications:BETAMETHASONE DIPROPIONATE 0.05 % EXTERNAL
 OINTMENTCLARITIN 10 MG ORAL TABLETFLONASE ALLERGY RELIEF 50 MCG/ACT NASAL 
SUSPENSIONAZITHROMYCIN 250 MG ORAL TABLETMedication Changes:New 
Prescription:AZITHROMYCIN 250 MG ORAL TABLET-take two tablets by mouth on day 
one, then one tablet daily x 4 days Qty: 6[Tablet] Refills: 0     Method: 
ElectronicFLONASE ALLERGY RELIEF 50 MCG/ACT NASAL SUSPENSION-one spray to each 
nostril twice daily as needed Qty: 1[Container] Refills: 0     Method: 
ElectronicCLARITIN 10 MG ORAL TABLET-take one tablet by mouth daily Qty: 
30[Tablet] Refills: 1     Method: ElectronicBETAMETHASONE DIPROPIONATE 0.05 % 
EXTERNAL OINTMENT-apply to affected aniya twice daily as needed. arms and legs 
Qty: 2[Tube] Refills: 1     Method: ElectronicAllergies:PENICILLIN 
(Critical)Orders:Dermatology Consult [CPT-37474] COMP METABOLIC PANEL [CPT-
70093] CBC W/DIFF [CPT-71296] Adult - Ofc Vst, EST, Level IV [CPT-87201] Follow-
Up Return to clinic: as scheduled and as needed Clinical Visit Summary 
CompletedMedications:BETAMETHASONE DIPROPIONATE 0.05 % EXTERNAL OINTMENT 
(BETAMETHASONE DIPROPIONATE) apply to affected aniya twice daily as needed. arms 
and legs  #2[Tube] x 1    Route:EXTERNAL    Entered and Authorized by:  Sd SIFUENTES    Electronically signed by:   Maryana SIFUENTES on 
2020    Method used:    Electronically to             [facility] (retail)            [address] [place] [address]      
      Ph: [phone]            Fax: [phone]    Note to Pharmacy: 
Route: EXTERNAL;     Indications:    ERUPTION    RxID:   
[id number] 10 MG ORAL TABLET (LORATADINE) take one tablet by mouth
 daily  #30[Tablet] x 1    Route:ORAL    Entered and Authorized by:  Maryana SIFUENTES    Electronically signed by:   Maryana SIFUENTES on 2020  
  Method used:    Electronically to             [facility] (retail)  
          [address] [place] [address]            Ph: [phone]            Fax: [phone]    Note to Pharmacy: Route: ORAL;     
Indications:    OTHER SEASONAL ALLERGIC RHINITIS    RxID:   
[id number] ALLERGY RELIEF 50 MCG/ACT NASAL SUSPENSION (FLUTICASONE 
PROPIONATE) one spray to each nostril twice daily as needed  #1[Container] x 0  
  Route:NASAL    Entered and Authorized by:  Maryana SIFUENTES    
Electronically signed by:   Maryana SIFUENTES on 2020    Method used: 
   Electronically to             [facility] (retail)            
 [place] [address]            Ph: [phone]      
      Fax: [phone]    Note to Pharmacy: Route: NASAL;     Indications:   
 ACUTE UPPER RESPIRATORY INFECTION, UNSPECIFIED;OTHER SEASONAL ALLERGIC RHINITIS
    RxID:   [id number] 250 MG ORAL TABLET (AZITHROMYCIN) take 
two tablets by mouth on day one, then one tablet daily x 4 days  #6[Tablet] x 0 
   Route:ORAL    Entered and Authorized by:  Maryana SIFUENTES    
Electronically signed by:   Maryana SIFUENTES on 2020    Method used: 
   Electronically to             [facility] (retail)            [address]
 [place] [address]            Ph: [phone]      
      Fax: [phone]    Note to Pharmacy: Route: ORAL;     Indications:    
ACUTE UPPER RESPIRATORY INFECTION, UNSPECIFIED    RxID:   
[id number] signed by Maryana SIFUENTES on 2020 at 
1:38 PM________________________________________________________________________ 









          Name      Value     Range     Interpretation Code Description Data Kyara

rce(s) Supporting 

Document(s)

 

                                                                       









                    ID                  Date                Data Source

 

                    [id number]    2020 08:31:07 AM EDT [facility]

 

                                        Labs In-House Blood TestsDate/Time Colle

cted: 2020 8:31 AMTest        

    Result              Reference Range Normal ValueComments: blood draw done in
 offcie done in the right ac tolerated well Gilbert Foreman MA,  2020 
8:31 AMAssessment & Plan Orders:[id number] Vst-Est Level I [CPT-37200] 45290 - 
Venipuncture [CPT-78011] Electronically signed by Wilman Wilson DO on 2020 
at 12:13 
PM________________________________________________________________________ 









          Name      Value     Range     Interpretation Code Description Data Kyara

rce(s) Supporting 

Document(s)

 

                                                                       









                    ID                  Date                Data Source

 

                    [id number] 2020 08:25:00 AM EDT

 [facility]









          Name      Value     Range     Interpretation Code Description Data Kyara

rce(s) Supporting 

Document(s)

 

          HCT       39.6 %    36.0-47.0 N                   [facility]  

 

          HGB       12.9 g/dL 12.0-15.5 [facility]  

 

          MCH       32.6 G/DL pg 32.0-36.5 [facility]  

 

          MCHC      28.0 PG % 27.0-33.0 [facility]  

 

          PLATELETS 221 10 10*3/mm3 150-450   N                   [facility]  

 

          RBC       4.61 10 10*6/mm3 4.00-5.40 [facility]  

 

          RDW       13.9 %    11.5-14.5 [facility]  

 

          WBC TOTAL 7.5       4.0-10.0  N                   [facility]

 [facility]  









                    ID                  Date                Data Source

 

                    [id number] 2020 08:25:00 AM EDT

 [facility]









          Name      Value     Range     Interpretation Code Description Data Kyara

rce(s) Supporting 

Document(s)

 

          HGBA1C    5.9 %               N                   [facility]  









                    ID                  Date                Data Source

 

                    [id number] 2020 08:25:00 AM EDT

 [facility]









          Name      Value     Range     Interpretation Code Description Data Kyara

rce(s) Supporting 

Document(s)

 

          VIT D25 TOT 8.3 ng/mL 30.0-100.0 L                   [facility]

[facility]  

 

          BG FASTING 100 mg/dL     N                   [facility]

[facility]  

 

          TSH       2.170 microintl units/mL 0.358-3.740 N                   [facility]  









                    ID                  Date                Data Source

 

                    [id number]    2020 08:22:39 AM EDT [facility]

 

                                        Electronically signed by Wilman Wilson DO 

on 2020 at 8:23 

AM________________________________________________________________________ 









          Name      Value     Range     Interpretation Code Description Data Kyara

rce(s) Supporting 

Document(s)

 

                                                                       









                    ID                  Date                Data Source

 

                    [id number]    2020 02:53:57 PM EST [facility]

 

                                        Measurements & CalculationsHeight:      

       63 inches (5 ft. 3 in.) 160.02 cm

   Weight:             213 pounds 6 oz.    96.99 kg    Body Mass Index (BMI):  
37.94BMI Interpretation:         ObeseBody Surface Area (BSA):    1.99Weight 
Management Education Done (Nutrition/Physical Activity)Vital SignsTemperature:  
     98.4F                  oral Pulse Rate:         109 beats/minuteRespirat
ory Rate:   16 respirations/minuteBlood Pressure:     113/80      left arm      
     sitting         automaticO2 Saturation:  97%         room airVital Signs 
performed by: Lisa Francois LPN,  2020 3:07 PMVital Signs 
performed by: Don VARGHESE,  2020 3:18 PMInitial Intake 
Information from: patientRoom #: 9Infectious Disease- Travel Have you or your 
sexual partner travelled outside of the country recently? NoSmoking, Tobacco or 
Smoke Exposure StatusSmoke Status: former smokerTobacco Use: NoPassive Smoke 
Exposure: NoMenstrual HistoryLast Menstrual Period (LMP): 2020Any 
possibility of pregnancy? NoHealthcare HistorySince your last office 
visit...Have you been admitted to the hospital? NoHave you been to an emergency 
room (ER) or urgent care clinic? Yes - [facility]- Lower respiratory 
infectionEmergency room (ER) or urgent care date reported today:  2020Have
you seen another healthcare  provider? NoHave you seen a dentist? NoIntake 
performed by:  Lisa Francois LPN,  2020 2:58 PMRate Your HealthIn 
general, would you say your health is? Very GoodPain AssessmentAre you currently
having any pain which...    You would like your provider to address? No    
Affects your activity level? NoDepression Screening - PHQ-2Over the last two 
weeks, have you...    Had little interest or pleasure in doing things? Not at 
all    Been feeling down, depressed, or hopeless? Not at all PHQ-2 Score: 
0Anxiety Screening - FRANCISCA-2Over the last two weeks, have you been...    Feeling 
nervous, anxious, or on edge? Not at all    Unable to stop or control worrying? 
Not at all FRANCISCA-2 Score: 0Infectious Disease- Travel Cont. Any possibility of 
pregnancy? NoScreening, Brief Intervention, & Referral to Treatment (SBIRT)Pre-
Screening Questions How many times have you have 4 or more drinks in a day? 0How
many times have you used an illegal drug or used a prescription medication for a
non-medical reason? 0Performed by: Lisa Francois LPN,  2020 2:59 
PMPatient History Medical History:Diabetes - Type II diet controlledHeart 
Arrhythmia during pregnancy-cleared by Dr. Fuentes for 6 months after 
pregnancySurgical History:Rt ankle reconstruction Cholecysectomy 2009Tubal
ligation 2014Family History:Diabetes (Mother)Cancer - Breast 
(Mother)Depression (Mother)Diabetes (Father)Hypertension (Father)Myocardial 
infarction (MI) (Father) f0Sgemoue loss bilateralAcute memory lossDiabetes 
(Brother)Diabetes (Sister)Hypothyroidism (Sister)Brain Aneurysm (Maternal 
Grandmother)Diabetes (Paternal Grandmother)Social/Personal History: Smoking 
Status: former smokerChief ComplaintNew acute respiratory infection History of 
Present Illness (HPI)Pt is a 44 y/o male, presents for new patient acute.  
Previous PCP was Eder Shanks.  Pt was seen at a local urgent care, had a 
negative chest xray, was diagnosed with lower respiratory infection and advised 
supportive measures.  Pt feels symptoms are nearly resolved at this time.  Is 
not taking any medications.  HPI performed by: Don VARGHESE,  2020
3:18 PMTransitions of Care InboundProblem ReviewProblem List was reviewed and/or
updated during this visit.Medication Reconciliation & ReviewMedication List was 
reviewed and/or updated during this visit, including review of any 
over-the-counter medications, herbal therapies, and/or supplements.     Patient 
has no known medications.Allergy ReviewAllergy List was reviewed and/or updated 
during this visit.Adult Preventive CareProvider Calculated and Reviewed all 
Clinical Protocols for patient today. Labs/Meds/Other Counseling-Nutrition and 
Physical Activity:BMI Interpretation: Obese (2020)  Counseling: Done 
(2020)  Physical Activity: Done (2020)Cancer Screening  Pap 
Smear/HPV TestingReviewed: Today's Comments: 2019 @ Woman to woman Review of 
Systems General: Denies chills, dizziness, fatigue, fever, headache, feeling 
ill, sweats.  Ears/Nose/Throat: Denies earache, nasal congestion, sore throat, 
swollen glands.  Cardiovascular: Denies chest pain, palpitations, feeling faint.
 Respiratory: Denies cough, shortness of breath, excessive sputum, coughing up 
blood, wheezing.  Gastrointestinal: Denies nausea, vomiting, diarrhea, constipat
ion, pain or discomfort.  Neurologic: Denies weakness, feeling faint.  
Psychiatric: Denies depression, anxiety.  Physical ExamGeneral Appearance: well 
nourished, well hydrated, no acute distressEyes, External: conjunctivae and lids
normal, EOMIExternal Ears: normal, no lesions or deformitiesHearing: grossly 
intactOtoscopy: canals clear, tympanic membranes intact, no fluid, light reflex 
intact bilaterallyExternal Nose: normal, no lesions or deformitiesNasal: mucosa,
septum, and turbinates normal, nares patentLips/Teeth/Gums: normal dentition, no
gingival inflammation, no labial lesionsPharynx: tongue normal, posterior 
pharynx without erythema or exudate, no thrush/aphthous ulcerNeck: supple, no 
masses, trachea midline, full range of motion of neckRespiratory, Auscultation: 
clear to auscultation bilaterally; no rales, rhonchi, or wheezesRespiratory, 
Effort: no intercostal retractions or use of accessory musclesCardiovascular, 
Auscultation: S1, S2 audible; no murmur, rub, or gallop; RRRPeripheral C
irculation: no clubbing, cyanosis, edema, or varicositiesAbdomen: soft, non-
tender, no masses, bowel sounds normalGait & Station: normalSkin, Inspection: no
rashes, lesions, or ulcerationsOrientation: oriented to time, place, and 
personMood & Affect: no depression, anxiety, or agitationJudgment & Insight: 
intactCare Management Plan Transitions of CareInboundRate Your HealthIn general,
would you say your health is? Very GoodAssessment & Plan Problems:Added: Viral 
lower respiratory infection (ICD-519.8) (ZLC17-U69.9)   Assessment:    
Instructions: Resolving.  Supportive measures. This may continue for a total of 
7-10 days typically.  Recommend supportive measures, increase rest, plenty of 
fluids.  May use Zyrtec or Mucinex as needed.Type 2 diabetes mellitus without 
complications (WVI63-Q69.9)   Assessment:    Instructions: Return for annual PE.
 Please complete a release of information form to obtain medical records from 
your prior providers(s).Obesity (ICD-278.00) (RHT91-X15.09)   Assessment:    
Instructions: As above.BMI 37.0-37.9 (ICD-V85.37) (MQP02-H14.37)   Assessment:  
 Instructions: As above.Patient Instructions/Care Plan: Viral lower respiratory 
infection: Resolving.  Supportive measures. This may continue for a total of 7-
10 days typically.  Recommend supportive measures, increase rest, plenty of 
fluids.  May use Zyrtec or Mucinex as needed.Type 2 diabetes mellitus without 
complications: Return for annual PE.  Please complete a release of information 
form to obtain medical records from your prior providers(s).Obesity: As 
above.BMI 37.0-37.9: As above.----------Plan developed in collaboration with 
patient and/or familyAllergies:PENICILLIN (Critical)Orders:Adult - Ofc Vst, NEW,
Level III [CPT-06029] Follow-Up Return to clinic: as needed for preventive care 
visitAdditional Follow-Up: annual PEClinical Visit Summary 
CompletedElectronically signed by Don VARGHESE on 2020 at 1:08 
PM________________________________________________________________________ 









          Name      Value     Range     Interpretation Code Description Data Kyara

rce(s) Supporting 

Document(s)

 

                                                                       







                                        Procedure

 

                                          



                                                                                
                                                                                
                                                                                
                                                                                
                                            



Social History

          



           Code       Duration   Value      Status     Description Data Source(s

)

 

             Smoking      2020 12:00:00 AM EST Patient is a former smoker 

completed    Patient 

is a former smoker                      [facility] ([facility])



                                                                                
                 



Vital Signs

          



                    ID                  Date                Data Source

 

                    UNK                                      









           Name       Value      Range      Interpretation Code Description Data

 Source(s)

 

           Body mass index (BMI) [Ratio] 40.2 kg/m2                       40.2 k

g/m2 [facility] ([facility])

 

           Body height 63 [in_i]                        63 [in_i]  [facility] ([facility])

 

                                        5'3" 

 

           Body weight 227.00 [lb_av]                       227.00 [lb_av] MEDEN

T ([facility])

 

           Diastolic blood pressure--sitting 90 mm[Hg]                        90

 mm[Hg]  [facility] ([facility]

 

                                        Omron, large cuff/Ra 

 

           Systolic blood pressure--sitting 128 mm[Hg]                       128

 mm[Hg] [facility] ([facility])

 

                                        Omron, large cuff/Ra 

 

           Heart rate 83 /min                          83 /min    [facility] ([facility]

 

           Diastolic blood pressure--sitting 98 mm[Hg]                        98

 mm[Hg]  [facility] ([facility]

 

                                        Omron, large cuff/Ra 

 

           Systolic blood pressure--sitting 123 mm[Hg]                       123

 mm[Hg] [facility] ([facility]

 

                                        Omron, large cuff/Ra 

 

           Heart rate 84 /min                          84 /min    [facility] ([facility])

 

           Body mass index (BMI) [Ratio] 39.7 kg/m2                       39.7 k

g/m2 [facility] ([facility]

 

           Body height 63 [in_i]                        63 [in_i]  JAQUELINE ([facility]

[facility])

 

                                        5'3" 

 

           Body weight 224.00 [lb_av]                       224.00 [lb_av] SHALONDA

T ([facility])

 

           Body weight 3632 [oz_av]                       3632 [oz_av] DARIUS ([facility])

 

           Systolic blood pressure 97 mm[Hg]                        97 mm[Hg]  A

[facility] ([facility])

 

           Body height 63 [in_i]                        63 [in_i]  [facility] ([facility])

 

           Diastolic blood pressure 61 mm[Hg]                        61 mm[Hg]  

[facility] ([facility])

 

           Body weight 3632 [oz_av]                       3632 [oz_av] [facility] ([facility])

 

           Systolic blood pressure 97 mm[Hg]                        97 mm[Hg]  A

[facility] ([facility])

 

           Body height 63 [in_i]                        63 [in_i]  [facility] ([facility])

 

           Diastolic blood pressure 61 mm[Hg]                        61 mm[Hg]  

[facility] ([facility])

 

           Body weight 3632 [oz_av]                       3632 [oz_av] [facility] ([facility])

 

           Systolic blood pressure 97 mm[Hg]                        97 mm[facility]Hg]  [facility]

 

           Body height 63 [in_i]                        63 [in_i]  [facility] ([facility])

 

           Diastolic blood pressure 61 mm[Hg]                        61 mm[Hg]  

[facility] ([facility])

 

           Body weight 3554.08 [oz_av]                       3554.08 [oz_av] ATH

KATIE ([facility])

 

           Systolic blood pressure 125 mm[Hg]                       125 mm[Hg] A

[facility] ([facility])

 

           Body height 63 [in_i]                        63 [in_i]  [facility] ([facility])

 

           Diastolic blood pressure 84 mm[Hg]                        84 mm[Hg]  

[facility] ([facility])

 

           Body weight 3554.08 [oz_av]                       3554.08 [oz_av] ATH

KATIE ([facility])

 

           Systolic blood pressure 125 mm[Hg]                       125 mm[Hg] A

[facility]

 

           Body height 63 [in_i]                        63 [in_i]  [facility] ([facility])

 

           Diastolic blood pressure 84 mm[Hg]                        84 mm[Hg]  

[facility] ([facility])

 

           Body weight 3554.08 [oz_av]                       3554.08 [oz_av] ATH

K[facility]IE ([facility])

 

           Systolic blood pressure 125 mm[Hg]                       125 mm[Hg] A

[facility] ([facility])

 

           Body height 63 [in_i]                        63 [in_i]  [facility] ([facility])

 

           Diastolic blood pressure 84 mm[Hg]                        84 mm[Hg]  

[facility] ([facility])

 

           Body weight 3523.04 [oz_av]                       3523.04 [oz_av] ATH

KATIE ([facility])

 

           Systolic blood pressure 131 mm[Hg]                       131 mm[Hg] [facility]

[facility]

 

           Body height 63 [in_i]                        63 [in_i]  [facility] ([facility])

 

           Diastolic blood pressure 87 mm[Hg]                        87 mm[Hg]  

[facility] ([facility])

 

           Body weight 3523.04 [oz_av]                       3523.04 [oz_av] ATH

KATIE ([facility])

 

           Systolic blood pressure 131 mm[Hg]                       131 mm[Hg] A

[facility]

 

           Body height 63 [in_i]                        63 [in_i]  [facility] ([facility])

 

           Diastolic blood pressure 87 mm[Hg]                        87 mm[Hg]  

[facility] ([facility])

 

           Body weight 3523.04 [oz_av]                       3523.04 [oz_av] ATH

[facility] ([facility])

 

           Systolic blood pressure 131 mm[Hg]                       131 mm[Hg] A

[facility]

 

           Body height 63 [in_i]                        63 [in_i]  [facility] ([facility])

 

           Diastolic blood pressure 87 mm[Hg]                        87 mm[Hg]  

[facility] ([facility])

 

           Body weight 3414.08 [oz_av]                       3414.08 [oz_av] ATH

KATIE ([facility])

 

           Systolic blood pressure 113 mm[Hg]                       113 mm[Hg] A

[facility] ([facility])

 

           Body height 63 [in_i]                        63 [in_i]  [facility] ([facility])

 

           Diastolic blood pressure 80 mm[Hg]                        80 mm[Hg]  

[facility] ([facility])

 

           Body weight 3414.08 [oz_av]                       3414.08 [oz_av] ATH

KATIE ([facility])

 

           Systolic blood pressure 113 mm[Hg]                       113 mm[Hg] A

[facility] ([facility])

 

           Body height 63 [in_i]                        63 [in_i]  [facility] ([facility])

 

           Diastolic blood pressure 80 mm[Hg]                        80 mm[Hg]  

DAIRUS ([facility])

 

           Body weight 3414.08 [oz_av]                       3414.08 [oz_av] ATH

[facility] ([facility])

 

           Systolic blood pressure 113 mm[Hg]                       113 mm[Hg] A

[facility]

 

           Body height 63 [in_i]                        63 [in_i]  [facility] ([facility])

 

           Diastolic blood pressure 80 mm[Hg]                        80 mm[Hg]  

[facility] ([facility])

## 2021-02-01 NOTE — CCD
Continuity of Care Document (CCD)

                             Created on: 2021



ShaliniDanna medellin

External Reference #: [id number]

: 1975

Sex: Female



Demographics





                          Address                   [address] [place] [address]

 

                          Home Phone                [phone]

 

                          Preferred Language        Unknown

 

                          Marital Status            Unknown

 

                          Mosque Affiliation     Unknown

 

                          Race                      White

 

                          Ethnic Group              Declined to Specify/Unknown





Author





                          Author                    [facility] Danna German

 

                          Organization              Unknown

 

                          Address                   [address] [place] [address], [facility]

[place]  [address]



 

                          Phone                     [phone]







Care Team Providers





                    Care Team Member Name Role                Phone

 

                    Maryana Jarrett MARIA AUTM                [phone]

 

                    Ali, Mohsin MD      AUTM                [phone]







Problems





                    Active Problems     Provider            Date

 

                    Chest pain          Earnest Reyes MD Onset: 10/28/2020

 

                    Obesity             Earnest Reyes MD Onset: 10/28/2020

 

                    Type 2 diabetes mellitus Earnest Reyes MD Onset: 10/28/2

020

 

                    Dietary management surveillance Earnest Reyes MD Onset: 

10/28/2020

 

                    Electrocardiogram abnormal Earnest Reyes MD Onset: 10/28

/2020

 

                          Elevated blood-pressure reading without diagnosis of h

ypertension Earnest Reyes MD                             Onset: 10/28/2020

 

                    Snoring             Earnest Reyes MD Onset: 10/28/2020

 

                    Abnormal results of cardiovascular function studies Earnest Reyes MD Onset: 

2020

 

                    Palpitations        Earnest Reyes MD Onset: 2020

 

                    Morbid obesity      Earnest Reyes MD Onset: 2020

 

                    Essential hypertension Earnest Reyes MD Onset: 

0







Social History





                Type            Date            Description     Comments

 

                Birth Sex                       Unknown          

 

                ETOH Use                        Occasionally consumes alcohol  

 

                Tobacco Use     Start: Unknown End: Unknown Patient is a former 

smoker started at 

age 17, at most 1/2 ppd, quit at age 33 

 

                Smoking Status  Reviewed: 20 Patient is a former smoker st

arted at age 17, 

at most 1/2 ppd, quit at age 33 

 

                    Exercise Type/Frequency                     Fully active: Ab

le to carry on all performance without

restriction                             on feet all day at work 

 

                Exercise Limitations                 None             







Allergies, Adverse Reactions, Alerts





             Active Allergies Reaction     Severity     Comments     Date

 

             Penicillins                            anaphylactic shock 

9

 

             Latex                                               2015







Medications





           Active Medications SIG        Qnty       Indications Ordering Provide

r Date

 

                          Losartan Potassium                     25mg Tablets   

                1/2 tab by

mouth every morning 30tabs          I10             Earnest Reyes MD 

020

 

                          Doxycycline Hyclate                     100mg Capsules

                   1 by 

mouth twice a day                                 Unknown         2020

 

             Loratadine                     10mg Tablets                   1 Maryana Sandoval FNP                 10/27/2020

 

                          Vitamin D                     25mcg (1000 Ut) Tablets 

                  1 by 

mouth every day                                 Unknown         10/27/2020







Immunizations





                                        Description

 

                                        No Information Available







Vital Signs





                Date            Vital           Result          Comment

 

                2020  8:36am Weight          227.00 lb        

 

                    Height              63 inches           5'3"

 

                    BMI (Body Mass Index) 40.2 kg/m2           

 

                    Heart Rate          83 /min              

 

                    BP Systolic Sitting 128 mmHg            Omron, large cuff/Ra

 

                    BP Diastolic Sitting 90 mmHg             Omron, large cuff/R

a

 

                10/28/2020  9:06am Weight          224.00 lb        

 

                    Height              63 inches           5'3"

 

                    BMI (Body Mass Index) 39.7 kg/m2           

 

                    Heart Rate          84 /min              

 

                    BP Systolic Sitting 123 mmHg            Omron, large cuff/Ra

 

                    BP Diastolic Sitting 98 mmHg             Omron, large cuff/R

a







Results





        Test    Acquired Date Facility Test    Result  H/L     Range   Note

 

                    CBC without Differential 10/11/2020          [facility]

e

           (315)-   - White Blood Count 10.4                  4.3-10.9    

 

             Red Blood Count 4.39         Low          4.70-6.20     

 

             Platelets    241                       130-400       

 

             Hemoglobin   11.4         Low          13.0-17.0     

 

             Hematocrit   36.0         Low          39.0-50.0     

 

                    BMP                 10/11/2020          [facility]

           (315)-   - Calcium Ser/Plasma Mass/Vol 5.0                           

    

 

             Sodium       140                                     

 

             Carbon Dioxide Ser/Plasm 27.0                                    

 

             Chloride Serum/Plasma 99                                      

 

             Potassium    4.0                                     

 

             Glucose      185          High                 

 

             Blood Urea Nitrogen 9                         5-21          

 

             Creatinine   0.7                       0.6-1.5       

 

             G F R        --                                      







Procedures





                Date            Code            Description     Status

 

                2020      18042           Treadmill/Pharmacological Monito

ring Completed

 

                    2020          80086               Ambulatory Blood Pre

ssure 

Monitoring;Recording,Scanning,Inter,RPT Completed

 

                2020      95558           Echocardiogram 2-D Doppler Color

 Completed

 

                10/28/2020      89680           ECG 12-Lead     Completed







Medical Devices





                                        Description

 

                                        No Information Available







Encounters





           Type       Date       Location   Provider   Dx         Diagnosis

 

           Office Visit 2020  9:00a Main Office Earnest Reyes MD I10  

      Essential 

(primary) hypertension

 

                          R07.9                     Chest pain, unspecified

 

                          E66.01                    Morbid (severe) obesity due 

to excess calories

 

                          R00.2                     Palpitations

 

                          R94.39                    Abnormal result of other car

diovascular function study

 

                          R06.83                    Snoring

 

           Office Visit 10/28/2020  8:45a Main Office Earnest Reyes MD R07.9

      Chest 

pain, unspecified

 

                          R94.31                    Abnormal electrocardiogram [

ECG] [EKG]

 

                          R03.0                     Elevated blood-pressure read

ing, w/o diagnosis of htn

 

                          E66.09                    Other obesity due to excess 

calories

 

                          R06.83                    Snoring

 

                          Z71.3                     Dietary counseling and surve

illance







Assessments





                Date            Code            Description     Provider

 

                2020      I10             Essential (primary) hypertension

 Earnest Reyes MD

 

                2020      R07.9           Chest pain, unspecified Earnest Reyes MD

 

                2020      E66.01          Morbid (severe) obesity due to e

xcess calories Earnest Reyes MD

 

                2020      R00.2           Palpitations    Earnest Reyes MD

 

                2020      R94.39          Abnormal result of other cardiov

ascular function study Earnest Reyes MD

 

                2020      R06.83          Snoring         Earnest Reyes MD

 

                2020      I10             Essential (primary) hypertension

 Earnest Reyes MD

 

                2020      R07.9           Chest pain, unspecified Stress N

uclear/Reg Treadmill

 

                    2020          R03.0               Elevated blood-press

ure reading, without diagnosis of 

hypertension                            Holter/Event/Telemetry

 

                2020      R94.31          Abnormal electrocardiogram [ECG]

 [EKG] ECHO

 

                10/28/2020      R07.9           Chest pain, unspecified Earnest Reyes MD

 

                10/28/2020      R94.31          Abnormal electrocardiogram [ECG]

 [EKG] Earnest Reyes MD

 

                    10/28/2020          R03.0               Elevated blood-press

ure reading, without diagnosis of 

hypertension                            Earnest Reyes MD

 

                10/28/2020      E66.09          Other obesity due to excess terrell

rio Earnest Reyes MD

 

                10/28/2020      R06.83          Snoring         Earnest Reyes MD

 

                10/28/2020      Z71.3           Dietary counseling and surveilla

nce Earnest Reyse MD







Plan of Treatment

Future Appointment(s):* 2021  8:00 am - Earnest Reyes MD at Main 
  Office

* 2021  3:30 pm - Holter/Event/Telemetry at Main Office

2020 - Earnest Reyes MD* I10 Essential (primary) hypertension* New 
  Medication:* Losartan Potassium 25 mg - 1/2 tab by mouth every morning



* Recommendations:* Losartan 12.5 mg every morning was prescribed.  Home/Self BP
   monitor [HBPM]:  Patient was encouraged to purchase an automatic digital arm 
  home BP monitor with a large adult BP cuff.  Patient was encouraged to obtain 
  home BPs twice a day (early morning & late evening), every day for 7 days 
  prior to each scheduled office visit with this office or with you.  Patient 
  was encouraged to keep a  BP diary which is to be presented at each visit.  
  Sample 7 day home BP logs were provided. Seven-day BP logs are to be obtained 
  at least every 3 months.  Patient was given written instructions on the 
  technique of obtaining home/self BPs.





* R07.9 Chest pain, unspecified* Recommendations:* Further evaluation with a 
  pharmacologic SPECT myocardial perfusion imaging study.





* E66.01 Morbid (severe) obesity due to excess calories* Recommendations:* PCRM 
  power plate: Consume food from the following 4 food groups: Fruits, vegetab
  les, intact whole grains, beans & legumes.  Avoid animal products.  Avoid 
  processed foods.  Patient was strongly warned against following a ketogenic 
  (low carbohydrate) diet because of the adverse long-term consequences.  I 
  suggested she consider NOOM.





* R00.2 Palpitations* New Orders:* Holter Monitor, Scheduled: 21



* Recommendations:* 24-hour Holter monitor





* R94.39 Abnormal result of other cardiovascular function study

* R06.83 Snoring* Referral:* Ali, Mohsin, MD, Internal Medicine



* Recommendations:* Patient was referred to sleep specialist Dr. Mohsin Ali for 
  further evaluation and management.





* All * Follow up:* Clinic visit in 6 weeks.









Functional Status





                Functional Condition Comment         Date            Status

 

                Independent with all ADL's                                 Activ

e







Mental Status





                                        Description

 

                                        No Information Available







Referrals





                Refer to      Reason for Referral Status          Appt Date

 

                Earnest Reyes MD [facility] AUTH EMR-EXPIRES 21. [place]  Created

         

 

                                        [address] [address]

 

                                        [phone]

 

                                          

 

                          Ali, Mohsin, MD           Please evaluate & manage sno

ring, somnolence, abnormal home 

sleep study.  Thanks!!    Scheduled                 

 

                                        [facility]

 

                                        [address] [address]

 

                                        [phone]

 

                                          

 

                Earnest Reyes MD echo auth emr-expires 21. [place]  Created  

       

 

                                        [address] [id number] [phone]

## 2021-02-01 NOTE — CCD
Continuity of Care Document (CCD)

                             Created on: 2021



Danna Loya

External Reference #: [id number]

: 1975

Sex: Female



Demographics





                          Address                   [address] [place] [address]  [address]

 

                          Home Phone                [phone]

 

                          Preferred Language        Unknown

 

                          Marital Status            Unknown

 

                          Religion Affiliation     Unknown

 

                          Race                      White

 

                          Ethnic Group              Not  or 





Author





                          Author                    Danna WALL M.D.

 

                          Organization              Unknown

 

                          Address                   [address] [place] [address]  [address]



 

                          Phone                     [phone]







Care Team Providers





                    Care Team Member Name Role                Phone

 

                    Maryana Jarrett MARIA AUTM                [phone]

 

                    Earnest Reyes M.D. AUTM                [phone]







Problems





                    Active Problems     Provider            Date

 

                    Obstructive sleep apnea syndrome Mohsin Ali, M.D.    Onset: 

2021

 

                    Malaise and fatigue Mohsin Ali, M.D.    Onset: 2021

 

                    Hypersomnia         Mohsin Ali, M.D.    Onset: 2021







Social History





                Type            Date            Description     Comments

 

                Birth Sex                       Unknown          







Allergies, Adverse Reactions, Alerts





                                        Description

 

                                        No Information Available







Medications





                                        Description

 

                                        No Active Medications







Immunizations





                                        Description

 

                                        No Information Available







Vital Signs





                Date            Vital           Result          Comment

 

                10/03/2013  8:29am BP Systolic     120 mmHg         

 

                    BP Diastolic        70 mmHg              

 

                    Heart Rate          88 /min              

 

                    Respiratory Rate    12 /min              

 

                2013  8:17am BP Systolic     120 mmHg         

 

                    BP Diastolic        76 mmHg              

 

                    Heart Rate          80 /min              







Results





                                        Description

 

                                        No Information Available







Procedures





                                        Description

 

                                        No Information Available







Medical Devices





                                        Description

 

                                        No Information Available







Encounters





           Type       Date       Location   Provider   Dx         Diagnosis

 

           Office Visit 2021  9:00a Main office - [place] Mohsin Ali, M. D. G47.33     

Obstructive sleep apnea (adult) (pediatric)

 

                          R53.83                    Other fatigue

 

                          G47.14                    Hypersomnia due to medical c

ondition







Assessments





                Date            Code            Description     Provider

 

                2021      G47.33          Obstructive sleep apnea (adult) 

(pediatric) Mohsin Ali, M.D.

 

                2021      R53.83          Other fatigue   Mohsin Ali, M.D.

 

                2021      G47.14          Hypersomnia due to medical condi

tion Mohsin Ali, M.D.







Plan of Treatment

Future Appointment(s):* 2021 10:45 am - Mohsin Ali, M.D. at Main office - 
  [place]

10/03/2013 - Hollie Schimdt P.A.-C.* 346.20 Migraine Variants W/O Intractable 
  W/O Status Migrainosus* Comments:* She was advised to use only Tylenol if 
  needed for headache. She will call if they become uncontrolled.



* Follow up:* 3 months





* 780.4 Dizziness & Giddiness* Comments:* Occurs with migraine.





* 368.8 Visual Disturbances Other Spec* Comments:* Occurs with migraine.





* 724.2 Lumbago* Comments:* Controlled. Follow up with PCP.





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

* V22.2 Pregnant State Incidental Normal* Comments:* Follow up with Dr Del Valle.





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









Functional Status





                                        Description

 

                                        No Information Available







Mental Status





                                        Description

 

                                        No Information Available







Referrals





                                        Description

 

                                        No Information Available

## 2021-04-09 ENCOUNTER — HOSPITAL ENCOUNTER (EMERGENCY)
Dept: HOSPITAL 53 - M ED | Age: 46
Discharge: HOME | End: 2021-04-09
Payer: COMMERCIAL

## 2021-04-09 VITALS — BODY MASS INDEX: 40.51 KG/M2 | HEIGHT: 63 IN | WEIGHT: 228.62 LBS

## 2021-04-09 VITALS — SYSTOLIC BLOOD PRESSURE: 122 MMHG | DIASTOLIC BLOOD PRESSURE: 58 MMHG

## 2021-04-09 DIAGNOSIS — I10: ICD-10-CM

## 2021-04-09 DIAGNOSIS — R55: Primary | ICD-10-CM

## 2021-04-09 DIAGNOSIS — Z88.0: ICD-10-CM

## 2021-04-09 DIAGNOSIS — I51.9: ICD-10-CM

## 2021-04-09 DIAGNOSIS — Z91.040: ICD-10-CM

## 2021-04-09 DIAGNOSIS — Z79.899: ICD-10-CM

## 2021-04-09 LAB
BASOPHILS # BLD AUTO: 0 10^3/UL (ref 0–0.2)
BASOPHILS NFR BLD AUTO: 0.3 % (ref 0–1)
CK MB CFR.DF SERPL CALC: 1.85
CK MB SERPL-MCNC: < 1 NG/ML (ref ?–3.6)
CK SERPL-CCNC: 54 U/L (ref 26–192)
EOSINOPHIL # BLD AUTO: 0 10^3/UL (ref 0–0.5)
EOSINOPHIL NFR BLD AUTO: 0.4 % (ref 0–3)
HCT VFR BLD AUTO: 37.5 % (ref 36–47)
HGB BLD-MCNC: 12 G/DL (ref 12–15.5)
LYMPHOCYTES # BLD AUTO: 2.3 10^3/UL (ref 1.5–5)
LYMPHOCYTES NFR BLD AUTO: 24 % (ref 24–44)
MCH RBC QN AUTO: 27 PG (ref 27–33)
MCHC RBC AUTO-ENTMCNC: 32 G/DL (ref 32–36.5)
MCV RBC AUTO: 84.3 FL (ref 80–96)
MONOCYTES # BLD AUTO: 0.6 10^3/UL (ref 0–0.8)
MONOCYTES NFR BLD AUTO: 6.3 % (ref 2–8)
NEUTROPHILS # BLD AUTO: 6.5 10^3/UL (ref 1.5–8.5)
NEUTROPHILS NFR BLD AUTO: 68.7 % (ref 36–66)
PLATELET # BLD AUTO: 215 10^3/UL (ref 150–450)
RBC # BLD AUTO: 4.45 10^6/UL (ref 4–5.4)
TROPONIN I SERPL-MCNC: < 0.02 NG/ML (ref ?–0.1)
WBC # BLD AUTO: 9.4 10^3/UL (ref 4–10)

## 2021-04-09 NOTE — REP
INDICATION:

syncope.



COMPARISON:

None.



TECHNIQUE:

Axial CT images with multiplanar reformations.



FINDINGS:

No acute bleed or acute large vessel territorial infarct.  Ventricles, cisterns and

sulci are within normal limits.  No mass effect or midline shift.  No abnormal fluid

collections.



Paranasal sinuses and mastoid air cells are clear.



IMPRESSION:

No acute findings.





<Electronically signed by Salvador Butler > 04/09/21 [id number]

## 2021-04-09 NOTE — ECGEPIP
[facility] - ED

                                       

                                       Test Date:    2021

Pat Name:     LAYTON LOWERY           Department:   

Patient ID:   [id number]                 Room:         -

Gender:       Female                   Technician:   

:          1975               Requested By: Carson LUI

Order Number: [id number]     Reading MD:   Carson Hummel

                                 Measurements

Intervals                              Axis          

Rate:         78                       P:            48

AZ:           176                      QRS:          -10

QRSD:         76                       T:            29

QT:           394                                    

QTc:          449                                    

                           Interpretive Statements

Normal sinus rhythm

Minimal voltage criteria for LVH, may be normal variant ( R in aVL )

POOR R WAVE PROGRESSION

RATE CHANGE COMPARED TO 10/11/20

Electronically Signed on 2021 21:11:22 EDT by Carson Hummel

## 2021-08-02 ENCOUNTER — HOSPITAL ENCOUNTER (OUTPATIENT)
Dept: HOSPITAL 53 - M PLALAB | Age: 46
End: 2021-08-02
Payer: COMMERCIAL

## 2021-08-02 DIAGNOSIS — M17.12: Primary | ICD-10-CM

## 2021-08-02 LAB
CRP SERPL-MCNC: 1.65 MG/DL (ref 0–0.3)
RHEUMATOID FACT SERPL-ACNC: < 10 IU/ML (ref ?–15)
URATE SERPL-MCNC: 4.6 MG/DL (ref 2.6–6)

## 2021-08-03 LAB
B BURGDOR IGG+IGM SER-ACNC: <0.91 ISR (ref 0–0.9)
B BURGDOR IGM SER IA-ACNC: <0.8 INDEX (ref 0–0.79)
DSDNA AB SER-ACNC: 21 IU/ML (ref 0–9)
ENA RNP AB SER-ACNC: <0.2 AI (ref 0–0.9)
ENA SM AB SER-ACNC: <0.2 AI (ref 0–0.9)
ENA SS-A AB SER-ACNC: <0.2 AI (ref 0–0.9)
ENA SS-B AB SER-ACNC: <0.2 AI (ref 0–0.9)

## 2021-08-19 ENCOUNTER — HOSPITAL ENCOUNTER (OUTPATIENT)
Dept: HOSPITAL 53 - M PLALAB | Age: 46
End: 2021-08-19
Attending: STUDENT IN AN ORGANIZED HEALTH CARE EDUCATION/TRAINING PROGRAM
Payer: COMMERCIAL

## 2021-08-19 DIAGNOSIS — R00.2: Primary | ICD-10-CM

## 2021-08-19 LAB
BUN SERPL-MCNC: 9 MG/DL (ref 7–18)
CALCIUM SERPL-MCNC: 9.2 MG/DL (ref 8.5–10.1)
CHLORIDE SERPL-SCNC: 106 MEQ/L (ref 98–107)
CHOLEST SERPL-MCNC: 153 MG/DL (ref ?–200)
CHOLEST/HDLC SERPL: 3.19 {RATIO} (ref ?–5)
CO2 SERPL-SCNC: 30 MEQ/L (ref 21–32)
CREAT SERPL-MCNC: 0.67 MG/DL (ref 0.55–1.3)
GFR SERPL CREATININE-BSD FRML MDRD: > 60 ML/MIN/{1.73_M2} (ref 58–?)
GLUCOSE SERPL-MCNC: 87 MG/DL (ref 70–100)
HDLC SERPL-MCNC: 48 MG/DL (ref 40–?)
LDLC SERPL CALC-MCNC: 84 MG/DL (ref ?–100)
MAGNESIUM SERPL-MCNC: 2.1 MG/DL (ref 1.8–2.4)
NONHDLC SERPL-MCNC: 105 MG/DL
POTASSIUM SERPL-SCNC: 4.3 MEQ/L (ref 3.5–5.1)
SODIUM SERPL-SCNC: 139 MEQ/L (ref 136–145)
T4 FREE SERPL-MCNC: 1.05 NG/DL (ref 0.76–1.46)
TRIGL SERPL-MCNC: 104 MG/DL (ref ?–150)
TSH SERPL DL<=0.005 MIU/L-ACNC: 1.51 UIU/ML (ref 0.36–3.74)

## 2022-03-17 ENCOUNTER — HOSPITAL ENCOUNTER (OUTPATIENT)
Dept: HOSPITAL 53 - M SFHCRHEU | Age: 47
End: 2022-03-17
Attending: INTERNAL MEDICINE
Payer: COMMERCIAL

## 2022-03-17 DIAGNOSIS — R70.0: ICD-10-CM

## 2022-03-17 DIAGNOSIS — B99.9: ICD-10-CM

## 2022-03-17 DIAGNOSIS — R76.8: Primary | ICD-10-CM

## 2022-03-17 DIAGNOSIS — R79.82: ICD-10-CM

## 2022-03-17 DIAGNOSIS — M79.10: ICD-10-CM

## 2022-03-17 LAB
25(OH)D3 SERPL-MCNC: 18.2 NG/ML (ref 30–100)
BASOPHILS # BLD AUTO: 0 10^3/UL (ref 0–0.2)
BASOPHILS NFR BLD AUTO: 0.3 % (ref 0–1)
C3 SERPL-MCNC: 150 MG/DL (ref 90–180)
C4 SERPL-MCNC: 30 MG/DL (ref 10–40)
CRP SERPL-MCNC: 1.34 MG/DL (ref 0–0.3)
EOSINOPHIL # BLD AUTO: 0.1 10^3/UL (ref 0–0.5)
EOSINOPHIL NFR BLD AUTO: 0.6 % (ref 0–3)
ERYTHROCYTE [SEDIMENTATION RATE] IN BLOOD BY WESTERGREN METHOD: 44 MM/HR (ref 0–20)
HCT VFR BLD AUTO: 38 % (ref 36–47)
HGB BLD-MCNC: 12.2 G/DL (ref 12–15.5)
IGA SERPL-MCNC: 211 MG/DL (ref 70–400)
IGG SERPL-MCNC: 1760 MG/DL (ref 681–1648)
IGM SERPL-MCNC: 108 MG/DL (ref 40–230)
IRON SERPL-MCNC: 34 UG/DL (ref 50–170)
LYMPHOCYTES # BLD AUTO: 2.6 10^3/UL (ref 1.5–5)
LYMPHOCYTES NFR BLD AUTO: 24.7 % (ref 24–44)
MAGNESIUM SERPL-MCNC: 2.1 MG/DL (ref 1.8–2.4)
MCH RBC QN AUTO: 27.2 PG (ref 27–33)
MCHC RBC AUTO-ENTMCNC: 32.1 G/DL (ref 32–36.5)
MCV RBC AUTO: 84.6 FL (ref 80–96)
MONOCYTES # BLD AUTO: 0.6 10^3/UL (ref 0–0.8)
MONOCYTES NFR BLD AUTO: 5.9 % (ref 2–8)
NEUTROPHILS # BLD AUTO: 7.2 10^3/UL (ref 1.5–8.5)
NEUTROPHILS NFR BLD AUTO: 68.2 % (ref 36–66)
PHOSPHATE SERPL-MCNC: 3.8 MG/DL (ref 2.5–4.9)
PLATELET # BLD AUTO: 242 10^3/UL (ref 150–450)
PROT SERPL-MCNC: 8.1 GM/DL (ref 6.4–8.2)
RBC # BLD AUTO: 4.49 10^6/UL (ref 4–5.4)
VIT B12 SERPL-MCNC: 436 PG/ML (ref 247–911)
WBC # BLD AUTO: 10.5 10^3/UL (ref 4–10)

## 2022-04-06 ENCOUNTER — HOSPITAL ENCOUNTER (OUTPATIENT)
Dept: HOSPITAL 53 - M PLALAB | Age: 47
End: 2022-04-06
Attending: INTERNAL MEDICINE
Payer: COMMERCIAL

## 2022-04-06 DIAGNOSIS — R76.8: Primary | ICD-10-CM

## 2022-04-06 LAB
APPEARANCE UR: (no result)
BACTERIA UR QL AUTO: NEGATIVE
BILIRUB UR QL STRIP.AUTO: NEGATIVE
CREAT UR-MCNC: 159 MG/DL
GLUCOSE UR QL STRIP.AUTO: NEGATIVE MG/DL
HGB UR QL STRIP.AUTO: NEGATIVE
KETONES UR QL STRIP.AUTO: (no result) MG/DL
LEUKOCYTE ESTERASE UR QL STRIP.AUTO: (no result)
MUCOUS THREADS URNS QL MICRO: (no result)
NITRITE UR QL STRIP.AUTO: NEGATIVE
PH UR STRIP.AUTO: 5 UNITS (ref 5–9)
PROT UR QL STRIP.AUTO: NEGATIVE MG/DL
PROT UR-MCNC: 12.7 MG/DL (ref 0–12)
RBC # UR AUTO: 2 /HPF (ref 0–3)
SP GR UR STRIP.AUTO: 1.02 (ref 1–1.03)
SQUAMOUS #/AREA URNS AUTO: 8 /HPF (ref 0–6)
UROBILINOGEN UR QL STRIP.AUTO: 0.2 MG/DL (ref 0–2)
WBC #/AREA URNS AUTO: 4 /HPF (ref 0–3)

## 2022-04-27 ENCOUNTER — HOSPITAL ENCOUNTER (OUTPATIENT)
Dept: HOSPITAL 53 - M PLAIMG | Age: 47
End: 2022-04-27
Attending: INTERNAL MEDICINE
Payer: COMMERCIAL

## 2022-04-27 DIAGNOSIS — M23.92: Primary | ICD-10-CM

## 2022-06-30 ENCOUNTER — HOSPITAL ENCOUNTER (OUTPATIENT)
Dept: HOSPITAL 53 - M WHC | Age: 47
End: 2022-06-30
Attending: NURSE PRACTITIONER
Payer: COMMERCIAL

## 2022-06-30 DIAGNOSIS — Z80.3: ICD-10-CM

## 2022-06-30 DIAGNOSIS — Z12.31: Primary | ICD-10-CM

## 2022-07-18 ENCOUNTER — HOSPITAL ENCOUNTER (EMERGENCY)
Dept: HOSPITAL 53 - M ED | Age: 47
Discharge: HOME | End: 2022-07-18
Payer: COMMERCIAL

## 2022-07-18 VITALS — DIASTOLIC BLOOD PRESSURE: 81 MMHG | SYSTOLIC BLOOD PRESSURE: 134 MMHG

## 2022-07-18 VITALS — HEIGHT: 63 IN | WEIGHT: 224.47 LBS | BODY MASS INDEX: 39.77 KG/M2

## 2022-07-18 DIAGNOSIS — R55: Primary | ICD-10-CM

## 2022-07-18 DIAGNOSIS — Z79.899: ICD-10-CM

## 2022-07-18 DIAGNOSIS — Z88.0: ICD-10-CM

## 2022-07-18 DIAGNOSIS — Z91.040: ICD-10-CM

## 2022-07-18 DIAGNOSIS — I51.9: ICD-10-CM

## 2022-07-18 DIAGNOSIS — I10: ICD-10-CM

## 2022-07-18 DIAGNOSIS — F17.200: ICD-10-CM

## 2022-07-18 LAB
AMPHETAMINES UR QL SCN: NEGATIVE
BARBITURATES UR QL SCN: NEGATIVE
BASOPHILS # BLD AUTO: 0 10^3/UL (ref 0–0.2)
BASOPHILS NFR BLD AUTO: 0.2 % (ref 0–1)
BENZODIAZ UR QL SCN: NEGATIVE
BUN SERPL-MCNC: 8 MG/DL (ref 7–18)
BZE UR QL SCN: NEGATIVE
CALCIUM SERPL-MCNC: 8.6 MG/DL (ref 8.5–10.1)
CANNABINOIDS UR QL SCN: NEGATIVE
CHLORIDE SERPL-SCNC: 112 MEQ/L (ref 98–107)
CK MB CFR.DF SERPL CALC: 1.59
CK MB SERPL-MCNC: < 1 NG/ML (ref ?–3.6)
CK SERPL-CCNC: 63 U/L (ref 26–192)
CO2 SERPL-SCNC: 25 MEQ/L (ref 21–32)
CREAT SERPL-MCNC: 0.67 MG/DL (ref 0.55–1.3)
EOSINOPHIL # BLD AUTO: 0.1 10^3/UL (ref 0–0.5)
EOSINOPHIL NFR BLD AUTO: 0.7 % (ref 0–3)
ETHANOL SERPL-MCNC: 0 % (ref 0–0.01)
GFR SERPL CREATININE-BSD FRML MDRD: > 60 ML/MIN/{1.73_M2} (ref 58–?)
GLUCOSE SERPL-MCNC: 116 MG/DL (ref 70–100)
HCT VFR BLD AUTO: 36.4 % (ref 36–47)
HGB BLD-MCNC: 12.1 G/DL (ref 12–15.5)
LYMPHOCYTES # BLD AUTO: 2.2 10^3/UL (ref 1.5–5)
LYMPHOCYTES NFR BLD AUTO: 24.9 % (ref 24–44)
MCH RBC QN AUTO: 28.2 PG (ref 27–33)
MCHC RBC AUTO-ENTMCNC: 33.2 G/DL (ref 32–36.5)
MCV RBC AUTO: 84.8 FL (ref 80–96)
METHADONE UR QL SCN: NEGATIVE
MONOCYTES # BLD AUTO: 0.5 10^3/UL (ref 0–0.8)
MONOCYTES NFR BLD AUTO: 5.6 % (ref 2–8)
NEUTROPHILS # BLD AUTO: 5.9 10^3/UL (ref 1.5–8.5)
NEUTROPHILS NFR BLD AUTO: 68 % (ref 36–66)
OPIATES UR QL SCN: NEGATIVE
PCP UR QL SCN: NEGATIVE
PLATELET # BLD AUTO: 224 10^3/UL (ref 150–450)
POTASSIUM SERPL-SCNC: 3.9 MEQ/L (ref 3.5–5.1)
RBC # BLD AUTO: 4.29 10^6/UL (ref 4–5.4)
RSV RNA NPH QL NAA+PROBE: NEGATIVE
SODIUM SERPL-SCNC: 143 MEQ/L (ref 136–145)
TSH SERPL DL<=0.005 MIU/L-ACNC: 1.92 UIU/ML (ref 0.36–3.74)
WBC # BLD AUTO: 8.7 10^3/UL (ref 4–10)

## 2022-07-18 PROCEDURE — 93005 ELECTROCARDIOGRAM TRACING: CPT

## 2022-07-18 PROCEDURE — 71045 X-RAY EXAM CHEST 1 VIEW: CPT

## 2022-07-18 PROCEDURE — 93041 RHYTHM ECG TRACING: CPT

## 2022-07-18 PROCEDURE — 99285 EMERGENCY DEPT VISIT HI MDM: CPT

## 2022-07-18 PROCEDURE — 87631 RESP VIRUS 3-5 TARGETS: CPT

## 2022-07-18 PROCEDURE — 80048 BASIC METABOLIC PNL TOTAL CA: CPT

## 2022-07-18 PROCEDURE — 84443 ASSAY THYROID STIM HORMONE: CPT

## 2022-07-18 PROCEDURE — 82553 CREATINE MB FRACTION: CPT

## 2022-07-18 PROCEDURE — 96374 THER/PROPH/DIAG INJ IV PUSH: CPT

## 2022-07-18 PROCEDURE — 82550 ASSAY OF CK (CPK): CPT

## 2022-07-18 PROCEDURE — 94760 N-INVAS EAR/PLS OXIMETRY 1: CPT

## 2022-07-18 PROCEDURE — 70450 CT HEAD/BRAIN W/O DYE: CPT

## 2022-07-18 PROCEDURE — 80307 DRUG TEST PRSMV CHEM ANLYZR: CPT

## 2022-07-18 PROCEDURE — 85025 COMPLETE CBC W/AUTO DIFF WBC: CPT

## 2022-07-18 PROCEDURE — 82077 ASSAY SPEC XCP UR&BREATH IA: CPT

## 2022-10-06 ENCOUNTER — HOSPITAL ENCOUNTER (OUTPATIENT)
Dept: HOSPITAL 53 - M WHC | Age: 47
End: 2022-10-06
Attending: STUDENT IN AN ORGANIZED HEALTH CARE EDUCATION/TRAINING PROGRAM
Payer: COMMERCIAL

## 2022-10-06 DIAGNOSIS — R55: Primary | ICD-10-CM

## 2022-10-30 ENCOUNTER — HOSPITAL ENCOUNTER (OUTPATIENT)
Dept: HOSPITAL 53 - M LABSMTC | Age: 47
End: 2022-10-30
Attending: ANESTHESIOLOGY
Payer: COMMERCIAL

## 2022-10-30 DIAGNOSIS — Z20.822: ICD-10-CM

## 2022-10-30 DIAGNOSIS — Z01.812: Primary | ICD-10-CM

## 2022-11-02 ENCOUNTER — HOSPITAL ENCOUNTER (OUTPATIENT)
Dept: HOSPITAL 53 - M OPP | Age: 47
Discharge: HOME | End: 2022-11-02
Attending: SURGERY
Payer: COMMERCIAL

## 2022-11-02 VITALS — BODY MASS INDEX: 39.16 KG/M2 | WEIGHT: 221 LBS | HEIGHT: 63 IN

## 2022-11-02 VITALS — DIASTOLIC BLOOD PRESSURE: 85 MMHG | SYSTOLIC BLOOD PRESSURE: 163 MMHG

## 2022-11-02 DIAGNOSIS — Z79.899: ICD-10-CM

## 2022-11-02 DIAGNOSIS — Z91.040: ICD-10-CM

## 2022-11-02 DIAGNOSIS — Z12.11: Primary | ICD-10-CM

## 2022-11-02 DIAGNOSIS — K64.9: ICD-10-CM

## 2022-11-02 DIAGNOSIS — F32.9: ICD-10-CM

## 2022-11-02 DIAGNOSIS — I10: ICD-10-CM

## 2022-11-02 DIAGNOSIS — Z88.0: ICD-10-CM

## 2022-11-02 DIAGNOSIS — Z80.0: ICD-10-CM

## 2022-11-02 DIAGNOSIS — Z79.1: ICD-10-CM

## 2022-11-02 DIAGNOSIS — Z87.891: ICD-10-CM

## 2022-11-02 DIAGNOSIS — G47.30: ICD-10-CM

## 2022-11-02 DIAGNOSIS — F41.9: ICD-10-CM

## 2022-12-13 ENCOUNTER — HOSPITAL ENCOUNTER (OUTPATIENT)
Dept: HOSPITAL 53 - M SFHCRHEU | Age: 47
End: 2022-12-13
Attending: INTERNAL MEDICINE
Payer: COMMERCIAL

## 2022-12-13 DIAGNOSIS — R70.0: ICD-10-CM

## 2022-12-13 DIAGNOSIS — E61.1: ICD-10-CM

## 2022-12-13 DIAGNOSIS — R79.82: Primary | ICD-10-CM

## 2022-12-13 DIAGNOSIS — E55.9: ICD-10-CM

## 2022-12-13 LAB
25(OH)D3 SERPL-MCNC: 26.1 NG/ML (ref 20–100)
CRP SERPL-MCNC: 1.5 MG/DL (ref ?–1)
IRON SERPL-MCNC: 43 UG/DL (ref 50–170)

## 2023-06-12 ENCOUNTER — HOSPITAL ENCOUNTER (OUTPATIENT)
Dept: HOSPITAL 53 - M SFHCRHEU | Age: 48
End: 2023-06-12
Attending: INTERNAL MEDICINE
Payer: COMMERCIAL

## 2023-06-12 DIAGNOSIS — R76.8: ICD-10-CM

## 2023-06-12 DIAGNOSIS — R79.82: ICD-10-CM

## 2023-06-12 DIAGNOSIS — R70.0: ICD-10-CM

## 2023-06-12 DIAGNOSIS — E55.9: Primary | ICD-10-CM

## 2023-06-12 DIAGNOSIS — E61.1: ICD-10-CM

## 2023-06-12 LAB
25(OH)D3 SERPL-MCNC: 14.4 NG/ML (ref 20–100)
CRP SERPL-MCNC: 1.5 MG/DL (ref ?–1)
IRON SERPL-MCNC: 42 UG/DL (ref 50–170)

## 2023-07-26 ENCOUNTER — HOSPITAL ENCOUNTER (OUTPATIENT)
Dept: HOSPITAL 53 - M PLAIMG | Age: 48
End: 2023-07-26
Attending: INTERNAL MEDICINE

## 2023-07-26 DIAGNOSIS — M54.50: Primary | ICD-10-CM

## 2023-08-14 ENCOUNTER — HOSPITAL ENCOUNTER (OUTPATIENT)
Dept: HOSPITAL 53 - M WUC | Age: 48
End: 2023-08-14
Attending: STUDENT IN AN ORGANIZED HEALTH CARE EDUCATION/TRAINING PROGRAM
Payer: COMMERCIAL

## 2023-08-14 DIAGNOSIS — M61.471: Primary | ICD-10-CM

## 2023-10-05 ENCOUNTER — HOSPITAL ENCOUNTER (OUTPATIENT)
Dept: HOSPITAL 53 - M SOG | Age: 48
End: 2023-10-05
Attending: ORTHOPAEDIC SURGERY
Payer: COMMERCIAL

## 2023-10-05 DIAGNOSIS — M25.571: Primary | ICD-10-CM

## 2023-10-05 DIAGNOSIS — M79.89: ICD-10-CM

## 2023-10-18 ENCOUNTER — HOSPITAL ENCOUNTER (OUTPATIENT)
Dept: HOSPITAL 53 - M PLAIMG | Age: 48
End: 2023-10-18
Attending: ORTHOPAEDIC SURGERY
Payer: COMMERCIAL
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
## 2023-10-18 DIAGNOSIS — S93.431A: Primary | ICD-10-CM

## 2023-10-18 DIAGNOSIS — M25.571: ICD-10-CM
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
## 2023-10-18 DIAGNOSIS — M19.071: ICD-10-CM

## 2023-10-18 DIAGNOSIS — M25.471: ICD-10-CM

## 2023-11-22 ENCOUNTER — HOSPITAL ENCOUNTER (OUTPATIENT)
Dept: HOSPITAL 53 - M LAB | Age: 48
End: 2023-11-22
Attending: INTERNAL MEDICINE
Payer: COMMERCIAL

## 2023-11-22 DIAGNOSIS — E55.9: ICD-10-CM

## 2023-11-22 DIAGNOSIS — E61.1: Primary | ICD-10-CM

## 2023-11-22 LAB
25(OH)D3 SERPL-MCNC: 22.4 NG/ML (ref 20–100)
IRON SERPL-MCNC: 46 UG/DL (ref 50–170)

## 2024-02-01 ENCOUNTER — HOSPITAL ENCOUNTER (OUTPATIENT)
Dept: HOSPITAL 53 - M LAB REF | Age: 49
End: 2024-02-01
Attending: PHYSICIAN ASSISTANT
Payer: COMMERCIAL

## 2024-02-01 DIAGNOSIS — M25.50: ICD-10-CM

## 2024-02-01 DIAGNOSIS — E55.9: Primary | ICD-10-CM

## 2024-02-01 DIAGNOSIS — Z11.9: ICD-10-CM

## 2024-02-01 DIAGNOSIS — E61.1: ICD-10-CM

## 2024-02-01 DIAGNOSIS — E66.9: ICD-10-CM

## 2024-02-01 LAB
25(OH)D3 SERPL-MCNC: 40.8 NG/ML (ref 20–100)
ALBUMIN SERPL BCG-MCNC: 3.7 G/DL (ref 3.2–5.2)
ALP SERPL-CCNC: 91 U/L (ref 46–116)
ALT SERPL W P-5'-P-CCNC: 17 U/L (ref 7–40)
AST SERPL-CCNC: 16 U/L (ref ?–34)
BASOPHILS # BLD AUTO: 0 10^3/UL (ref 0–0.2)
BASOPHILS NFR BLD AUTO: 0.3 % (ref 0–1)
BILIRUB SERPL-MCNC: 0.3 MG/DL (ref 0.3–1.2)
BUN SERPL-MCNC: 10 MG/DL (ref 9–23)
CALCIUM SERPL-MCNC: 9 MG/DL (ref 8.5–10.1)
CHLORIDE SERPL-SCNC: 104 MMOL/L (ref 98–107)
CHOLEST SERPL-MCNC: 161 MG/DL (ref ?–200)
CHOLEST/HDLC SERPL: 3 {RATIO} (ref ?–5)
CO2 SERPL-SCNC: 28 MMOL/L (ref 20–31)
CREAT SERPL-MCNC: 0.58 MG/DL (ref 0.55–1.3)
EOSINOPHIL # BLD AUTO: 0 10^3/UL (ref 0–0.5)
EOSINOPHIL NFR BLD AUTO: 0.2 % (ref 0–3)
EST. AVERAGE GLUCOSE BLD GHB EST-MCNC: 108 MG/DL (ref 60–110)
GFR SERPL CREATININE-BSD FRML MDRD: > 60 ML/MIN/{1.73_M2} (ref 58–?)
GLUCOSE SERPL-MCNC: 89 MG/DL (ref 60–100)
HCT VFR BLD AUTO: 41.4 % (ref 36–47)
HCV AB SER QL: 0.04 INDEX (ref ?–0.8)
HDLC SERPL-MCNC: 53.5 MG/DL (ref 40–?)
HGB BLD-MCNC: 13.7 G/DL (ref 12–15.5)
HIV 1+2 AB+HIV1 P24 AG SERPL QL IA: NEGATIVE
LDLC SERPL CALC-MCNC: 88.9 MG/DL (ref ?–100)
LYMPHOCYTES # BLD AUTO: 2.7 10^3/UL (ref 1.5–5)
LYMPHOCYTES NFR BLD AUTO: 25.4 % (ref 24–44)
MAGNESIUM SERPL-MCNC: 1.9 MG/DL (ref 1.8–2.4)
MCH RBC QN AUTO: 28.6 PG (ref 27–33)
MCHC RBC AUTO-ENTMCNC: 33.1 G/DL (ref 32–36.5)
MCV RBC AUTO: 86.4 FL (ref 80–96)
MONOCYTES # BLD AUTO: 0.5 10^3/UL (ref 0–0.8)
MONOCYTES NFR BLD AUTO: 4.8 % (ref 2–8)
NEUTROPHILS # BLD AUTO: 7.4 10^3/UL (ref 1.5–8.5)
NEUTROPHILS NFR BLD AUTO: 69 % (ref 36–66)
NONHDLC SERPL-MCNC: 107.5 MG/DL
PLATELET # BLD AUTO: 118 10^3/UL (ref 150–450)
POTASSIUM SERPL-SCNC: 5 MMOL/L (ref 3.5–5.1)
PROT SERPL-MCNC: 8.1 G/DL (ref 5.7–8.2)
RBC # BLD AUTO: 4.79 10^6/UL (ref 4–5.4)
SODIUM SERPL-SCNC: 137 MMOL/L (ref 136–145)
TRIGL SERPL-MCNC: 93 MG/DL (ref ?–150)
TSH SERPL DL<=0.005 MIU/L-ACNC: 2.45 UIU/ML (ref 0.55–4.78)
WBC # BLD AUTO: 10.7 10^3/UL (ref 4–10)

## 2024-02-23 ENCOUNTER — HOSPITAL ENCOUNTER (OUTPATIENT)
Dept: HOSPITAL 53 - M WHC | Age: 49
End: 2024-02-23
Attending: PHYSICIAN ASSISTANT
Payer: COMMERCIAL

## 2024-02-23 DIAGNOSIS — Z12.31: Primary | ICD-10-CM

## 2024-03-13 ENCOUNTER — HOSPITAL ENCOUNTER (OUTPATIENT)
Dept: HOSPITAL 53 - M LAB REF | Age: 49
End: 2024-03-13
Attending: PHYSICIAN ASSISTANT
Payer: COMMERCIAL

## 2024-03-13 DIAGNOSIS — Z12.4: Primary | ICD-10-CM

## 2024-04-02 ENCOUNTER — HOSPITAL ENCOUNTER (OUTPATIENT)
Dept: HOSPITAL 53 - M PLALAB | Age: 49
End: 2024-04-02
Attending: INTERNAL MEDICINE
Payer: COMMERCIAL

## 2024-04-02 DIAGNOSIS — E55.9: Primary | ICD-10-CM

## 2024-04-02 DIAGNOSIS — E61.1: ICD-10-CM

## 2024-04-02 LAB
25(OH)D3 SERPL-MCNC: 38.7 NG/ML (ref 20–100)
IRON SERPL-MCNC: 79 UG/DL (ref 50–170)

## 2024-04-13 ENCOUNTER — HOSPITAL ENCOUNTER (EMERGENCY)
Dept: HOSPITAL 53 - M ED | Age: 49
Discharge: HOME | End: 2024-04-13
Payer: COMMERCIAL

## 2024-04-13 VITALS — DIASTOLIC BLOOD PRESSURE: 82 MMHG | TEMPERATURE: 96.7 F | SYSTOLIC BLOOD PRESSURE: 161 MMHG | OXYGEN SATURATION: 100 %

## 2024-04-13 VITALS — BODY MASS INDEX: 38.59 KG/M2 | WEIGHT: 217.82 LBS | HEIGHT: 63 IN

## 2024-04-13 DIAGNOSIS — Z91.040: ICD-10-CM

## 2024-04-13 DIAGNOSIS — D50.9: ICD-10-CM

## 2024-04-13 DIAGNOSIS — Z79.899: ICD-10-CM

## 2024-04-13 DIAGNOSIS — E55.9: ICD-10-CM

## 2024-04-13 DIAGNOSIS — I10: ICD-10-CM

## 2024-04-13 DIAGNOSIS — G43.909: Primary | ICD-10-CM

## 2024-04-13 DIAGNOSIS — Z88.0: ICD-10-CM

## 2024-04-13 DIAGNOSIS — Z87.891: ICD-10-CM

## 2024-04-13 DIAGNOSIS — E11.9: ICD-10-CM

## 2024-04-13 LAB
ALBUMIN SERPL BCG-MCNC: 3.6 G/DL (ref 3.2–5.2)
ALP SERPL-CCNC: 100 U/L (ref 46–116)
ALT SERPL W P-5'-P-CCNC: 18 U/L (ref 7–40)
AST SERPL-CCNC: 20 U/L (ref ?–34)
BASOPHILS # BLD AUTO: 0 10^3/UL (ref 0–0.2)
BASOPHILS NFR BLD AUTO: 0.3 % (ref 0–1)
BILIRUB SERPL-MCNC: 0.4 MG/DL (ref 0.3–1.2)
BUN SERPL-MCNC: 7 MG/DL (ref 9–23)
CALCIUM SERPL-MCNC: 9.2 MG/DL (ref 8.5–10.1)
CHLORIDE SERPL-SCNC: 107 MMOL/L (ref 98–107)
CO2 SERPL-SCNC: 27 MMOL/L (ref 20–31)
CREAT SERPL-MCNC: 0.58 MG/DL (ref 0.55–1.3)
CRP SERPL-MCNC: 1.6 MG/DL (ref ?–1)
EOSINOPHIL # BLD AUTO: 0 10^3/UL (ref 0–0.5)
EOSINOPHIL NFR BLD AUTO: 0.3 % (ref 0–3)
ERYTHROCYTE [SEDIMENTATION RATE] IN BLOOD BY WESTERGREN METHOD: 51 MM/HR (ref 0–20)
GFR SERPL CREATININE-BSD FRML MDRD: > 60 ML/MIN/{1.73_M2} (ref 58–?)
GLUCOSE SERPL-MCNC: 101 MG/DL (ref 60–100)
HCT VFR BLD AUTO: 42.6 % (ref 36–47)
HGB BLD-MCNC: 14.2 G/DL (ref 12–15.5)
LYMPHOCYTES # BLD AUTO: 2.2 10^3/UL (ref 1.5–5)
LYMPHOCYTES NFR BLD AUTO: 25.6 % (ref 24–44)
MAGNESIUM SERPL-MCNC: 2 MG/DL (ref 1.8–2.4)
MCH RBC QN AUTO: 28.7 PG (ref 27–33)
MCHC RBC AUTO-ENTMCNC: 33.3 G/DL (ref 32–36.5)
MCV RBC AUTO: 86.2 FL (ref 80–96)
MONOCYTES # BLD AUTO: 0.4 10^3/UL (ref 0–0.8)
MONOCYTES NFR BLD AUTO: 4.4 % (ref 2–8)
NEUTROPHILS # BLD AUTO: 6 10^3/UL (ref 1.5–8.5)
NEUTROPHILS NFR BLD AUTO: 69.2 % (ref 36–66)
PLATELET # BLD AUTO: 192 10^3/UL (ref 150–450)
POTASSIUM SERPL-SCNC: 4.8 MMOL/L (ref 3.5–5.1)
PROT SERPL-MCNC: 8 G/DL (ref 5.7–8.2)
RBC # BLD AUTO: 4.94 10^6/UL (ref 4–5.4)
SODIUM SERPL-SCNC: 140 MMOL/L (ref 136–145)
WBC # BLD AUTO: 8.6 10^3/UL (ref 4–10)

## 2024-04-13 PROCEDURE — 96361 HYDRATE IV INFUSION ADD-ON: CPT

## 2024-04-13 PROCEDURE — 86140 C-REACTIVE PROTEIN: CPT

## 2024-04-13 PROCEDURE — 85652 RBC SED RATE AUTOMATED: CPT

## 2024-04-13 PROCEDURE — 96375 TX/PRO/DX INJ NEW DRUG ADDON: CPT

## 2024-04-13 PROCEDURE — 85025 COMPLETE CBC W/AUTO DIFF WBC: CPT

## 2024-04-13 PROCEDURE — 96374 THER/PROPH/DIAG INJ IV PUSH: CPT

## 2024-04-13 PROCEDURE — 70450 CT HEAD/BRAIN W/O DYE: CPT

## 2024-04-13 PROCEDURE — 83735 ASSAY OF MAGNESIUM: CPT

## 2024-04-13 PROCEDURE — 99284 EMERGENCY DEPT VISIT MOD MDM: CPT

## 2024-04-13 PROCEDURE — 80053 COMPREHEN METABOLIC PANEL: CPT

## 2024-04-13 RX ADMIN — SODIUM CHLORIDE ONE MLS/HR: 9 INJECTION, SOLUTION INTRAVENOUS at 13:44

## 2024-04-13 RX ADMIN — LOSARTAN POTASSIUM ONE MG: 25 TABLET, FILM COATED ORAL at 13:43

## 2024-04-13 RX ADMIN — KETOROLAC TROMETHAMINE ONE MG: 30 INJECTION, SOLUTION INTRAMUSCULAR at 13:44

## 2024-04-13 RX ADMIN — METOCLOPRAMIDE ONE MG: 5 INJECTION, SOLUTION INTRAMUSCULAR; INTRAVENOUS at 13:44

## 2024-04-23 ENCOUNTER — HOSPITAL ENCOUNTER (OUTPATIENT)
Dept: HOSPITAL 53 - M LAB REF | Age: 49
End: 2024-04-23
Attending: PHYSICIAN ASSISTANT
Payer: COMMERCIAL

## 2024-04-23 DIAGNOSIS — R51.9: ICD-10-CM

## 2024-04-23 DIAGNOSIS — I10: Primary | ICD-10-CM

## 2024-04-23 LAB
CREAT UR-MCNC: 44.3 MG/DL
MICROALBUMIN UR-MCNC: < 3 MG/L
MICROALBUMIN/CREAT UR: 6.7 MCG/MG (ref 0–30)

## 2024-05-28 ENCOUNTER — HOSPITAL ENCOUNTER (OUTPATIENT)
Dept: HOSPITAL 53 - M PLAIMG | Age: 49
End: 2024-05-28
Attending: PHYSICIAN ASSISTANT
Payer: COMMERCIAL

## 2024-05-28 DIAGNOSIS — H53.8: Primary | ICD-10-CM

## 2024-05-28 DIAGNOSIS — H70.13: ICD-10-CM

## 2024-08-26 ENCOUNTER — HOSPITAL ENCOUNTER (OUTPATIENT)
Dept: HOSPITAL 53 - M RAD | Age: 49
End: 2024-08-26
Attending: PHYSICIAN ASSISTANT
Payer: COMMERCIAL

## 2024-08-26 DIAGNOSIS — R05.3: Primary | ICD-10-CM

## 2024-08-28 ENCOUNTER — HOSPITAL ENCOUNTER (OUTPATIENT)
Dept: HOSPITAL 53 - M CARPUL | Age: 49
End: 2024-08-28
Attending: PHYSICIAN ASSISTANT
Payer: COMMERCIAL

## 2024-08-28 DIAGNOSIS — R05.3: Primary | ICD-10-CM

## 2024-12-03 ENCOUNTER — HOSPITAL ENCOUNTER (OUTPATIENT)
Dept: HOSPITAL 53 - M SLEEP HO | Age: 49
End: 2024-12-03
Attending: INTERNAL MEDICINE
Payer: COMMERCIAL

## 2024-12-03 DIAGNOSIS — R06.83: Primary | ICD-10-CM

## 2025-02-08 ENCOUNTER — HOSPITAL ENCOUNTER (OUTPATIENT)
Dept: HOSPITAL 53 - M LAB | Age: 50
End: 2025-02-08
Attending: NURSE PRACTITIONER
Payer: COMMERCIAL

## 2025-02-08 DIAGNOSIS — I10: Primary | ICD-10-CM

## 2025-02-08 DIAGNOSIS — Z13.220: ICD-10-CM

## 2025-02-08 LAB
ALBUMIN SERPL BCG-MCNC: 3.8 G/DL (ref 3.2–5.2)
ALP SERPL-CCNC: 85 U/L (ref 35–104)
ALT SERPL W P-5'-P-CCNC: 15 U/L (ref 7–40)
AST SERPL-CCNC: 11 U/L (ref ?–34)
BASOPHILS # BLD AUTO: 0 10^3/UL (ref 0–0.2)
BASOPHILS NFR BLD AUTO: 0.3 % (ref 0–1)
BILIRUB SERPL-MCNC: 0.4 MG/DL (ref 0.3–1.2)
BUN SERPL-MCNC: 17 MG/DL (ref 9–23)
CALCIUM SERPL-MCNC: 9.1 MG/DL (ref 8.5–10.1)
CHLORIDE SERPL-SCNC: 100 MMOL/L (ref 98–107)
CHOLEST SERPL-MCNC: 135 MG/DL (ref ?–200)
CHOLEST/HDLC SERPL: 2.76 {RATIO} (ref ?–5)
CO2 SERPL-SCNC: 31 MMOL/L (ref 20–31)
CREAT SERPL-MCNC: 0.6 MG/DL (ref 0.55–1.3)
EOSINOPHIL # BLD AUTO: 0 10^3/UL (ref 0–0.5)
EOSINOPHIL NFR BLD AUTO: 0.5 % (ref 0–3)
GFR SERPL CREATININE-BSD FRML MDRD: > 60 ML/MIN/{1.73_M2} (ref 58–?)
GLUCOSE SERPL-MCNC: 99 MG/DL (ref 60–100)
HCT VFR BLD AUTO: 42.9 % (ref 36–47)
HDLC SERPL-MCNC: 48.8 MG/DL (ref 40–?)
HGB BLD-MCNC: 13.7 G/DL (ref 12–15.5)
LDLC SERPL CALC-MCNC: 67.6 MG/DL (ref ?–100)
LYMPHOCYTES # BLD AUTO: 3.1 10^3/UL (ref 1.5–5)
LYMPHOCYTES NFR BLD AUTO: 35.1 % (ref 24–44)
MAGNESIUM SERPL-MCNC: 1.8 MG/DL (ref 1.8–2.4)
MCH RBC QN AUTO: 27.4 PG (ref 27–33)
MCHC RBC AUTO-ENTMCNC: 31.9 G/DL (ref 32–36.5)
MCV RBC AUTO: 85.8 FL (ref 80–96)
MONOCYTES # BLD AUTO: 0.4 10^3/UL (ref 0–0.8)
MONOCYTES NFR BLD AUTO: 5 % (ref 2–8)
NEUTROPHILS # BLD AUTO: 5.1 10^3/UL (ref 1.5–8.5)
NEUTROPHILS NFR BLD AUTO: 58.6 % (ref 36–66)
NONHDLC SERPL-MCNC: 86.2 MG/DL
PLATELET # BLD AUTO: 235 10^3/UL (ref 150–450)
POTASSIUM SERPL-SCNC: 4.1 MMOL/L (ref 3.5–5.1)
PROT SERPL-MCNC: 8.1 G/DL (ref 5.7–8.2)
RBC # BLD AUTO: 5 10^6/UL (ref 4–5.4)
SODIUM SERPL-SCNC: 141 MMOL/L (ref 136–145)
TRIGL SERPL-MCNC: 93 MG/DL (ref ?–150)
WBC # BLD AUTO: 8.7 10^3/UL (ref 4–10)

## 2025-04-15 ENCOUNTER — HOSPITAL ENCOUNTER (OUTPATIENT)
Dept: HOSPITAL 53 - M LAB REF | Age: 50
End: 2025-04-15
Attending: PHYSICIAN ASSISTANT
Payer: COMMERCIAL

## 2025-04-15 DIAGNOSIS — Z12.4: Primary | ICD-10-CM

## 2025-05-05 ENCOUNTER — HOSPITAL ENCOUNTER (OUTPATIENT)
Dept: HOSPITAL 53 - M WHC | Age: 50
End: 2025-05-05
Attending: PHYSICIAN ASSISTANT
Payer: COMMERCIAL

## 2025-05-05 DIAGNOSIS — N88.8: Primary | ICD-10-CM

## 2025-05-21 ENCOUNTER — HOSPITAL ENCOUNTER (OUTPATIENT)
Dept: HOSPITAL 53 - M PLAIMG | Age: 50
End: 2025-05-21
Attending: INTERNAL MEDICINE

## 2025-05-21 DIAGNOSIS — M25.562: Primary | ICD-10-CM

## 2025-05-21 DIAGNOSIS — M54.6: ICD-10-CM

## 2025-05-22 ENCOUNTER — HOSPITAL ENCOUNTER (OUTPATIENT)
Dept: HOSPITAL 53 - M LAB REF | Age: 50
End: 2025-05-22
Attending: PHYSICIAN ASSISTANT
Payer: COMMERCIAL

## 2025-05-22 DIAGNOSIS — N88.8: Primary | ICD-10-CM

## 2025-05-22 LAB
BUN SERPL-MCNC: 11 MG/DL (ref 9–23)
CALCIUM SERPL-MCNC: 9.3 MG/DL (ref 8.5–10.1)
CHLORIDE SERPL-SCNC: 99 MMOL/L (ref 98–107)
CO2 SERPL-SCNC: 31 MMOL/L (ref 20–31)
CREAT SERPL-MCNC: 0.68 MG/DL (ref 0.55–1.3)
GFR SERPL CREATININE-BSD FRML MDRD: > 90 ML/MIN/{1.73_M2} (ref 58–?)
GLUCOSE SERPL-MCNC: 123 MG/DL (ref 60–100)
POTASSIUM SERPL-SCNC: 3.7 MMOL/L (ref 3.5–5.1)
SODIUM SERPL-SCNC: 139 MMOL/L (ref 136–145)

## 2025-07-23 ENCOUNTER — HOSPITAL ENCOUNTER (OUTPATIENT)
Dept: HOSPITAL 53 - M WHC | Age: 50
End: 2025-07-23
Attending: PHYSICIAN ASSISTANT
Payer: COMMERCIAL

## 2025-07-23 DIAGNOSIS — Z12.31: Primary | ICD-10-CM

## 2025-07-23 DIAGNOSIS — R92.323: ICD-10-CM

## 2025-07-23 DIAGNOSIS — R92.1: ICD-10-CM
